# Patient Record
Sex: FEMALE | Race: WHITE | NOT HISPANIC OR LATINO | Employment: OTHER | URBAN - METROPOLITAN AREA
[De-identification: names, ages, dates, MRNs, and addresses within clinical notes are randomized per-mention and may not be internally consistent; named-entity substitution may affect disease eponyms.]

---

## 2017-01-03 ENCOUNTER — TRANSFERRED RECORDS (OUTPATIENT)
Dept: HEALTH INFORMATION MANAGEMENT | Facility: CLINIC | Age: 67
End: 2017-01-03

## 2017-01-18 ENCOUNTER — OFFICE VISIT (OUTPATIENT)
Dept: FAMILY MEDICINE | Facility: CLINIC | Age: 67
End: 2017-01-18
Payer: COMMERCIAL

## 2017-01-18 ENCOUNTER — TELEPHONE (OUTPATIENT)
Dept: FAMILY MEDICINE | Facility: CLINIC | Age: 67
End: 2017-01-18

## 2017-01-18 VITALS
HEIGHT: 65 IN | BODY MASS INDEX: 22.82 KG/M2 | DIASTOLIC BLOOD PRESSURE: 75 MMHG | WEIGHT: 137 LBS | RESPIRATION RATE: 20 BRPM | HEART RATE: 59 BPM | TEMPERATURE: 98.2 F | SYSTOLIC BLOOD PRESSURE: 105 MMHG

## 2017-01-18 DIAGNOSIS — J01.00 ACUTE NON-RECURRENT MAXILLARY SINUSITIS: Primary | ICD-10-CM

## 2017-01-18 DIAGNOSIS — B96.89 BACTERIAL CONJUNCTIVITIS OF BOTH EYES: ICD-10-CM

## 2017-01-18 DIAGNOSIS — H10.9 BACTERIAL CONJUNCTIVITIS OF BOTH EYES: ICD-10-CM

## 2017-01-18 PROCEDURE — 99214 OFFICE O/P EST MOD 30 MIN: CPT | Performed by: INTERNAL MEDICINE

## 2017-01-18 RX ORDER — DOXYCYCLINE 100 MG/1
100 CAPSULE ORAL 2 TIMES DAILY
Qty: 20 CAPSULE | Refills: 0 | Status: SHIPPED | OUTPATIENT
Start: 2017-01-18 | End: 2017-05-01

## 2017-01-18 RX ORDER — POLYMYXIN B SULFATE AND TRIMETHOPRIM 1; 10000 MG/ML; [USP'U]/ML
1 SOLUTION OPHTHALMIC 4 TIMES DAILY
Qty: 2 ML | Refills: 0 | Status: SHIPPED | OUTPATIENT
Start: 2017-01-18 | End: 2017-01-25

## 2017-01-18 NOTE — NURSING NOTE
"Chief Complaint   Patient presents with     Ear Problem     plugged ears for 2 weeks about     Eye Problem     swollen for about 2 weeks and mattery     Oral Swelling     lip swelling also       Initial /75 mmHg  Pulse 59  Temp(Src) 98.2  F (36.8  C) (Tympanic)  Resp 20  Ht 5' 5.25\" (1.657 m)  Wt 137 lb (62.143 kg)  BMI 22.63 kg/m2 Estimated body mass index is 22.63 kg/(m^2) as calculated from the following:    Height as of this encounter: 5' 5.25\" (1.657 m).    Weight as of this encounter: 137 lb (62.143 kg).  BP completed using cuff size: large  "

## 2017-01-18 NOTE — PATIENT INSTRUCTIONS
Thank you for choosing [facility].  You may be receiving a survey in the mail from Bianca Kohli regarding your visit today.  Please take a few minutes to complete and return the survey to let us know how we are doing.      If you have questions or concerns, please contact us via DataPad or you can contact your care team at [phone].    Our Clinic hours are:  Monday 6:40 am  to 7:00 pm  Tuesday -Friday 6:40 am to 5:00 pm    The [place] outpatient lab hours are:  Monday - Friday 6:10 am to 4:45 pm  Saturdays 7:00 am to 11:00 am  Appointments are required, call [phone]    If you have clinical questions after hours or would like to schedule an appointment,  call the clinic at [phone].      [facility]      1. Take pill antibiotic twice daily until gone  2. Take drops 4 x day for 1 week  3. Come back if you are getting sicker    Conjunctivitis, Bacterial  You have an infection in the membranes covering the white part of the eye. This part of the eye is called the conjunctiva. The infection is called conjunctivitis. The most common symptoms of conjunctivitis include a thick, pus-like discharge from the eye, swollen eyelids, redness, eyelids sticking together upon awakening, and a gritty or scratchy feeling in the eye. Your infection was caused by bacteria. It may be treated with medicine. With treatment, the infection takes about 7 to 10 days to resolve.    Home care    Use prescribed antibiotic eye drops or ointment as directed to treat the infection.    Apply a warm compress (towel soaked in warm water) to the affected eye 3 to 4 times a day. Do this just before applying medicine to the eye.    Use a warm, wet cloth to wipe away crusting of the eyelids in the morning. This is caused by mucus drainage during the night. You may also use saline irrigating solution or artificial tears to rinse away mucus in the eye. Do not put a patch over the eye.    Wash your hands before and after touching  the infected eye. This is to prevent spreading the infection to the other eye, and to other people. Do not share your towels or washcloths with others.    You may use acetaminophen or ibuprofen to control pain, unless another medicine was prescribed. (Note: If you have chronic liver or kidney disease or have ever had a stomach ulcer or gastrointestinal bleeding, talk with your doctor before using these medicines.)    Do not wear contact lenses until your eyes have healed and all symptoms are gone.  Follow-up care  Follow up with your healthcare provider, or as advised.  When to seek medical advice  Call your healthcare provider right away if any of these occur:    Worsening vision    Increasing pain in the eye    Increasing swelling or redness of the eyelid    Redness spreading around the eye    3474-6042 [employer]. [address] [place] [address], [place] [address]. All rights reserved. This information is not intended as a substitute for professional medical care. Always follow your healthcare professional's instructions.        Sinusitis (Antibiotic Treatment)    The sinuses are air-filled spaces within the bones of the face. They connect to the inside of the nose. Sinusitis is an inflammation of the tissue lining the sinus cavity. Sinus inflammation can occur during a cold. It can also be due to allergies to pollens and other particles in the air. Sinusitis can cause symptoms of sinus congestion and fullness. A sinus infection causes fever, headache and facial pain. There is often green or yellow drainage from the nose or into the back of the throat (post-nasal drip). You have been given antibiotics to treat this condition.  Home care:    Take the full course of antibiotics as instructed. Do not stop taking them, even if you feel better.    Drink plenty of water, hot tea, and other liquids. This may help thin mucus. It also may promote sinus drainage.    Heat may help soothe painful areas of the face. Use  a towel soaked in hot water. Or,  the shower and direct the hot spray onto your face. Using a vaporizer along with a menthol rub at night may also help.     An expectorant containing guaifenesin may help thin the mucus and promote drainage from the sinuses.    Over-the-counter decongestants may be used unless a similar medicine was prescribed. Nasal sprays work the fastest. Use one that contains phenylephrine or oxymetazoline. First blow the nose gently. Then use the spray. Do not use these medicines more often than directed on the label or symptoms may get worse. You may also use tablets containing pseudoephedrine. Avoid products that combine ingredients, because side effects may be increased. Read labels. You can also ask the pharmacist for help. (NOTE: Persons with high blood pressure should not use decongestants. They can raise blood pressure.)    Over-the-counter antihistamines may help if allergies contributed to your sinusitis.      Do not use nasal rinses or irrigation during an acute sinus infection, unless told to by your health care provider. Rinsing may spread the infection to other sinuses.    Use acetaminophen or ibuprofen to control pain, unless another pain medicine was prescribed. (If you have chronic liver or kidney disease or ever had a stomach ulcer, talk with your doctor before using these medicines. Aspirin should never be used in anyone under 18 years of age who is ill with a fever. It may cause severe liver damage.)    Don't smoke. This can worsen symptoms.  Follow-up care  Follow up with your healthcare provider or our staff if you are not improving within the next week.  When to seek medical advice  Call your healthcare provider if any of these occur:    Facial pain or headache becoming more severe    Stiff neck    Unusual drowsiness or confusion    Swelling of the forehead or eyelids    Vision problems, including blurred or double vision    Fever of 100.4 F (38 C) or higher, or as  directed by your healthcare provider    Seizure    Breathing problems    Symptoms not resolving within 10 days    9850-3910 [employer]. [address] [place] [address], [place] [address]. All rights reserved. This information is not intended as a substitute for professional medical care. Always follow your healthcare professional's instructions.

## 2017-01-18 NOTE — MR AVS SNAPSHOT
After Visit Summary   1/18/2017    Nora Alarcon    MRN: [id number]           Patient Information     Date Of Birth          1950        Visit Information        Provider Department      1/18/2017 3:40 PM Brandie Hill, DO [facility]        Today's Diagnoses     Acute non-recurrent maxillary sinusitis    -  1     Bacterial conjunctivitis of both eyes           Care Instructions          Thank you for choosing [facility].  You may be receiving a survey in the mail from [facility] regarding your visit today.  Please take a few minutes to complete and return the survey to let us know how we are doing.      If you have questions or concerns, please contact us via Foodlve or you can contact your care team at [phone].    Our Clinic hours are:  Monday 6:40 am  to 7:00 pm  Tuesday -Friday 6:40 am to 5:00 pm    The [place] outpatient lab hours are:  Monday - Friday 6:10 am to 4:45 pm  Saturdays 7:00 am to 11:00 am  Appointments are required, call [phone]    If you have clinical questions after hours or would like to schedule an appointment,  call the clinic at [phone].      [facility]      1. Take pill antibiotic twice daily until gone  2. Take drops 4 x day for 1 week  3. Come back if you are getting sicker    Conjunctivitis, Bacterial  You have an infection in the membranes covering the white part of the eye. This part of the eye is called the conjunctiva. The infection is called conjunctivitis. The most common symptoms of conjunctivitis include a thick, pus-like discharge from the eye, swollen eyelids, redness, eyelids sticking together upon awakening, and a gritty or scratchy feeling in the eye. Your infection was caused by bacteria. It may be treated with medicine. With treatment, the infection takes about 7 to 10 days to resolve.    Home care    Use prescribed antibiotic eye drops or ointment as directed to treat the infection.    Apply a warm  compress (towel soaked in warm water) to the affected eye 3 to 4 times a day. Do this just before applying medicine to the eye.    Use a warm, wet cloth to wipe away crusting of the eyelids in the morning. This is caused by mucus drainage during the night. You may also use saline irrigating solution or artificial tears to rinse away mucus in the eye. Do not put a patch over the eye.    Wash your hands before and after touching the infected eye. This is to prevent spreading the infection to the other eye, and to other people. Do not share your towels or washcloths with others.    You may use acetaminophen or ibuprofen to control pain, unless another medicine was prescribed. (Note: If you have chronic liver or kidney disease or have ever had a stomach ulcer or gastrointestinal bleeding, talk with your doctor before using these medicines.)    Do not wear contact lenses until your eyes have healed and all symptoms are gone.  Follow-up care  Follow up with your healthcare provider, or as advised.  When to seek medical advice  Call your healthcare provider right away if any of these occur:    Worsening vision    Increasing pain in the eye    Increasing swelling or redness of the eyelid    Redness spreading around the eye    8249-6436 [employer]. [address] [place] [address]. All rights reserved. This information is not intended as a substitute for professional medical care. Always follow your healthcare professional's instructions.        Sinusitis (Antibiotic Treatment)    The sinuses are air-filled spaces within the bones of the face. They connect to the inside of the nose. Sinusitis is an inflammation of the tissue lining the sinus cavity. Sinus inflammation can occur during a cold. It can also be due to allergies to pollens and other particles in the air. Sinusitis can cause symptoms of sinus congestion and fullness. A sinus infection causes fever, headache and facial pain. There is often  green or yellow drainage from the nose or into the back of the throat (post-nasal drip). You have been given antibiotics to treat this condition.  Home care:    Take the full course of antibiotics as instructed. Do not stop taking them, even if you feel better.    Drink plenty of water, hot tea, and other liquids. This may help thin mucus. It also may promote sinus drainage.    Heat may help soothe painful areas of the face. Use a towel soaked in hot water. Or,  the shower and direct the hot spray onto your face. Using a vaporizer along with a menthol rub at night may also help.     An expectorant containing guaifenesin may help thin the mucus and promote drainage from the sinuses.    Over-the-counter decongestants may be used unless a similar medicine was prescribed. Nasal sprays work the fastest. Use one that contains phenylephrine or oxymetazoline. First blow the nose gently. Then use the spray. Do not use these medicines more often than directed on the label or symptoms may get worse. You may also use tablets containing pseudoephedrine. Avoid products that combine ingredients, because side effects may be increased. Read labels. You can also ask the pharmacist for help. (NOTE: Persons with high blood pressure should not use decongestants. They can raise blood pressure.)    Over-the-counter antihistamines may help if allergies contributed to your sinusitis.      Do not use nasal rinses or irrigation during an acute sinus infection, unless told to by your health care provider. Rinsing may spread the infection to other sinuses.    Use acetaminophen or ibuprofen to control pain, unless another pain medicine was prescribed. (If you have chronic liver or kidney disease or ever had a stomach ulcer, talk with your doctor before using these medicines. Aspirin should never be used in anyone under 18 years of age who is ill with a fever. It may cause severe liver damage.)    Don't smoke. This can worsen  symptoms.  Follow-up care  Follow up with your healthcare provider or our staff if you are not improving within the next week.  When to seek medical advice  Call your healthcare provider if any of these occur:    Facial pain or headache becoming more severe    Stiff neck    Unusual drowsiness or confusion    Swelling of the forehead or eyelids    Vision problems, including blurred or double vision    Fever of 100.4 F (38 C) or higher, or as directed by your healthcare provider    Seizure    Breathing problems    Symptoms not resolving within 10 days    [phone] [employer]. [address] [place] [address]. All rights reserved. This information is not intended as a substitute for professional medical care. Always follow your healthcare professional's instructions.              Follow-ups after your visit        Your next 10 appointments already scheduled     Apr 24, 2017 10:30 AM   LAB with LAB FIRST FLOOR [facility] ([facility])    [address] [place] [id number] [phone]           Patient must bring picture ID.  Patient should be prepared to give a urine specimen  Please do not eat 10-12 hours before your appointment if you are coming in fasting for labs on lipids, cholesterol, or glucose (sugar).  Pregnant women should follow their Care Team instructions. Water with medications is okay. Do not drink coffee or other fluids.   If you have concerns about taking  your medications, please ask at office or if scheduling via EverPowert, send a message by clicking on Secure Messaging, Message Your Care Team.            Apr 24, 2017 11:00 AM   Return Visit with Misha Molina MD   [facility] ([facility]    [address] [place] [id number] [phone]              Who to contact     If you have questions or need follow up information about today's clinic visit or your schedule  "please contact [facility] directly at [phone].  Normal or non-critical lab and imaging results will be communicated to you by MyChart, letter or phone within 4 business days after the clinic has received the results. If you do not hear from us within 7 days, please contact the clinic through MyChart or phone. If you have a critical or abnormal lab result, we will notify you by phone as soon as possible.  Submit refill requests through Bitspark or call your pharmacy and they will forward the refill request to us. Please allow 3 business days for your refill to be completed.          Additional Information About Your Visit        [facility] Information     Bitspark lets you send messages to your doctor, view your test results, renew your prescriptions, schedule appointments and more. To sign up, go to [email or web address] . Click on \"Log in\" on the left side of the screen, which will take you to the Welcome page. Then click on \"Sign up Now\" on the right side of the page.     You will be asked to enter the access code listed below, as well as some personal information. Please follow the directions to create your username and password.     Your access code is: [email or web address]  Expires: 2017 10:32 AM     Your access code will  in 90 days. If you need help or a new code, please call your [place] clinic or [phone].        Care EveryWhere ID     This is your Care EveryWhere ID. This could be used by other organizations to access your [place] medical records  [id number]        Your Vitals Were     Pulse Temperature Respirations Height BMI (Body Mass Index)       59 98.2  F (36.8  C) (Tympanic) 20 5' 5.25\" (1.657 m) 22.63 kg/m2        Blood Pressure from Last 3 Encounters:   17 105/75   10/25/16 131/57   16 95/50    Weight from Last 3 Encounters:   17 137 lb (62.143 kg)   10/25/16 130 lb (58.968 kg)   16 122 lb 11.2 oz (55.656 kg)              Today, you had the " following     No orders found for display         Today's Medication Changes          These changes are accurate as of: 1/18/17  4:23 PM.  If you have any questions, ask your nurse or doctor.               Start taking these medicines.        Dose/Directions    doxycycline 100 MG capsule   Commonly known as:  VIBRAMYCIN   Used for:  Acute non-recurrent maxillary sinusitis   Started by:  Brandie Hill DO        Dose:  100 mg   Take 1 capsule (100 mg) by mouth 2 times daily   Quantity:  20 capsule   Refills:  0       trimethoprim-polymyxin b ophthalmic solution   Commonly known as:  POLYTRIM   Used for:  Bacterial conjunctivitis of both eyes   Started by:  Brandie Hill DO        Dose:  1 drop   Place 1 drop into both eyes 4 times daily for 7 days   Quantity:  2 mL   Refills:  0            Where to get your medicines      These medications were sent to [facility] [address] [place] [address]  [address] [place] [id number]     Phone:  [phone]    - doxycycline 100 MG capsule  - trimethoprim-polymyxin b ophthalmic solution             Primary Care Provider Office Phone # Fax #    Brandie Hill [phone]       [facility] [address] [place] [address]        Thank you!     Thank you for choosing [facility]  for your care. Our goal is always to provide you with excellent care. Hearing back from our patients is one way we can continue to improve our services. Please take a few minutes to complete the written survey that you may receive in the mail after your visit with us. Thank you!             Your Updated Medication List - Protect others around you: Learn how to safely use, store and throw away your medicines at www.disposemymeds.org.          This list is accurate as of: 1/18/17  4:23 PM.  Always use your most recent med list.                   Brand Name Dispense Instructions for use    albuterol (2.5 MG/3ML)  0.083% neb solution      Take 1 vial by nebulization 4 times daily       ASPIRIN PO      325 mg by Per G Tube route daily       chlorhexidine 0.12 % solution    PERIDEX     SWISH AND SPIT TWICE DAILY AS DIRECTED       DEPAKOTE PO      Take 500 mg by mouth 2 times daily       doxycycline 100 MG capsule    VIBRAMYCIN    20 capsule    Take 1 capsule (100 mg) by mouth 2 times daily       Metoprolol Tartrate 37.5 MG Tabs     180 tablet    Take 37.5 mg by mouth 2 times daily       omeprazole 20 MG tablet     30 tablet    Take 1 tablet (20 mg) by mouth daily as needed Take 30-60 minutes before a meal.       * order for DME     1 Package    Equipment being ordered: compression stockings.       * order for DME     2 each    Equipment being ordered: bilateral below the knee LILIBETH stockings       REMERON PO      37.5 mg by Gastric Tube route At Bedtime       SEROQUEL PO      Take 12.5 mg by mouth 3 times daily Taking twice a day       simvastatin 10 MG tablet    ZOCOR    90 tablet    Take 1 tablet (10 mg) by mouth At Bedtime       trimethoprim-polymyxin b ophthalmic solution    POLYTRIM    2 mL    Place 1 drop into both eyes 4 times daily for 7 days       TYLENOL PO      650 mg by Per G Tube route every 4 hours as needed for mild pain or fever       * Notice:  This list has 2 medication(s) that are the same as other medications prescribed for you. Read the directions carefully, and ask your doctor or other care provider to review them with you.

## 2017-01-18 NOTE — TELEPHONE ENCOUNTER
Reason for call:  Patient reporting a symptom    Symptom or request: plugged ears and swollen eyes    Duration (how long have symptoms been present): unknown    Have you been treated for this before? No    Additional comments: call from afr stating pt is calling with plugged ears and swollen eyes. She scheduled her with an appt but was wondering if it was ok for a simple spot or if she should be triaged first.    Phone Number patient can be reached at:  Home number on file [phone] (home)    Best Time:  any    Can we leave a detailed message on this number:  YES    Call taken on 1/18/2017 at 10:13 AM by Susi Mota

## 2017-01-18 NOTE — TELEPHONE ENCOUNTER
Called patient to triage for appointment today.  She has had nasal congestion for a week and now having plugged ears and itchy, watery eyes.  Patient denies cough and sinus pain/pressure.  Patient plans to come in for appointment as already scheduled.    Paz Polanco RN
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
## 2017-01-30 ENCOUNTER — OFFICE VISIT (OUTPATIENT)
Dept: FAMILY MEDICINE | Facility: CLINIC | Age: 67
End: 2017-01-30
Payer: COMMERCIAL

## 2017-01-30 VITALS
OXYGEN SATURATION: 97 % | HEART RATE: 54 BPM | SYSTOLIC BLOOD PRESSURE: 110 MMHG | HEIGHT: 65 IN | TEMPERATURE: 97.7 F | WEIGHT: 128.2 LBS | DIASTOLIC BLOOD PRESSURE: 62 MMHG | BODY MASS INDEX: 21.36 KG/M2

## 2017-01-30 DIAGNOSIS — K59.09 OTHER CONSTIPATION: ICD-10-CM

## 2017-01-30 DIAGNOSIS — R30.0 DYSURIA: Primary | ICD-10-CM

## 2017-01-30 LAB
ALBUMIN UR-MCNC: NEGATIVE MG/DL
APPEARANCE UR: ABNORMAL
BACTERIA #/AREA URNS HPF: ABNORMAL /HPF
BILIRUB UR QL STRIP: NEGATIVE
COLOR UR AUTO: YELLOW
GLUCOSE UR STRIP-MCNC: NEGATIVE MG/DL
HGB UR QL STRIP: ABNORMAL
KETONES UR STRIP-MCNC: NEGATIVE MG/DL
LEUKOCYTE ESTERASE UR QL STRIP: NEGATIVE
NITRATE UR QL: NEGATIVE
NON-SQ EPI CELLS #/AREA URNS LPF: ABNORMAL /LPF
PH UR STRIP: 5.5 PH (ref 5–7)
RBC #/AREA URNS AUTO: ABNORMAL /HPF (ref 0–2)
SP GR UR STRIP: >1.03 (ref 1–1.03)
URATE CRY #/AREA URNS HPF: ABNORMAL /HPF
URN SPEC COLLECT METH UR: ABNORMAL
UROBILINOGEN UR STRIP-ACNC: 0.2 EU/DL (ref 0.2–1)
WBC #/AREA URNS AUTO: ABNORMAL /HPF (ref 0–2)

## 2017-01-30 PROCEDURE — 99213 OFFICE O/P EST LOW 20 MIN: CPT | Performed by: NURSE PRACTITIONER

## 2017-01-30 PROCEDURE — 81001 URINALYSIS AUTO W/SCOPE: CPT | Performed by: NURSE PRACTITIONER

## 2017-01-30 NOTE — NURSING NOTE
"Chief Complaint   Patient presents with     Dysuria       Initial There were no vitals taken for this visit. Estimated body mass index is 22.63 kg/(m^2) as calculated from the following:    Height as of 1/18/17: 5' 5.25\" (1.657 m).    Weight as of 1/18/17: 137 lb (62.143 kg).  BP completed using cuff size: regular  "

## 2017-01-30 NOTE — PROGRESS NOTES
"  SUBJECTIVE:                                                    Nora Alarcon is a 66 year old female who presents to clinic today for the following health issues:      URINARY TRACT SYMPTOMS     Onset: Saturday-2 - 3 days ago    Description:   Painful urination (Dysuria): YES  Blood in urine (Hematuria): YES  Delay in urine (Hesitency): no     Intensity: mild    Progression of Symptoms:  same    Accompanying Signs & Symptoms:  Fever/chills: no   Flank pain no   Nausea and vomiting: no   Any vaginal symptoms: none   Abdominal/Pelvic Pain: no    History:   History of frequent UTI's: NO  History of kidney stones: no   Sexually Active: no   Possibility of pregnancy: No    Precipitating factors:   none       Therapies Tried and outcome: none    Pt also states that she is constipated- was able to have a normal BM before her appointment today.       -------------------------------------    Problem list and histories reviewed & adjusted, as indicated.  Additional history: as documented    Problem list, Medication list, Allergies, and Medical/Social/Surgical histories reviewed in [facility] and updated as appropriate.    ROS:  Constitutional, HEENT, cardiovascular, pulmonary, GI, , musculoskeletal, neuro, skin, endocrine and psych systems are negative, except as otherwise noted.    OBJECTIVE:                                                    /62 mmHg  Pulse 54  Temp(Src) 97.7  F (36.5  C) (Tympanic)  Ht 5' 5.25\" (1.657 m)  Wt 128 lb 3.2 oz (58.151 kg)  BMI 21.18 kg/m2  SpO2 97%  Body mass index is 21.18 kg/(m^2).  GENERAL: alert and no distress- thin appearing   RESP: lungs clear to auscultation - no rales, rhonchi or wheezes  CV: regular rate and rhythm, normal S1 S2, no S3 or S4, no murmur, click or rub,   ABDOMEN: soft, nontender, no hepatosplenomegaly, no masses and bowel sounds normal  MS: no gross musculoskeletal defects noted, no edema    Diagnostic Test Results:  none      ASSESSMENT/PLAN:                "                                         1. Dysuria  UA did not show any indication of infection- patient left before results were back as she did not feel like waiting.  Recommend to increase fluid intake - try OTC cranberry tablets- if symptoms persist would repeat UA  - *UA reflex to Microscopic and Culture ([facility] and [facility] (except [place] and [place])  - Urine Microscopic    2. Other constipation  Recommend to patient drinking 60 oz water day  Increase fruit and fiber in diet  Ok to take Senakot prn  Take metamucil daily if           Brandie JESUS Escobar CNP  [facility]

## 2017-04-14 DIAGNOSIS — N18.30 CKD (CHRONIC KIDNEY DISEASE) STAGE 3, GFR 30-59 ML/MIN (H): Primary | ICD-10-CM

## 2017-05-01 ENCOUNTER — OFFICE VISIT (OUTPATIENT)
Dept: NEPHROLOGY | Facility: CLINIC | Age: 67
End: 2017-05-01
Payer: COMMERCIAL

## 2017-05-01 VITALS
DIASTOLIC BLOOD PRESSURE: 59 MMHG | SYSTOLIC BLOOD PRESSURE: 122 MMHG | HEART RATE: 60 BPM | WEIGHT: 133 LBS | BODY MASS INDEX: 21.96 KG/M2

## 2017-05-01 DIAGNOSIS — N18.30 CKD (CHRONIC KIDNEY DISEASE) STAGE 3, GFR 30-59 ML/MIN (H): ICD-10-CM

## 2017-05-01 DIAGNOSIS — I10 ESSENTIAL HYPERTENSION WITH GOAL BLOOD PRESSURE LESS THAN 140/90: ICD-10-CM

## 2017-05-01 DIAGNOSIS — R79.89 ELEVATED SERUM CREATININE: Primary | ICD-10-CM

## 2017-05-01 LAB
ALBUMIN SERPL-MCNC: 3.3 G/DL (ref 3.4–5)
ALBUMIN UR-MCNC: NEGATIVE MG/DL
ANION GAP SERPL CALCULATED.3IONS-SCNC: 6 MMOL/L (ref 3–14)
APPEARANCE UR: CLEAR
BACTERIA #/AREA URNS HPF: ABNORMAL /HPF
BILIRUB UR QL STRIP: NEGATIVE
BUN SERPL-MCNC: 21 MG/DL (ref 7–30)
CALCIUM SERPL-MCNC: 9.2 MG/DL (ref 8.5–10.1)
CHLORIDE SERPL-SCNC: 105 MMOL/L (ref 94–109)
CO2 SERPL-SCNC: 30 MMOL/L (ref 20–32)
COLOR UR AUTO: YELLOW
CREAT SERPL-MCNC: 0.84 MG/DL (ref 0.52–1.04)
CREAT UR-MCNC: 136 MG/DL
CREAT UR-MCNC: 136 MG/DL
GFR SERPL CREATININE-BSD FRML MDRD: 67 ML/MIN/1.7M2
GLUCOSE SERPL-MCNC: 78 MG/DL (ref 70–99)
GLUCOSE UR STRIP-MCNC: NEGATIVE MG/DL
GRAN CASTS #/AREA URNS LPF: ABNORMAL /LPF
HGB BLD-MCNC: 14.2 G/DL (ref 11.7–15.7)
HGB UR QL STRIP: ABNORMAL
HYALINE CASTS #/AREA URNS LPF: ABNORMAL /LPF (ref 0–2)
KETONES UR STRIP-MCNC: NEGATIVE MG/DL
LEUKOCYTE ESTERASE UR QL STRIP: NEGATIVE
MICROALBUMIN UR-MCNC: 17 MG/L
MICROALBUMIN/CREAT UR: 12.21 MG/G CR (ref 0–25)
NITRATE UR QL: NEGATIVE
NON-SQ EPI CELLS #/AREA URNS LPF: ABNORMAL /LPF
PH UR STRIP: 6 PH (ref 5–7)
PHOSPHATE SERPL-MCNC: 4.2 MG/DL (ref 2.5–4.5)
POTASSIUM SERPL-SCNC: 4.6 MMOL/L (ref 3.4–5.3)
PROT UR-MCNC: 0.11 G/L
PROT/CREAT 24H UR: 0.08 G/G CR (ref 0–0.2)
PTH-INTACT SERPL-MCNC: 39 PG/ML (ref 12–72)
RBC #/AREA URNS AUTO: ABNORMAL /HPF (ref 0–2)
SODIUM SERPL-SCNC: 141 MMOL/L (ref 133–144)
SP GR UR STRIP: 1.02 (ref 1–1.03)
URN SPEC COLLECT METH UR: ABNORMAL
UROBILINOGEN UR STRIP-MCNC: NORMAL MG/DL (ref 0–2)
WBC #/AREA URNS AUTO: ABNORMAL /HPF (ref 0–2)

## 2017-05-01 PROCEDURE — 83970 ASSAY OF PARATHORMONE: CPT | Performed by: INTERNAL MEDICINE

## 2017-05-01 PROCEDURE — 81001 URINALYSIS AUTO W/SCOPE: CPT | Performed by: INTERNAL MEDICINE

## 2017-05-01 PROCEDURE — 36415 COLL VENOUS BLD VENIPUNCTURE: CPT | Performed by: INTERNAL MEDICINE

## 2017-05-01 PROCEDURE — 85018 HEMOGLOBIN: CPT | Performed by: INTERNAL MEDICINE

## 2017-05-01 PROCEDURE — 84156 ASSAY OF PROTEIN URINE: CPT | Performed by: INTERNAL MEDICINE

## 2017-05-01 PROCEDURE — 99214 OFFICE O/P EST MOD 30 MIN: CPT | Performed by: INTERNAL MEDICINE

## 2017-05-01 PROCEDURE — 80069 RENAL FUNCTION PANEL: CPT | Performed by: INTERNAL MEDICINE

## 2017-05-01 PROCEDURE — 82043 UR ALBUMIN QUANTITATIVE: CPT | Performed by: INTERNAL MEDICINE

## 2017-05-01 NOTE — NURSING NOTE
"Nora Alarcon's goals for this visit include:   Chief Complaint   Patient presents with     RECHECK       She requests these members of her care team be copied on today's visit information: pcp    PCP: Brandie Hill    Referring Provider:  No referring provider defined for this encounter.    Chief Complaint   Patient presents with     RECHECK       Initial Wt 60.3 kg (133 lb)  BMI 21.96 kg/m2 Estimated body mass index is 21.96 kg/(m^2) as calculated from the following:    Height as of 1/30/17: 1.657 m (5' 5.25\").    Weight as of this encounter: 60.3 kg (133 lb).  Medication Reconciliation: complete    Do you need any medication refills at today's visit? No    Nadia Rayn CMA ([facility]AMA)      "

## 2017-05-01 NOTE — LETTER
May 2, 2017      Nora Alarcon  [address] [address] [address]              Dear Nora,      Your most recent lab results are attached.  There is no albumin in the urine which is reassuring.    Please call if you have any further questions.          Sincerely,      Misha Molina MD  KW/gw    Results for orders placed or performed in visit on 05/01/17   UA with Microscopic reflex to Culture   Result Value Ref Range    Color Urine Yellow     Appearance Urine Clear     Glucose Urine Negative NEG mg/dL    Bilirubin Urine Negative NEG    Ketones Urine Negative NEG mg/dL    Specific Gravity Urine 1.020 1.003 - 1.035    Blood Urine Trace (A) NEG    pH Urine 6.0 5.0 - 7.0 pH    Protein Albumin Urine Negative NEG mg/dL    Urobilinogen mg/dL Normal 0.0 - 2.0 mg/dL    Nitrite Urine Negative NEG    Leukocyte Esterase Urine Negative NEG    Source Midstream Urine     WBC Urine O - 2 0 - 2 /HPF    RBC Urine O - 2 0 - 2 /HPF    Bacteria Urine Few  Unconcentrated   (A) NEG /HPF    Squamous Epithelial /LPF Urine Few FEW /LPF    Hyaline Casts O - 2 0 - 2 /LPF    Granular Casts 0-2 (A) NEG /LPF   Albumin Random Urine Quantitative   Result Value Ref Range    Creatinine Urine 136 mg/dL    Albumin Urine mg/L 17 mg/L    Albumin Urine mg/g Cr 12.21 0 - 25 mg/g Cr

## 2017-05-01 NOTE — MR AVS SNAPSHOT
After Visit Summary   2017    Nora Alarcon    MRN: [id number]           Patient Information     Date Of Birth          1950        Visit Information        Provider Department      2017 11:00 AM Misha Molina MD [facility]        Today's Diagnoses     Elevated serum creatinine    -  1       Follow-ups after your visit        Who to contact     If you have questions or need follow up information about today's clinic visit or your schedule please contact [facility] directly at [phone].  Normal or non-critical lab and imaging results will be communicated to you by [employer]ure[employer], letter or phone within 4 business days after the clinic has received the results. If you do not hear from us within 7 days, please contact the clinic through [employer]ecure[employer] or phone. If you have a critical or abnormal lab result, we will notify you by phone as soon as possible.  Submit refill requests through [employer] or call your pharmacy and they will forward the refill request to us. Please allow 3 business days for your refill to be completed.          Additional Information About Your Visit        MyChart Information     [employer] is an electronic gateway that provides easy, online access to your medical records. With [employer], you can request a clinic appointment, read your test results, renew a prescription or communicate with your care team.     To sign up for [employer] visit the website at [email or web address]   You will be asked to enter the access code listed below, as well as some personal information. Please follow the directions to create your username and password.     Your access code is: [email or web address]  Expires: 2017 10:05 AM     Your access code will  in 90 days. If you need help or a new code, please contact your [facility] or call [phone] for assistance.        Care EveryWhere ID     This is your Care EveryWhere  ID. This could be used by other organizations to access your [facility] medical records  [id number]        Your Vitals Were     Pulse BMI (Body Mass Index)                60 21.96 kg/m2           Blood Pressure from Last 3 Encounters:   05/01/17 122/59   01/30/17 110/62   01/18/17 105/75    Weight from Last 3 Encounters:   05/01/17 60.3 kg (133 lb)   01/30/17 58.2 kg (128 lb 3.2 oz)   01/18/17 62.1 kg (137 lb)              We Performed the Following     Albumin Random Urine Quantitative     UA with Microscopic reflex to Culture          Today's Medication Changes          These changes are accurate as of: 5/1/17 11:30 AM.  If you have any questions, ask your nurse or doctor.               Stop taking these medicines if you haven't already. Please contact your care team if you have questions.     albuterol (2.5 MG/3ML) 0.083% neb solution   Stopped by:  Misha Molina MD           doxycycline 100 MG capsule   Commonly known as:  VIBRAMYCIN   Stopped by:  Misha Molina MD                    Primary Care Provider Office Phone # Fax #    Brandie Hill,  [phone]       [place] [id number]        Thank you!     Thank you for choosing [facility]  for your care. Our goal is always to provide you with excellent care. Hearing back from our patients is one way we can continue to improve our services. Please take a few minutes to complete the written survey that you may receive in the mail after your visit with us. Thank you!             Your Updated Medication List - Protect others around you: Learn how to safely use, store and throw away your medicines at www.disposemymeds.org.          This list is accurate as of: 5/1/17 11:30 AM.  Always use your most recent med list.                   Brand Name Dispense Instructions for use    ASPIRIN PO      325 mg by Per G Tube route daily       chlorhexidine 0.12 % solution    PERIDEX      SWISH AND SPIT TWICE DAILY AS DIRECTED       DEPAKOTE PO      Take 500 mg by mouth 2 times daily       Metoprolol Tartrate 37.5 MG Tabs     180 tablet    Take 37.5 mg by mouth 2 times daily       omeprazole 20 MG tablet     30 tablet    Take 1 tablet (20 mg) by mouth daily as needed Take 30-60 minutes before a meal.       * order for DME     1 Package    Equipment being ordered: compression stockings.       * order for DME     2 each    Equipment being ordered: bilateral below the knee LILIBETH stockings       REMERON PO      37.5 mg by Gastric Tube route At Bedtime       SEROQUEL PO      Take 12.5 mg by mouth 3 times daily Taking twice a day       simvastatin 10 MG tablet    ZOCOR    90 tablet    Take 1 tablet (10 mg) by mouth At Bedtime       TYLENOL PO      650 mg by Per G Tube route every 4 hours as needed for mild pain or fever       * Notice:  This list has 2 medication(s) that are the same as other medications prescribed for you. Read the directions carefully, and ask your doctor or other care provider to review them with you.

## 2017-05-31 DIAGNOSIS — E78.5 HYPERLIPIDEMIA LDL GOAL <130: ICD-10-CM

## 2017-05-31 NOTE — TELEPHONE ENCOUNTER
simvastatin (ZOCOR) 10 MG tablet     Last Written Prescription Date: 7/13/16  Last Fill Quantity: 90, # refills: 3  Last Office Visit with G, P or [facility] prescribing provider: 1/30/17       Lab Results   Component Value Date    CHOL 143 06/13/2015     Lab Results   Component Value/ Date    HDL 63 06/13/2015     Lab Results   Component Value Date    LDL 64 06/13/2015     Lab Results   Component Value Date    TRIG 78 06/13/2015     Lab Results   Component Value Date    CHOLHDLRATIO 2.3 06/13/2015
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
## 2017-06-02 ENCOUNTER — TELEPHONE (OUTPATIENT)
Dept: FAMILY MEDICINE | Facility: CLINIC | Age: 67
End: 2017-06-02

## 2017-06-02 DIAGNOSIS — H90.8 MIXED HEARING LOSS: Primary | ICD-10-CM

## 2017-06-02 NOTE — TELEPHONE ENCOUNTER
Patient having problems with hearing   Patient requesting referral to  Audiology  And ENT     Appointment  06/06/17  Please call and advise  Jeimy LOMBARDI

## 2017-06-02 NOTE — TELEPHONE ENCOUNTER
LOV 1/18/17  Patient reports:  Unable to hear the tv, sitting in living room cannot hear tea kettle or baking timer go off  Patient would like referral for Audiology, appt already scheduled 6/6/17 by patient  Please advise  Referral pended    Routing to provider.    Sofia BARKLEY Rn

## 2017-06-02 NOTE — TELEPHONE ENCOUNTER
Message left for the pt to return a call to the clinic. CSS can let pt know that referral has been placed.    Maribel Harper RN

## 2017-06-05 RX ORDER — SIMVASTATIN 10 MG
10 TABLET ORAL AT BEDTIME
Qty: 30 TABLET | Refills: 0 | Status: SHIPPED | OUTPATIENT
Start: 2017-06-05 | End: 2017-06-27

## 2017-06-06 ENCOUNTER — OFFICE VISIT (OUTPATIENT)
Dept: AUDIOLOGY | Facility: CLINIC | Age: 67
End: 2017-06-06
Payer: COMMERCIAL

## 2017-06-06 ENCOUNTER — HOSPITAL ENCOUNTER (OUTPATIENT)
Dept: MAMMOGRAPHY | Facility: CLINIC | Age: 67
Discharge: HOME | End: 2017-06-06
Attending: INTERNAL MEDICINE | Admitting: INTERNAL MEDICINE
Payer: MEDICARE

## 2017-06-06 DIAGNOSIS — Z12.31 ENCOUNTER FOR SCREENING MAMMOGRAM FOR BREAST CANCER: ICD-10-CM

## 2017-06-06 DIAGNOSIS — H90.3 SENSORINEURAL HEARING LOSS, BILATERAL: Primary | ICD-10-CM

## 2017-06-06 PROCEDURE — G0202 SCR MAMMO BI INCL CAD: HCPCS

## 2017-06-06 PROCEDURE — 92557 COMPREHENSIVE HEARING TEST: CPT | Performed by: AUDIOLOGIST

## 2017-06-06 PROCEDURE — 92567 TYMPANOMETRY: CPT | Performed by: AUDIOLOGIST

## 2017-06-06 NOTE — MR AVS SNAPSHOT
After Visit Summary   6/6/2017    Nora Alarcon    MRN: [id number]           Patient Information     Date Of Birth          1950        Visit Information        Provider Department      6/6/2017 10:30 AM Kahte Sneed AuD [facility]        Today's Diagnoses     Sensorineural hearing loss, bilateral    -  1       Follow-ups after your visit        Your next 10 appointments already scheduled     Jun 06, 2017 11:45 AM CDT   MA SCREENING DIGITAL BILATERAL with [id number]   [facility] ([facility])    [address] [address]   [phone]           Do not use any powder, lotion or deodorant under your arms or on your breast. If you do, we will ask you to remove it before your exam.  Wear comfortable, two-piece clothing.  If you have any allergies, tell your care team.  Bring any previous mammograms from other facilities or have them mailed to the breast center.              Future tests that were ordered for you today     Open Future Orders        Priority Expected Expires Ordered    MA Screen Bilateral w/Blaine Routine  9/20/2017 9/20/2016            Who to contact     If you have questions or need follow up information about today's clinic visit or your schedule please contact [facility] directly at [phone].  Normal or non-critical lab and imaging results will be communicated to you by MyChart, letter or phone within 4 business days after the clinic has received the results. If you do not hear from us within 7 days, please contact the clinic through Anne Fogartyhart or phone. If you have a critical or abnormal lab result, we will notify you by phone as soon as possible.  Submit refill requests through Looxii or call your pharmacy and they will forward the refill request to us. Please allow 3 business days for your refill to be completed.          Additional Information About Your Visit        Anne FogartyharBuzzMob Information     Looxii lets  "you send messages to your doctor, view your test results, renew your prescriptions, schedule appointments and more. To sign up, go to [email or web address] . Click on \"Log in\" on the left side of the screen, which will take you to the Welcome page. Then click on \"Sign up Now\" on the right side of the page.     You will be asked to enter the access code listed below, as well as some personal information. Please follow the directions to create your username and password.     Your access code is: [email or web address]  Expires: 2017 10:05 AM     Your access code will  in 90 days. If you need help or a new code, please call your [place] clinic or [phone].        Care EveryWhere ID     This is your Care EveryWhere ID. This could be used by other organizations to access your [facility] medical records  [id number]         Blood Pressure from Last 3 Encounters:   17 122/59   17 110/62   17 105/75    Weight from Last 3 Encounters:   17 133 lb (60.3 kg)   17 128 lb 3.2 oz (58.2 kg)   17 137 lb (62.1 kg)              We Performed the Following     AUDIOGRAM/TYMPANOGRAM - INTERFACE     COMPREHENSIVE HEARING TEST     TYMPANOMETRY        Primary Care Provider Office Phone # Fax #    Brandie Hill  [phone]       [facility] [address] [address]        Thank you!     Thank you for choosing [facility]  for your care. Our goal is always to provide you with excellent care. Hearing back from our patients is one way we can continue to improve our services. Please take a few minutes to complete the written survey that you may receive in the mail after your visit with us. Thank you!             Your Updated Medication List - Protect others around you: Learn how to safely use, store and throw away your medicines at www.disposemymeds.org.          This list is accurate as of: 17 11:15 AM.  Always use your most recent med " list.                   Brand Name Dispense Instructions for use    ASPIRIN PO      325 mg by Per G Tube route daily       chlorhexidine 0.12 % solution    PERIDEX     SWISH AND SPIT TWICE DAILY AS DIRECTED       DEPAKOTE PO      Take 500 mg by mouth 2 times daily       Metoprolol Tartrate 37.5 MG Tabs     180 tablet    Take 37.5 mg by mouth 2 times daily       omeprazole 20 MG tablet     30 tablet    Take 1 tablet (20 mg) by mouth daily as needed Take 30-60 minutes before a meal.       * order for DME     1 Package    Equipment being ordered: compression stockings.       * order for DME     2 each    Equipment being ordered: bilateral below the knee LILIBETH stockings       REMERON PO      37.5 mg by Gastric Tube route At Bedtime       SEROQUEL PO      Take 12.5 mg by mouth 3 times daily Taking twice a day       simvastatin 10 MG tablet    ZOCOR    30 tablet    Take 1 tablet (10 mg) by mouth At Bedtime (Needs fasting lab work)       TYLENOL PO      650 mg by Per G Tube route every 4 hours as needed for mild pain or fever       * Notice:  This list has 2 medication(s) that are the same as other medications prescribed for you. Read the directions carefully, and ask your doctor or other care provider to review them with you.
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
## 2017-06-27 ENCOUNTER — OFFICE VISIT (OUTPATIENT)
Dept: FAMILY MEDICINE | Facility: CLINIC | Age: 67
End: 2017-06-27
Payer: COMMERCIAL

## 2017-06-27 VITALS
DIASTOLIC BLOOD PRESSURE: 70 MMHG | OXYGEN SATURATION: 99 % | SYSTOLIC BLOOD PRESSURE: 121 MMHG | HEIGHT: 65 IN | HEART RATE: 59 BPM | TEMPERATURE: 98.3 F | BODY MASS INDEX: 22.99 KG/M2 | WEIGHT: 138 LBS

## 2017-06-27 DIAGNOSIS — E78.5 HYPERLIPIDEMIA LDL GOAL <130: Primary | ICD-10-CM

## 2017-06-27 DIAGNOSIS — F31.0 BIPOLAR AFFECTIVE DISORDER, CURRENT EPISODE HYPOMANIC (H): ICD-10-CM

## 2017-06-27 DIAGNOSIS — I10 ESSENTIAL HYPERTENSION WITH GOAL BLOOD PRESSURE LESS THAN 140/90: ICD-10-CM

## 2017-06-27 DIAGNOSIS — N95.2 POSTMENOPAUSAL ATROPHIC VAGINITIS: ICD-10-CM

## 2017-06-27 PROCEDURE — 99214 OFFICE O/P EST MOD 30 MIN: CPT | Performed by: INTERNAL MEDICINE

## 2017-06-27 RX ORDER — ESTRADIOL 0.1 MG/G
1 CREAM VAGINAL
Qty: 42.5 G | Refills: 11 | Status: SHIPPED | OUTPATIENT
Start: 2017-06-29 | End: 2017-08-25

## 2017-06-27 RX ORDER — SIMVASTATIN 10 MG
10 TABLET ORAL AT BEDTIME
Qty: 90 TABLET | Refills: 3 | Status: SHIPPED | OUTPATIENT
Start: 2017-06-27 | End: 2018-06-25

## 2017-06-27 NOTE — NURSING NOTE
"Initial /70  Pulse 59  Temp 98.3  F (36.8  C) (Tympanic)  Ht 5' 5.25\" (1.657 m)  Wt 138 lb (62.6 kg)  SpO2 99%  Breastfeeding? No  BMI 22.79 kg/m2 Estimated body mass index is 22.79 kg/(m^2) as calculated from the following:    Height as of this encounter: 5' 5.25\" (1.657 m).    Weight as of this encounter: 138 lb (62.6 kg). .    Esperanza Richardson CMA ([facility])  "

## 2017-06-27 NOTE — PROGRESS NOTES
"  SUBJECTIVE:                                                    Nora Alarcon is a 66 year old female who presents to clinic today for the following health issues:    Chief Complaint   Patient presents with     Recheck Medication     her nephrologist told her to go off omeprazole, she wants to discuss this with you today.      Medication Problem     she does not even know why she is taking mirtazapine, she would like to know what this medication is for.      Mouth Problem     she states her mouth is dry all the time and she is using biotin spray but not sure that is working.      She saw Nephrology 5/17 for CKD.  In the note, there were no recommendations to stop PPI.  She reports she doesn't need to follow with Nephrology any longer.  She denies problems with GERD.  Father has history of stomach cancer.  She has not taken the PPI in a months, although it is on her med list.    Hypertension:  She reports she is on losartan, but I don't see this on her med list.  We had stopped this previously, but she reports pharmacy keeps trying to fill, we discussed staying off this med.  Is checking blood pressure at home, all < 120.  Still taking metoprolol.    Psych:  She takes seroquel under direction of Dr. Tipton. Feels mood is stable. She is also on mirtazapine, which may have been started for appetite.  She is currently taking \"3 pills at bedtime' but doesn't know the dose.    Painful intercourse:  She reports she was on a medication in the past (cream) that helped.  It was expensive in the past.      Current Outpatient Prescriptions   Medication Sig Dispense Refill     simvastatin (ZOCOR) 10 MG tablet Take 1 tablet (10 mg) by mouth At Bedtime (Needs fasting lab work) 30 tablet 0     chlorhexidine (PERIDEX) 0.12 % solution SWISH AND SPIT TWICE DAILY AS DIRECTED  99     Divalproex Sodium (DEPAKOTE PO) Take 500 mg by mouth 2 times daily       Metoprolol Tartrate 37.5 MG TABS Take 37.5 mg by mouth 2 times daily 180 " "tablet 3     QUEtiapine Fumarate (SEROQUEL PO) Take 12.5 mg by mouth 3 times daily Taking twice a day       Acetaminophen (TYLENOL PO) 650 mg by Per G Tube route every 4 hours as needed for mild pain or fever       ASPIRIN  mg by Per G Tube route daily        Mirtazapine (REMERON PO) 37.5 mg by Gastric Tube route At Bedtime       omeprazole 20 MG tablet Take 1 tablet (20 mg) by mouth daily as needed Take 30-60 minutes before a meal. 30 tablet 10     order for DME Equipment being ordered: bilateral below the knee LILIBETH stockings 2 each 2     order for DME Equipment being ordered: compression stockings. 1 Package 1       Reviewed and updated as needed this visit by clinical staff  Tobacco  Allergies  Meds  Problems  Med Hx  Surg Hx  Fam Hx  Soc Hx        Reviewed and updated as needed this visit by Provider  Allergies  Meds  Problems         ROS:  Constitutional, HEENT, cardiovascular, pulmonary, gi and gu systems are negative, except as otherwise noted.    OBJECTIVE:     /70  Pulse 59  Temp 98.3  F (36.8  C) (Tympanic)  Ht 5' 5.25\" (1.657 m)  Wt 138 lb (62.6 kg)  SpO2 99%  Breastfeeding? No  BMI 22.79 kg/m2  Body mass index is 22.79 kg/(m^2).  GENERAL APPEARANCE: healthy, alert and no distress  PSYCH: mentation appears normal, anxious and worried      ASSESSMENT/PLAN:         ICD-10-CM    1. Hyperlipidemia LDL goal <130 E78.5 **Lipid panel reflex to direct LDL FUTURE anytime     simvastatin (ZOCOR) 10 MG tablet   2. Essential hypertension with goal blood pressure less than 140/90 I10    3. Postmenopausal atrophic vaginitis N95.2 estradiol (ESTRACE VAGINAL) 0.1 MG/GM cream   4. Bipolar affective disorder, current episode hypomanic (H) F31.0        1. Ok to slowly stop the mirtazapine.  Take 2 pills at bedtime for 4 days, then 1 pill at bedtime for 4 days, then stop.  This can help appetite but also mood.  If you find your mood worsening, let Dr. Tipton know.  2. Ok to stop and stay off " the omeprazole.  3. Ok to stop the Metoprolol.  Monitor the blood pressure.  If you note the blood pressure greater than 140, let Dr. Hill know.  4. Come back for fasting blood work.  5. Ok to restart Estradiol cream for vaginal symptoms       Brandie Hill, DO  [facility]

## 2017-06-27 NOTE — PATIENT INSTRUCTIONS
1. Ok to slowly stop the mirtazapine.  Take 2 pills at bedtime for 4 days, then 1 pill at bedtime for 4 days, then stop.  This can help appetite but also mood.  If you find your mood worsening, let Dr. Tipton know.  2. Ok to stop and stay off the omeprazole.  3. Ok to stop the Metoprolol.  Monitor the blood pressure.  If you note the blood pressure greater than 140, let Dr. Hill know.  4. Come back for fasting blood work.  5. Ok to restart Estradiol cream for vaginal symptoms

## 2017-06-27 NOTE — MR AVS SNAPSHOT
After Visit Summary   6/27/2017    Nora Alarcon    MRN: [id number]           Patient Information     Date Of Birth          1950        Visit Information        Provider Department      6/27/2017 6:40 AM Brandie Hill, DO [facility]        Today's Diagnoses     Hyperlipidemia LDL goal <130    -  1    Essential hypertension with goal blood pressure less than 140/90        Postmenopausal atrophic vaginitis          Care Instructions    1. Ok to slowly stop the mirtazapine.  Take 2 pills at bedtime for 4 days, then 1 pill at bedtime for 4 days, then stop.  This can help appetite but also mood.  If you find your mood worsening, let Dr. Tipton know.  2. Ok to stop and stay off the omeprazole.  3. Ok to stop the Metoprolol.  Monitor the blood pressure.  If you note the blood pressure greater than 140, let Dr. Hill know.  4. Come back for fasting blood work.  5. Ok to restart Estradiol cream for vaginal symptoms           Follow-ups after your visit        Future tests that were ordered for you today     Open Future Orders        Priority Expected Expires Ordered    **Lipid panel reflex to direct LDL FUTURE anytime Routine 6/27/2017 6/27/2018 6/27/2017            Who to contact     If you have questions or need follow up information about today's clinic visit or your schedule please contact [facility] directly at [phone].  Normal or non-critical lab and imaging results will be communicated to you by MyChart, letter or phone within 4 business days after the clinic has received the results. If you do not hear from us within 7 days, please contact the clinic through MyChart or phone. If you have a critical or abnormal lab result, we will notify you by phone as soon as possible.  Submit refill requests through Zulu or call your pharmacy and they will forward the refill request to us. Please allow 3 business days for your refill to be completed.           "Additional Information About Your Visit        MyChart Information     Herren[employer]e lets you send messages to your doctor, view your test results, renew your prescriptions, schedule appointments and more. To sign up, go to [email or web address] . Click on \"Log in\" on the left side of the screen, which will take you to the Welcome page. Then click on \"Sign up Now\" on the right side of the page.     You will be asked to enter the access code listed below, as well as some personal information. Please follow the directions to create your username and password.     Your access code is: [email or web address]  Expires: 2017 10:05 AM     Your access code will  in 90 days. If you need help or a new code, please call your [place] clinic or [phone].        Care EveryWhere ID     This is your Care EveryWhere ID. This could be used by other organizations to access your [place] medical records  [id number]        Your Vitals Were     Pulse Temperature Height Pulse Oximetry Breastfeeding? BMI (Body Mass Index)    59 98.3  F (36.8  C) (Tympanic) 5' 5.25\" (1.657 m) 99% No 22.79 kg/m2       Blood Pressure from Last 3 Encounters:   17 121/70   17 122/59   17 110/62    Weight from Last 3 Encounters:   17 138 lb (62.6 kg)   17 133 lb (60.3 kg)   17 128 lb 3.2 oz (58.2 kg)                 Today's Medication Changes          These changes are accurate as of: 17  7:13 AM.  If you have any questions, ask your nurse or doctor.               Start taking these medicines.        Dose/Directions    estradiol 0.1 MG/GM cream   Commonly known as:  ESTRACE VAGINAL   Used for:  Postmenopausal atrophic vaginitis   Started by:  Brandie Hill DO        Dose:  1 g   Start taking on:  2017   Place 1 g vaginally twice a week   Quantity:  42.5 g   Refills:  11         These medicines have changed or have updated prescriptions.        Dose/Directions    simvastatin 10 MG tablet   Commonly known " as:  ZOCOR   This may have changed:  additional instructions   Used for:  Hyperlipidemia LDL goal <130   Changed by:  Brandie Hill DO        Dose:  10 mg   Take 1 tablet (10 mg) by mouth At Bedtime   Quantity:  90 tablet   Refills:  3            Where to get your medicines      These medications were sent to [id number] - [place], [place] - [address]  [address], [facility] [address]     Phone:  [phone]     estradiol 0.1 MG/GM cream    simvastatin 10 MG tablet                Primary Care Provider Office Phone # Fax #    Brandie Leonela Hill [phone]       [facility] [address] [address]        Equal Access to Services     BONNIE BUTLER : Hadii paxton myrick hadasho Soomaali, waaxda luqadaha, qaybta kaalmada adeegyada, waxay jerryin jose chaudhary. So [facility] [phone].    ATENCIÓN: Si habla español, tiene a galaviz disposición servicios gratuitos de asistencia lingüística. [facility] [phone].    We comply with applicable federal civil rights laws and [place] laws. We do not discriminate on the basis of race, color, national origin, age, disability sex, sexual orientation or gender identity.            Thank you!     Thank you for choosing [facility]  for your care. Our goal is always to provide you with excellent care. Hearing back from our patients is one way we can continue to improve our services. Please take a few minutes to complete the written survey that you may receive in the mail after your visit with us. Thank you!             Your Updated Medication List - Protect others around you: Learn how to safely use, store and throw away your medicines at www.disposemymeds.org.          This list is accurate as of: 6/27/17  7:13 AM.  Always use your most recent med list.                   Brand Name Dispense Instructions for use Diagnosis    ASPIRIN PO      Take 325 mg by mouth daily         chlorhexidine 0.12 % solution    PERIDEX     SWISH AND SPIT TWICE DAILY AS DIRECTED        DEPAKOTE PO      Take 500 mg by mouth 2 times daily        estradiol 0.1 MG/GM cream   Start taking on:  6/29/2017    ESTRACE VAGINAL    42.5 g    Place 1 g vaginally twice a week    Postmenopausal atrophic vaginitis       * order for DME     1 Package    Equipment being ordered: compression stockings.    Edema       * order for DME     2 each    Equipment being ordered: bilateral below the knee LILIBETH stockings    Bilateral leg edema       SEROQUEL PO      Take 12.5 mg by mouth 3 times daily Taking twice a day        simvastatin 10 MG tablet    ZOCOR    90 tablet    Take 1 tablet (10 mg) by mouth At Bedtime    Hyperlipidemia LDL goal <130       TYLENOL PO      Take 650 mg by mouth every 4 hours as needed for mild pain or fever        * Notice:  This list has 2 medication(s) that are the same as other medications prescribed for you. Read the directions carefully, and ask your doctor or other care provider to review them with you.

## 2017-06-30 NOTE — TELEPHONE ENCOUNTER
Metoprolol discontinued 06/27/17    Losartan      Last Written Prescription Date: 07/22/16   Ended:09/23/16  Last Fill Quantity: 90, # refills: 3  Last Office Visit with G, [facility] or [facility] prescribing provider: 06/27/17       Potassium   Date Value Ref Range Status   05/01/2017 4.6 3.4 - 5.3 mmol/L Final     Creatinine   Date Value Ref Range Status   05/01/2017 0.84 0.52 - 1.04 mg/dL Final     BP Readings from Last 3 Encounters:   06/27/17 121/70   05/01/17 122/59   01/30/17 110/62

## 2017-07-10 RX ORDER — METOPROLOL TARTRATE 25 MG/1
TABLET, FILM COATED ORAL
Qty: 270 TABLET | OUTPATIENT
Start: 2017-07-10

## 2017-07-10 RX ORDER — LOSARTAN POTASSIUM 50 MG/1
TABLET ORAL
Qty: 90 TABLET | OUTPATIENT
Start: 2017-07-10

## 2017-08-25 ENCOUNTER — TELEPHONE (OUTPATIENT)
Dept: FAMILY MEDICINE | Facility: CLINIC | Age: 67
End: 2017-08-25

## 2017-08-25 ENCOUNTER — HOSPITAL ENCOUNTER (EMERGENCY)
Facility: CLINIC | Age: 67
Discharge: NURSING, REHAB OR LONG-TERM CARE | End: 2017-08-25
Attending: EMERGENCY MEDICINE | Admitting: EMERGENCY MEDICINE
Payer: MEDICARE

## 2017-08-25 VITALS
TEMPERATURE: 98.6 F | BODY MASS INDEX: 22.5 KG/M2 | WEIGHT: 140 LBS | OXYGEN SATURATION: 100 % | DIASTOLIC BLOOD PRESSURE: 58 MMHG | RESPIRATION RATE: 14 BRPM | HEIGHT: 66 IN | SYSTOLIC BLOOD PRESSURE: 138 MMHG | HEART RATE: 68 BPM

## 2017-08-25 DIAGNOSIS — R42 LIGHTHEADED: ICD-10-CM

## 2017-08-25 LAB
ALBUMIN UR-MCNC: NEGATIVE MG/DL
ANION GAP SERPL CALCULATED.3IONS-SCNC: 6 MMOL/L (ref 3–14)
APPEARANCE UR: CLEAR
BASOPHILS # BLD AUTO: 0 10E9/L (ref 0–0.2)
BASOPHILS NFR BLD AUTO: 0.5 %
BILIRUB UR QL STRIP: NEGATIVE
BUN SERPL-MCNC: 18 MG/DL (ref 7–30)
CALCIUM SERPL-MCNC: 8.3 MG/DL (ref 8.5–10.1)
CHLORIDE SERPL-SCNC: 107 MMOL/L (ref 94–109)
CO2 SERPL-SCNC: 27 MMOL/L (ref 20–32)
COLOR UR AUTO: YELLOW
CREAT SERPL-MCNC: 0.76 MG/DL (ref 0.52–1.04)
DIFFERENTIAL METHOD BLD: ABNORMAL
EOSINOPHIL # BLD AUTO: 0.1 10E9/L (ref 0–0.7)
EOSINOPHIL NFR BLD AUTO: 2.7 %
ERYTHROCYTE [DISTWIDTH] IN BLOOD BY AUTOMATED COUNT: 13.2 % (ref 10–15)
GFR SERPL CREATININE-BSD FRML MDRD: 76 ML/MIN/1.7M2
GLUCOSE SERPL-MCNC: 76 MG/DL (ref 70–99)
GLUCOSE UR STRIP-MCNC: NEGATIVE MG/DL
HCT VFR BLD AUTO: 37.8 % (ref 35–47)
HGB BLD-MCNC: 12.9 G/DL (ref 11.7–15.7)
HGB UR QL STRIP: NEGATIVE
IMM GRANULOCYTES # BLD: 0 10E9/L (ref 0–0.4)
IMM GRANULOCYTES NFR BLD: 0.3 %
KETONES UR STRIP-MCNC: NEGATIVE MG/DL
LEUKOCYTE ESTERASE UR QL STRIP: NEGATIVE
LYMPHOCYTES # BLD AUTO: 0.7 10E9/L (ref 0.8–5.3)
LYMPHOCYTES NFR BLD AUTO: 19.4 %
MCH RBC QN AUTO: 33.9 PG (ref 26.5–33)
MCHC RBC AUTO-ENTMCNC: 34.1 G/DL (ref 31.5–36.5)
MCV RBC AUTO: 99 FL (ref 78–100)
MONOCYTES # BLD AUTO: 0.7 10E9/L (ref 0–1.3)
MONOCYTES NFR BLD AUTO: 18 %
NEUTROPHILS # BLD AUTO: 2.2 10E9/L (ref 1.6–8.3)
NEUTROPHILS NFR BLD AUTO: 59.1 %
NITRATE UR QL: NEGATIVE
PH UR STRIP: 8 PH (ref 5–7)
PLATELET # BLD AUTO: 157 10E9/L (ref 150–450)
POTASSIUM SERPL-SCNC: 4.1 MMOL/L (ref 3.4–5.3)
RBC # BLD AUTO: 3.81 10E12/L (ref 3.8–5.2)
SODIUM SERPL-SCNC: 140 MMOL/L (ref 133–144)
SOURCE: ABNORMAL
SP GR UR STRIP: 1.01 (ref 1–1.03)
TROPONIN I SERPL-MCNC: 0.03 UG/L (ref 0–0.04)
UROBILINOGEN UR STRIP-MCNC: 2 MG/DL (ref 0–2)
WBC # BLD AUTO: 3.7 10E9/L (ref 4–11)

## 2017-08-25 PROCEDURE — 84484 ASSAY OF TROPONIN QUANT: CPT | Performed by: EMERGENCY MEDICINE

## 2017-08-25 PROCEDURE — 80048 BASIC METABOLIC PNL TOTAL CA: CPT | Performed by: EMERGENCY MEDICINE

## 2017-08-25 PROCEDURE — 81003 URINALYSIS AUTO W/O SCOPE: CPT | Performed by: EMERGENCY MEDICINE

## 2017-08-25 PROCEDURE — 99284 EMERGENCY DEPT VISIT MOD MDM: CPT | Mod: 25 | Performed by: EMERGENCY MEDICINE

## 2017-08-25 PROCEDURE — 99284 EMERGENCY DEPT VISIT MOD MDM: CPT | Performed by: EMERGENCY MEDICINE

## 2017-08-25 PROCEDURE — 93010 ELECTROCARDIOGRAM REPORT: CPT | Mod: Z6 | Performed by: EMERGENCY MEDICINE

## 2017-08-25 PROCEDURE — 85025 COMPLETE CBC W/AUTO DIFF WBC: CPT | Performed by: EMERGENCY MEDICINE

## 2017-08-25 PROCEDURE — 93005 ELECTROCARDIOGRAM TRACING: CPT | Performed by: EMERGENCY MEDICINE

## 2017-08-25 NOTE — ED AVS SNAPSHOT
[facility] Emergency Department    [address] [address]    Phone:  [phone]    Fax:  [phone]                                       Nora Alarcon   MRN: [id number]    Department:  [facility] Emergency Department   Date of Visit:  8/25/2017           Patient Information     Date Of Birth          1950        Your diagnoses for this visit were:     Lightheaded        You were seen by Danny Alarcon MD.      Follow-up Information     Follow up with Brandie Hill DO.    Specialty:  Internal Medicine    Contact information:    [address] [address]  [phone]          Discharge Instructions       Continue current medications, return if her headache, focal neurologic change, shortness of air, palpitations, chest pain, passing out or any other concern.    24 Hour Appointment Hotline       To make an appointment at any [place] clinic, call [phone] ([phone]). If you don't have a family doctor or clinic, we will help you find one. [facility] [place] clinics are conveniently located to serve the needs of you and your family.             Review of your medicines      Our records show that you are taking the medicines listed below. If these are incorrect, please call your family doctor or clinic.        Dose / Directions Last dose taken    ASPIRIN PO   Dose:  325 mg        Take 325 mg by mouth daily   Refills:  0        chlorhexidine 0.12 % solution   Commonly known as:  PERIDEX        SWISH AND SPIT TWICE DAILY AS DIRECTED   Refills:  99        DEPAKOTE PO   Dose:  500 mg        Take 500 mg by mouth 2 times daily   Refills:  0        MIRTAZAPINE PO   Dose:  45 mg        Take 45 mg by mouth At Bedtime   Refills:  0        order for DME   Quantity:  2 each        Equipment being ordered: bilateral below the knee LILIBETH stockings   Refills:  2        simvastatin 10 MG tablet   Commonly known as:  ZOCOR   Dose:  10 mg   Quantity:  90 tablet        Take 1  tablet (10 mg) by mouth At Bedtime   Refills:  3        TYLENOL PO   Dose:  1000 mg        Take 1,000 mg by mouth 2 times daily as needed for mild pain or fever   Refills:  0                Procedures and tests performed during your visit     Basic metabolic panel    CBC with platelets, differential    EKG 12 lead    Troponin I    UA reflex to Microscopic      Orders Needing Specimen Collection     None      Pending Results     No orders found from 8/23/2017 to 8/26/2017.            Pending Culture Results     No orders found from 8/23/2017 to 8/26/2017.            Pending Results Instructions     If you had any lab results that were not finalized at the time of your Discharge, you can call the ED Lab Result RN at [phone]. You will be contacted by this team for any positive Lab results or changes in treatment. The nurses are available 7 days a week from 10A to 6:30P.  You can leave a message 24 hours per day and they will return your call.        Test Results From Your Hospital Stay        8/25/2017  3:39 PM      Component Results     Component Value Ref Range & Units Status    Color Urine Yellow  Final    Appearance Urine Clear  Final    Glucose Urine Negative NEG^Negative mg/dL Final    Bilirubin Urine Negative NEG^Negative Final    Ketones Urine Negative NEG^Negative mg/dL Final    Specific Gravity Urine 1.012 1.003 - 1.035 Final    Blood Urine Negative NEG^Negative Final    pH Urine 8.0 (H) 5.0 - 7.0 pH Final    Protein Albumin Urine Negative NEG^Negative mg/dL Final    Urobilinogen mg/dL 2.0 0.0 - 2.0 mg/dL Final    Nitrite Urine Negative NEG^Negative Final    Leukocyte Esterase Urine Negative NEG^Negative Final    Source Midstream Urine  Final         8/25/2017  3:51 PM      Component Results     Component Value Ref Range & Units Status    WBC 3.7 (L) 4.0 - 11.0 10e9/L Final    RBC Count 3.81 3.8 - 5.2 10e12/L Final    Hemoglobin 12.9 11.7 - 15.7 g/dL Final    Hematocrit 37.8 35.0 - 47.0 % Final    MCV 99  78 - 100 fl Final    MCH 33.9 (H) 26.5 - 33.0 pg Final    MCHC 34.1 31.5 - 36.5 g/dL Final    RDW 13.2 10.0 - 15.0 % Final    Platelet Count 157 150 - 450 10e9/L Final    Diff Method Automated Method  Final    % Neutrophils 59.1 % Final    % Lymphocytes 19.4 % Final    % Monocytes 18.0 % Final    % Eosinophils 2.7 % Final    % Basophils 0.5 % Final    % Immature Granulocytes 0.3 % Final    Absolute Neutrophil 2.2 1.6 - 8.3 10e9/L Final    Absolute Lymphocytes 0.7 (L) 0.8 - 5.3 10e9/L Final    Absolute Monocytes 0.7 0.0 - 1.3 10e9/L Final    Absolute Eosinophils 0.1 0.0 - 0.7 10e9/L Final    Absolute Basophils 0.0 0.0 - 0.2 10e9/L Final    Abs Immature Granulocytes 0.0 0 - 0.4 10e9/L Final         8/25/2017  4:09 PM      Component Results     Component Value Ref Range & Units Status    Sodium 140 133 - 144 mmol/L Final    Potassium 4.1 3.4 - 5.3 mmol/L Final    Chloride 107 94 - 109 mmol/L Final    Carbon Dioxide 27 20 - 32 mmol/L Final    Anion Gap 6 3 - 14 mmol/L Final    Glucose 76 70 - 99 mg/dL Final    Urea Nitrogen 18 7 - 30 mg/dL Final    Creatinine 0.76 0.52 - 1.04 mg/dL Final    GFR Estimate 76 >60 mL/min/1.7m2 Final    Non  GFR Calc    GFR Estimate If Black >90 >60 mL/min/1.7m2 Final    African American GFR Calc    Calcium 8.3 (L) 8.5 - 10.1 mg/dL Final         8/25/2017  4:09 PM      Component Results     Component Value Ref Range & Units Status    Troponin I ES 0.030 0.000 - 0.045 ug/L Final    The 99th percentile for upper reference range is 0.045 ug/L.  Troponin values   in the range of 0.045 - 0.120 ug/L may be associated with risks of adverse   clinical events.                  Thank you for choosing [facility]       Thank you for choosing [facility] for your care. Our goal is always to provide you with excellent care. Hearing back from our patients is one way we can continue to improve our services. Please take a few minutes to complete the written survey that you may receive in the mail  "after you visit with us. Thank you!        [employer] Information     [employer] lets you send messages to your doctor, view your test results, renew your prescriptions, schedule appointments and more. To sign up, go to [email or web address] . Click on \"Log in\" on the left side of the screen, which will take you to the Welcome page. Then click on \"Sign up Now\" on the right side of the page.     You will be asked to enter the access code listed below, as well as some personal information. Please follow the directions to create your username and password.     Your access code is: [email or web address]  Expires: 2017  4:16 PM     Your access code will  in 90 days. If you need help or a new code, please call your [facility] clinic or [phone].        Care EveryWhere ID     This is your Care EveryWhere ID. This could be used by other organizations to access your [facility] medical records  [id number]        Equal Access to Services     [facility] : Hadii paxton myrick hadasho Somayuriali, waaxda luqadaha, qaybta kaalmada adeyolandayavirgie, hang [place] . So [facility] [phone].    ATENCIÓN: Si habla español, tiene a galaviz disposición servicios gratuitos de asistencia lingüística. Llame al [phone].    We comply with applicable federal civil rights laws and [place] laws. We do not discriminate on the basis of race, color, national origin, age, disability sex, sexual orientation or gender identity.            After Visit Summary       This is your record. Keep this with you and show to your community pharmacist(s) and doctor(s) at your next visit.                  "

## 2017-08-25 NOTE — DISCHARGE INSTRUCTIONS
Continue current medications, return if her headache, focal neurologic change, shortness of air, palpitations, chest pain, passing out or any other concern.

## 2017-08-25 NOTE — TELEPHONE ENCOUNTER
Dr Hill took Nora off her bp meds and is high she feels dizzy, upset and crying.. bp 151/83     Pavithra Boyce CSS

## 2017-08-25 NOTE — ED AVS SNAPSHOT
[facility] Emergency Department    [address] [address]    Phone:  [phone]    Fax:  [phone]                                       Nora Alarcon   MRN: [id number]    Department:  [facility] Emergency Department   Date of Visit:  8/25/2017           After Visit Summary Signature Page     I have received my discharge instructions, and my questions have been answered. I have discussed any challenges I see with this plan with the nurse or doctor.    ..........................................................................................................................................  Patient/Patient Representative Signature      ..........................................................................................................................................  Patient Representative Print Name and Relationship to Patient    ..................................................               ................................................  Date                                            Time    ..........................................................................................................................................  Reviewed by Signature/Title    ...................................................              ..............................................  Date                                                            Time

## 2017-08-25 NOTE — TELEPHONE ENCOUNTER
Patient calling regarding her bp of 153/81, HR = 70 she took a few minutes before her call to clinic.  She states Dr. Hill wanted to know if her bp was over 150/90.  Patient also states she feels lightheaded and dizzy as well as having numbness/tingling in bilateral hands/fingers.  Patient is upset and crying on the phone.  She admits to a history of anxiety.  Denies chest pain, SOB, vision changes, HA, fever or any other symptoms at this time.  RN advised based on protocol - further assessment in ED.  Patient states she will go to ED.  Helen BARKLEY RN
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
## 2017-11-03 ENCOUNTER — TELEPHONE (OUTPATIENT)
Dept: AUDIOLOGY | Facility: CLINIC | Age: 67
End: 2017-11-03

## 2017-11-03 NOTE — TELEPHONE ENCOUNTER
Reason for Call:  Other call back    Detailed comments: pt calling stating she would like to talk about the different hearing aid options. Her ins will cover the basic model     Phone Number Patient can be reached at: Home number on file [phone] (home)    Best Time: any     Can we leave a detailed message on this number? YES    Call taken on 11/3/2017 at 3:28 PM by Sushma Purcell

## 2017-11-06 NOTE — TELEPHONE ENCOUNTER
Answered questions on the hearing aid consultation process. Patient will schedule as desires.    Kathe Sneed M.A. F-AAA, #[id number]

## 2017-11-07 ENCOUNTER — OFFICE VISIT (OUTPATIENT)
Dept: AUDIOLOGY | Facility: CLINIC | Age: 67
End: 2017-11-07
Payer: COMMERCIAL

## 2017-11-07 DIAGNOSIS — H90.3 SENSORINEURAL HEARING LOSS, BILATERAL: Primary | ICD-10-CM

## 2017-11-07 DIAGNOSIS — E78.5 HYPERLIPIDEMIA LDL GOAL <130: ICD-10-CM

## 2017-11-07 LAB
CHOLEST SERPL-MCNC: 191 MG/DL
HDLC SERPL-MCNC: 92 MG/DL
LDLC SERPL CALC-MCNC: 78 MG/DL
NONHDLC SERPL-MCNC: 99 MG/DL
TRIGL SERPL-MCNC: 106 MG/DL

## 2017-11-07 PROCEDURE — 80061 LIPID PANEL: CPT | Performed by: INTERNAL MEDICINE

## 2017-11-07 PROCEDURE — 36415 COLL VENOUS BLD VENIPUNCTURE: CPT | Performed by: INTERNAL MEDICINE

## 2017-11-07 PROCEDURE — 99207 ZZC NO CHARGE LOS: CPT | Performed by: AUDIOLOGIST

## 2017-11-07 PROCEDURE — 92591 HC HEARING AID EXAM BINAURAL: CPT | Performed by: AUDIOLOGIST

## 2017-11-07 NOTE — PROGRESS NOTES
AUDIOLOGY REPORT    SUBJECTIVE: Nora Alarcon is a 66 year old female was seen in the Audiology Clinic at  [facility] on 11/07/17 to discuss concerns with hearing and functional communication difficulties. The patient was accompanied by their self. Nora has been seen previously on 6/6/2017, and results revealed a mild to moderate sensorineural hearing loss bilaterally. Nora notes difficulty with communication in a variety of listening situations.    OBJECTIVE:  Patient is a hearing aid candidate. Patient would like to move forward with a hearing aid evaluation today. Therefore, the patient was presented with different options for amplification to help aid in communication. Discussed styles, levels of technology and monaural vs. binaural fitting.     The hearing aid(s) mutually chosen were:  Binaural: Phonak Audeo B50-R  COLOR: P4  Fady  BATTERY SIZE: Rechargable  CANAL/ LENGTH: 1    Otoscopy revealed ears are clear of cerumen bilaterally.    ASSESSMENT:   No diagnosis found.    Reviewed purchase information and warranty information with patient. The 45 day trial period was explained to patient. The patient was given a copy of the [place] Department of Health consumer brochure on purchasing hearing instruments. Patient risk factors have been provided to the patient in writing prior to the sale of the hearing aid per FDA regulation. The risk factors are also available in the User Instructional Booklet to be presented on the day of the hearing aid fitting. Hearing aid(s) ordered. Hearing aid evaluation completed.    PLAN: Nora is scheduled to return in 2-3 weeks for a hearing aid fitting and programming. Purchase agreement will be completed on that date. Please contact this clinic with any questions or concerns.      Kathe WRIGHT-BEAU, #[id number]

## 2017-11-07 NOTE — MR AVS SNAPSHOT
"              After Visit Summary   2017    Nora Alarcon    MRN: [id number]           Patient Information     Date Of Birth          1950        Visit Information        Provider Department      2017 11:00 AM Kathe Sneed [facility]        Today's Diagnoses     Sensorineural hearing loss, bilateral    -  1       Follow-ups after your visit        Who to contact     If you have questions or need follow up information about today's clinic visit or your schedule please contact [facility] directly at [phone].  Normal or non-critical lab and imaging results will be communicated to you by Nuevolutionhart, letter or phone within 4 business days after the clinic has received the results. If you do not hear from us within 7 days, please contact the clinic through Nuevolutionhart or phone. If you have a critical or abnormal lab result, we will notify you by phone as soon as possible.  Submit refill requests through Clerk or call your pharmacy and they will forward the refill request to us. Please allow 3 business days for your refill to be completed.          Additional Information About Your Visit        MyChart Information     Clerk lets you send messages to your doctor, view your test results, renew your prescriptions, schedule appointments and more. To sign up, go to [email or web address] . Click on \"Log in\" on the left side of the screen, which will take you to the Welcome page. Then click on \"Sign up Now\" on the right side of the page.     You will be asked to enter the access code listed below, as well as some personal information. Please follow the directions to create your username and password.     Your access code is: [email or web address]  Expires: 2017  3:16 PM     Your access code will  in 90 days. If you need help or a new code, please call your [facility] or [phone].        Care EveryWhere ID     This is your Care EveryWhere ID. This could be used " by other organizations to access your [place] medical records  [id number]         Blood Pressure from Last 3 Encounters:   08/25/17 138/58   06/27/17 121/70   05/01/17 122/59    Weight from Last 3 Encounters:   08/25/17 140 lb (63.5 kg)   06/27/17 138 lb (62.6 kg)   05/01/17 133 lb (60.3 kg)              We Performed the Following     HEARING AID EXAM BINAURAL        Primary Care Provider Office Phone # Fax #    Brandie Hill  [phone] [address] [address]        Equal Access to Services     BONNIE BUTLER : Hadii aad ku hadasho Somayuriali, waaxda luqadaha, qaybta kaalmada adeegyada, hang chaudhary. So [facility] [phone].    ATENCIÓN: Si habla español, tiene a galaviz disposición servicios gratuitos de asistencia lingüística. Llame al [phone].    We comply with applicable federal civil rights laws and [place] laws. We do not discriminate on the basis of race, color, national origin, age, disability, sex, sexual orientation, or gender identity.            Thank you!     Thank you for choosing [facility]  for your care. Our goal is always to provide you with excellent care. Hearing back from our patients is one way we can continue to improve our services. Please take a few minutes to complete the written survey that you may receive in the mail after your visit with us. Thank you!             Your Updated Medication List - Protect others around you: Learn how to safely use, store and throw away your medicines at www.disposemymeds.org.          This list is accurate as of: 11/7/17  2:06 PM.  Always use your most recent med list.                   Brand Name Dispense Instructions for use Diagnosis    ASPIRIN PO      Take 325 mg by mouth daily        chlorhexidine 0.12 % solution    PERIDEX     SWISH AND SPIT TWICE DAILY AS DIRECTED        DEPAKOTE PO      Take 500 mg by mouth 2 times daily        MIRTAZAPINE PO      Take 45 mg by  mouth At Bedtime        order for DME     2 each    Equipment being ordered: bilateral below the knee LILIBETH stockings    Bilateral leg edema       simvastatin 10 MG tablet    ZOCOR    90 tablet    Take 1 tablet (10 mg) by mouth At Bedtime    Hyperlipidemia LDL goal <130       TYLENOL PO      Take 1,000 mg by mouth 2 times daily as needed for mild pain or fever

## 2017-11-10 ENCOUNTER — TELEPHONE (OUTPATIENT)
Dept: AUDIOLOGY | Facility: CLINIC | Age: 67
End: 2017-11-10

## 2017-11-10 NOTE — TELEPHONE ENCOUNTER
Pt called and has some more questions regarding hearing aids. Please advise.    Georgina Rosas  [facility]

## 2017-11-16 ENCOUNTER — TELEPHONE (OUTPATIENT)
Dept: AUDIOLOGY | Facility: CLINIC | Age: 67
End: 2017-11-16

## 2017-11-16 NOTE — TELEPHONE ENCOUNTER
Reason for Call:  Other call back    Detailed comments: pt calling stating she has some question before coming in before getting fitted for hearing aids.     Phone Number Patient can be reached at: Home number on file [phone] (home)    Best Time: any     Can we leave a detailed message on this number? YES    Call taken on 11/16/2017 at 1:42 PM by Sushma Purcell

## 2017-11-17 NOTE — TELEPHONE ENCOUNTER
Again questions on her insurance and the hearing aids ordered. Reassured patient and reviewed all that was decided.    Kathe Sneed M.A. -[facility], #[id number]

## 2017-12-04 ENCOUNTER — TELEPHONE (OUTPATIENT)
Dept: AUDIOLOGY | Facility: CLINIC | Age: 67
End: 2017-12-04

## 2017-12-04 NOTE — TELEPHONE ENCOUNTER
Reason for Call:  Other     Detailed comments: Before picking up hearing aids, needs to know exact cost. Said she can bring insurance card today if needed. Has some questions regarding the hearing aids.  Appt to  hearing aids is scheduled for 12/11. - Please advise    Phone Number Patient can be reached at: Home number on file [phone] (home) or Cell #: [phone]    Best Time: Any    Can we leave a detailed message on this number? YES (on cell phone)    Call taken on 12/4/2017 at 9:33 AM by Denise Behrendt

## 2017-12-05 NOTE — TELEPHONE ENCOUNTER
Again discussed hearing aid insurance coverage. Recommend patient contact her insurance and inquire about coverage for the hearing aid recommended. Patient states she will do this.    Kathe MURRAY, #[id number]

## 2017-12-06 ENCOUNTER — NURSE TRIAGE (OUTPATIENT)
Dept: NURSING | Facility: CLINIC | Age: 67
End: 2017-12-06

## 2017-12-07 NOTE — TELEPHONE ENCOUNTER
William calling from  stating pt called them to get info on cost of hearing aids.   Please call pt back to let her know if this will be billed out as standard or upgrade.  Explain what the difference is between the standard and upgrade.   This will determine if the pt has to pay for the hearing aid or not.   standard is covered  Upgrade pt would have to pay    Please call pt     Sushma Purcell  [facility]

## 2017-12-11 ENCOUNTER — OFFICE VISIT (OUTPATIENT)
Dept: AUDIOLOGY | Facility: CLINIC | Age: 67
End: 2017-12-11
Payer: COMMERCIAL

## 2017-12-11 DIAGNOSIS — H90.3 SENSORINEURAL HEARING LOSS, BILATERAL: Primary | ICD-10-CM

## 2017-12-11 PROCEDURE — 99207 ZZC NO CHARGE LOS: CPT | Performed by: AUDIOLOGIST

## 2017-12-11 PROCEDURE — 92593 HC HEARING AID CHECK, BINAURAL: CPT | Performed by: AUDIOLOGIST

## 2017-12-11 PROCEDURE — V5020 CONFORMITY EVALUATION: HCPCS | Mod: RT | Performed by: AUDIOLOGIST

## 2017-12-11 PROCEDURE — V5011 HEARING AID FITTING/CHECKING: HCPCS | Mod: RT | Performed by: AUDIOLOGIST

## 2017-12-11 PROCEDURE — V5020 CONFORMITY EVALUATION: HCPCS | Mod: LT | Performed by: AUDIOLOGIST

## 2017-12-11 PROCEDURE — V5261 HEARING AID, DIGIT, BIN, BTE: HCPCS | Performed by: AUDIOLOGIST

## 2017-12-11 PROCEDURE — V5267 HEARING AID SUP/ACCESS/DEV: HCPCS | Performed by: AUDIOLOGIST

## 2017-12-11 PROCEDURE — V5160 DISPENSING FEE BINAURAL: HCPCS | Performed by: AUDIOLOGIST

## 2017-12-11 PROCEDURE — V5011 HEARING AID FITTING/CHECKING: HCPCS | Mod: LT | Performed by: AUDIOLOGIST

## 2017-12-11 NOTE — MR AVS SNAPSHOT
"              After Visit Summary   12/11/2017    Nora Alarcon    MRN: [id number]           Patient Information     Date Of Birth          1950        Visit Information        Provider Department      12/11/2017 10:30 AM Kathe Sneed, [facility]        Today's Diagnoses     Sensorineural hearing loss, bilateral    -  1       Follow-ups after your visit        Your next 10 appointments already scheduled     Jan 08, 2018 11:30 AM CST   [facility]    [address] [facility] [address] [phone]            Abraham 15, 2018  1:15 PM CST   [facility]    [address] [address] [phone]              Who to contact     If you have questions or need follow up information about today's clinic visit or your schedule please contact [facility] directly at [phone].  Normal or non-critical lab and imaging results will be communicated to you by OR Productivityhart, letter or phone within 4 business days after the clinic has received the results. If you do not hear from us within 7 days, please contact the clinic through OR Productivityhart or phone. If you have a critical or abnormal lab result, we will notify you by phone as soon as possible.  Submit refill requests through [employer] or call your pharmacy and they will forward the refill request to us. Please allow 3 business days for your refill to be completed.          Additional Information About Your Visit        OR Productivityhart Information     End[employer] The[employer] lets you send messages to your doctor, view your test results, renew your prescriptions, schedule appointments and more. To sign up, go to [email or web address] . Click on \"Log in\" on the left side of the screen, which will take you to the Welcome page. Then click on \"Sign up Now\" on the right side of the " page.     You will be asked to enter the access code listed below, as well as some personal information. Please follow the directions to create your username and password.     Your access code is: [email or web address]  Expires: 3/11/2018 12:41 PM     Your access code will  in 90 days. If you need help or a new code, please call your [place] clinic or [phone].        Care EveryWhere ID     This is your Care EveryWhere ID. This could be used by other organizations to access your [place] medical records  [id number]         Blood Pressure from Last 3 Encounters:   17 138/58   17 121/70   17 122/59    Weight from Last 3 Encounters:   17 140 lb (63.5 kg)   17 138 lb (62.6 kg)   17 133 lb (60.3 kg)              We Performed the Following     DISPENSING FEE, BINAURAL HEARING AID     HEARING AID BTE DIGITAL, BINAURAL     HEARING AID CHECK, BINAURAL     HEARING AID CONFORMITY EVALUATION     HEARING AID FIT/ORIENTATION/CHECK     HEARING AID SUPPLY        Primary Care Provider Office Phone # Fax #    Brandie Hill, [phone] [address] [address]        Equal Access to Services     BONNIE BUTLER AH: Hadii aad ku hadasho Soomaali, waaxda luqadaha, qaybta kaalmada adeegyada, waxay idiin hayalinan nicole chaudhary. So [facility] [phone].    ATENCIÓN: Si habla español, tiene a galaviz disposición servicios gratuitos de asistencia lingüística. Llame al [phone].    We comply with applicable federal civil rights laws and [place] laws. We do not discriminate on the basis of race, color, national origin, age, disability, sex, sexual orientation, or gender identity.            Thank you!     Thank you for choosing [facility]  for your care. Our goal is always to provide you with excellent care. Hearing back from our patients is one way we can continue to improve our services. Please take a few minutes to complete the written survey that  you may receive in the mail after your visit with us. Thank you!             Your Updated Medication List - Protect others around you: Learn how to safely use, store and throw away your medicines at www.disposemymeds.org.          This list is accurate as of: 12/11/17 12:41 PM.  Always use your most recent med list.                   Brand Name Dispense Instructions for use Diagnosis    ASPIRIN PO      Take 325 mg by mouth daily        chlorhexidine 0.12 % solution    PERIDEX     SWISH AND SPIT TWICE DAILY AS DIRECTED        DEPAKOTE PO      Take 500 mg by mouth 2 times daily        MIRTAZAPINE PO      Take 45 mg by mouth At Bedtime        order for DME     2 each    Equipment being ordered: bilateral below the knee LILIBETH stockings    Bilateral leg edema       simvastatin 10 MG tablet    ZOCOR    90 tablet    Take 1 tablet (10 mg) by mouth At Bedtime    Hyperlipidemia LDL goal <130       TYLENOL PO      Take 1,000 mg by mouth 2 times daily as needed for mild pain or fever

## 2017-12-11 NOTE — PROGRESS NOTES
AUDIOLOGY REPORT    SUBJECTIVE: Nora Alarcon, a 66 year old female, was seen in the Audiology Clinic at [facility] today for a Binaural hearing aid fitting. Previous results have revealed a bilateral mild to moderate sensorineural hearing loss. The patient is a first time hearing aid user.    OBJECTIVE:  Prior to fitting, a hearing aid check was performed to ensure device functionality. The hearing aid conformity evaluation was completed.The hearing aids were placed and they provided a good fit. Real-ear-probe-microphone measurements were completed on the [place] system and were a good match to NAL-NL2 target with soft sounds audible, moderate sounds comfortable, and loud sounds below discomfort. UCLs are verified through maximum power output measures and demonstrate appropriate limiting of loud inputs. Ms. Alarcon was oriented to proper hearing aid use, care, cleaning (no water, dry brush), batteries (rechargable, low-battery signal), aid insertion/removal, user booklet, warranty information, storage cases, and other hearing aid details. The patient confirmed understanding of hearing aid use and care, and showed proper insertion of hearing aid and batteries while in the office today. Ms. Alarcon reported good volume and sound quality today.    EAR(S) FIT: Binaural  MA HEARING AID MODEL NAME:  [employer] B50-R  HEARING AID STYLE: NICK  EARMOLDS/TIP/ LINK: #1 xS  SERIAL NUMBERS: Right: [id number]; Left: [id number]  WARRANTY END DATE: 2/5/2020    CHARGES:   Hearing Aid Check: Binaural, 53492, $81.00  Dispensing Fee: Binaural, , $500.00  Fit/Orientation: Binaural, , $322.00  Hearing Aid Conformity Evaluation: , Qty:2  Hearing Aid Digital: Binaural, BTE,     ASSESSMENT: Binaural hearing aid fitting completed today. Verification measures were performed. The 45 day trial period was explained to patient, and they expressed understanding. Ms. Alarcon signed the  Hearing Aid Purchase Agreement and was given a copy, as well as details on her hearing aids.    PLAN: Ms. Alarcon will return for follow-up in 2-3 weeks for a hearing aid review appointment. Please call this clinic with questions regarding today s appointment.    Kathe Sneed M.A. -[facility], #[id number]

## 2018-01-08 ENCOUNTER — OFFICE VISIT (OUTPATIENT)
Dept: AUDIOLOGY | Facility: CLINIC | Age: 68
End: 2018-01-08
Payer: COMMERCIAL

## 2018-01-08 DIAGNOSIS — H90.3 SENSORINEURAL HEARING LOSS, BILATERAL: Primary | ICD-10-CM

## 2018-01-08 PROCEDURE — V5299 HEARING SERVICE: HCPCS | Performed by: AUDIOLOGIST

## 2018-01-08 PROCEDURE — 99207 ZZC NO CHARGE LOS: CPT | Performed by: AUDIOLOGIST

## 2018-01-08 NOTE — MR AVS SNAPSHOT
"              After Visit Summary   1/8/2018    Nora Alarcon    MRN: [id number]           Patient Information     Date Of Birth          1950        Visit Information        Provider Department      1/8/2018 11:30 AM Kathe Sneed, Jum[facility]        Today's Diagnoses     Sensorineural hearing loss, bilateral    -  1       Follow-ups after your visit        Your next 10 appointments already scheduled     Abraham 15, 2018  1:15 PM CST   [facility] Visit with Armando [facility]ivas MD   [facility]    [address] [address]   [phone]            Jan 23, 2018 11:30 AM CST   Return Visit with Jumana Story   [facility]    [address] [address]   [phone]              Who to contact     If you have questions or need follow up information about today's clinic visit or your schedule please contact [facility] directly at [phone].  Normal or non-critical lab and imaging results will be communicated to you by MyChart, letter or phone within 4 business days after the clinic has received the results. If you do not hear from us within 7 days, please contact the clinic through Sleep.FMhart or phone. If you have a critical or abnormal lab result, we will notify you by phone as soon as possible.  Submit refill requests through SWEEPiO or call your pharmacy and they will forward the refill request to us. Please allow 3 business days for your refill to be completed.          Additional Information About Your Visit        Sleep.FMhart Information     SWEEPiO lets you send messages to your doctor, view your test results, renew your prescriptions, schedule appointments and more. To sign up, go to [email or web address] . Click on \"Log in\" on the left side of the screen, which will take you to the Welcome page. Then click on \"Sign up Now\" on the right side of the " page.     You will be asked to enter the access code listed below, as well as some personal information. Please follow the directions to create your username and password.     Your access code is: [email or web address]  Expires: 3/11/2018 12:41 PM     Your access code will  in 90 days. If you need help or a new code, please call your [place] clinic or [phone].        Care EveryWhere ID     This is your Care EveryWhere ID. This could be used by other organizations to access your [place] medical records  [id number]         Blood Pressure from Last 3 Encounters:   17 138/58   17 121/70   17 122/59    Weight from Last 3 Encounters:   17 140 lb (63.5 kg)   17 138 lb (62.6 kg)   17 133 lb (60.3 kg)              We Performed the Following     HEARING AID CHECK/NO CHARGE        Primary Care Provider Office Phone # Fax #    Brandie Leonela Hill,  [phone] [address] [facility] [address]        Equal Access to Services     BONNIE BUTLER : Hadii aad ku hadasho Soomaali, waaxda luqadaha, qaybta kaalmada adeegyada, hang [place] . So [facility] [phone].    ATENCIÓN: Si habla español, tiene a galaviz disposición servicios gratuitos de asistencia lingüística. Llame al [phone].    We comply with applicable federal civil rights laws and [place] laws. We do not discriminate on the basis of race, color, national origin, age, disability, sex, sexual orientation, or gender identity.            Thank you!     Thank you for choosing [facility]  for your care. Our goal is always to provide you with excellent care. Hearing back from our patients is one way we can continue to improve our services. Please take a few minutes to complete the written survey that you may receive in the mail after your visit with us. Thank you!             Your Updated Medication List - Protect others around you: Learn how to safely use, store and throw  away your medicines at www.disposemymeds.org.          This list is accurate as of: 1/8/18 12:06 PM.  Always use your most recent med list.                   Brand Name Dispense Instructions for use Diagnosis    ASPIRIN PO      Take 325 mg by mouth daily        chlorhexidine 0.12 % solution    PERIDEX     SWISH AND SPIT TWICE DAILY AS DIRECTED        DEPAKOTE PO      Take 500 mg by mouth 2 times daily        MIRTAZAPINE PO      Take 45 mg by mouth At Bedtime        order for DME     2 each    Equipment being ordered: bilateral below the knee LILIBETH stockings    Bilateral leg edema       simvastatin 10 MG tablet    ZOCOR    90 tablet    Take 1 tablet (10 mg) by mouth At Bedtime    Hyperlipidemia LDL goal <130       TYLENOL PO      Take 1,000 mg by mouth 2 times daily as needed for mild pain or fever

## 2018-01-08 NOTE — PROGRESS NOTES
67 year old female comes in for a 3 week hearing aid progress check. Patient is using [employer]ak E[employer]o B50-R hearing aids bilaterally that were fit on 12/11/2017. Patient is a first time hearing aid user.    Reviewed hearing aid care and use. Patient reports she only wore the hearing aids part time. Discussed need for full time hearing aid usage and the need to practise hearing aid insertion and removal daily. Patient states she is hearing much better with the hearing aids on and likes them when she is wearing them. Patient reports with her mental status it is difficult to put them in on some days.     See chart in the hearing aid room.     Return to clinic for 2 week follow up.    Kathe Sneed M.A. -[facility], #[id number]

## 2018-01-13 ENCOUNTER — APPOINTMENT (OUTPATIENT)
Dept: GENERAL RADIOLOGY | Facility: CLINIC | Age: 68
End: 2018-01-13
Attending: NURSE PRACTITIONER
Payer: MEDICARE

## 2018-01-13 ENCOUNTER — HOSPITAL ENCOUNTER (EMERGENCY)
Facility: CLINIC | Age: 68
Discharge: HOME | End: 2018-01-13
Attending: NURSE PRACTITIONER | Admitting: NURSE PRACTITIONER
Payer: MEDICARE

## 2018-01-13 VITALS
RESPIRATION RATE: 18 BRPM | DIASTOLIC BLOOD PRESSURE: 78 MMHG | HEART RATE: 95 BPM | SYSTOLIC BLOOD PRESSURE: 151 MMHG | OXYGEN SATURATION: 97 %

## 2018-01-13 DIAGNOSIS — S63.501A WRIST SPRAIN, RIGHT, INITIAL ENCOUNTER: ICD-10-CM

## 2018-01-13 PROCEDURE — G0463 HOSPITAL OUTPT CLINIC VISIT: HCPCS

## 2018-01-13 PROCEDURE — 99213 OFFICE O/P EST LOW 20 MIN: CPT | Performed by: NURSE PRACTITIONER

## 2018-01-13 PROCEDURE — 73110 X-RAY EXAM OF WRIST: CPT | Mod: RT

## 2018-01-13 NOTE — ED AVS SNAPSHOT
[facility]    [address] [address]    Phone:  [phone]    Fax:  [phone]                                       Nora Alarcon   MRN: [id number]    Department:  [facility]   Date of Visit:  1/13/2018           After Visit Summary Signature Page     I have received my discharge instructions, and my questions have been answered. I have discussed any challenges I see with this plan with the nurse or doctor.    ..........................................................................................................................................  Patient/Patient Representative Signature      ..........................................................................................................................................  Patient Representative Print Name and Relationship to Patient    ..................................................               ................................................  Date                                            Time    ..........................................................................................................................................  Reviewed by Signature/Title    ...................................................              ..............................................  Date                                                            Time
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
## 2018-01-13 NOTE — ED NOTES
Patient here for bilateral wrist pain - pain started after catching herself after getting her cat.  Patient presents ambulatory to the urgent care.

## 2018-01-13 NOTE — ED AVS SNAPSHOT
[facility]    [address] [address]    Phone:  [phone]    Fax:  [phone]                                       Nora Alarcon   MRN: [id number]    Department:  [facility]   Date of Visit:  1/13/2018           Patient Information     Date Of Birth          1950        Your diagnoses for this visit were:     Wrist sprain, right, initial encounter        You were seen by Marni Clements APRN CNP.      Follow-up Information     Follow up with Brandie Hill DO.    Specialty:  Internal Medicine    Why:  As needed    Contact information:    [address] [address]  [phone]        Discharge References/Attachments     WRIST SPRAIN (ENGLISH)      Future Appointments        Provider Department Dept Phone Center    1/15/2018 1:15 PM Armando Rivas MD [facility] [phone] [facility]    1/23/2018 11:30 AM Jumana Olson [facility] [phone] [facility]      24 Hour Appointment Hotline       To make an appointment at any [facility], call [phone] ([phone]). If you don't have a family doctor or clinic, we will help you find one. [facility] are conveniently located to serve the needs of you and your family.          ED Discharge Orders     [employer]                    Review of your medicines      Our records show that you are taking the medicines listed below. If these are incorrect, please call your family doctor or clinic.        Dose / Directions Last dose taken    ASPIRIN PO   Dose:  325 mg        Take 325 mg by mouth daily   Refills:  0        chlorhexidine 0.12 % solution   Commonly known as:  PERIDEX        SWISH AND SPIT TWICE DAILY AS DIRECTED   Refills:  99        DEPAKOTE PO   Dose:  500 mg        Take 500 mg by mouth 2 times daily   Refills:  0        MIRTAZAPINE PO   Dose:  45 mg        Take 45 mg by mouth At Bedtime   Refills:   0        order for DME   Quantity:  2 each        Equipment being ordered: bilateral below the knee LILIBETH stockings   Refills:  2        simvastatin 10 MG tablet   Commonly known as:  ZOCOR   Dose:  10 mg   Quantity:  90 tablet        Take 1 tablet (10 mg) by mouth At Bedtime   Refills:  3        TYLENOL PO   Dose:  1000 mg        Take 1,000 mg by mouth 2 times daily as needed for mild pain or fever   Refills:  0                Procedures and tests performed during your visit     Wrist XR, G/E 3 views, right      Orders Needing Specimen Collection     None      Pending Results     No orders found from 1/11/2018 to 1/14/2018.            Pending Culture Results     No orders found from 1/11/2018 to 1/14/2018.            Pending Results Instructions     If you had any lab results that were not finalized at the time of your Discharge, you can call the ED Lab Result RN at [phone]. You will be contacted by this team for any positive Lab results or changes in treatment. The nurses are available 7 days a week from 10A to 6:30P.  You can leave a message 24 hours per day and they will return your call.        Test Results From Your Hospital Stay        1/13/2018  1:03 PM      Narrative     WRIST RIGHT THREE OR MORE VIEWS January 13, 2018 12:54 PM     HISTORY: Fall, wrist pain.    COMPARISON: None.        Impression     IMPRESSION: Bones appear osteopenic. No fractures are identified. Bony  alignment is within normal limits. No significant degenerative changes  in the carpals. There is chondrocalcinosis of the triangular  fibrocartilage.    EMERSON LYNN MD                Thank you for choosing [facility]       Thank you for choosing [facility] for your care. Our goal is always to provide you with excellent care. Hearing back from our patients is one way we can continue to improve our services. Please take a few minutes to complete the written survey that you may receive in the mail after you visit with us. Thank you!       "  MyChart Information     [employer] lets you send messages to your doctor, view your test results, renew your prescriptions, schedule appointments and more. To sign up, go to [email or web address] . Click on \"Log in\" on the left side of the screen, which will take you to the Welcome page. Then click on \"Sign up Now\" on the right side of the page.     You will be asked to enter the access code listed below, as well as some personal information. Please follow the directions to create your username and password.     Your access code is: [email or web address]  Expires: 3/11/2018 12:41 PM     Your access code will  in 90 days. If you need help or a new code, please call your [place] clinic or [phone].        Care EveryWhere ID     This is your Care EveryWhere ID. This could be used by other organizations to access your [place] medical records  [id number]        Equal Access to Services     BONNIE BUTLER AH: Hadjolie limao [place], waaxda luqadaha, qaybta kaalmada adeelie, hang chaudhary. So [facility] [phone].    ATENCIÓN: Si habla español, tiene a galaviz disposición servicios gratuitos de asistencia lingüística. Llame al [phone].    We comply with applicable federal civil rights laws and [place] laws. We do not discriminate on the basis of race, color, national origin, age, disability, sex, sexual orientation, or gender identity.            After Visit Summary       This is your record. Keep this with you and show to your community pharmacist(s) and doctor(s) at your next visit.                  "

## 2018-01-15 ENCOUNTER — OFFICE VISIT (OUTPATIENT)
Dept: DERMATOLOGY | Facility: CLINIC | Age: 68
End: 2018-01-15
Payer: COMMERCIAL

## 2018-01-15 ENCOUNTER — TELEPHONE (OUTPATIENT)
Dept: DERMATOLOGY | Facility: CLINIC | Age: 68
End: 2018-01-15

## 2018-01-15 VITALS — DIASTOLIC BLOOD PRESSURE: 96 MMHG | SYSTOLIC BLOOD PRESSURE: 156 MMHG | HEART RATE: 93 BPM | HEIGHT: 67 IN

## 2018-01-15 DIAGNOSIS — F31.74 MANIC DISORDER, RECURRENT EPISODE, IN FULL REMISSION (H): Primary | ICD-10-CM

## 2018-01-15 DIAGNOSIS — L82.1 SK (SEBORRHEIC KERATOSIS): ICD-10-CM

## 2018-01-15 DIAGNOSIS — L82.0 INFLAMED SEBORRHEIC KERATOSIS: ICD-10-CM

## 2018-01-15 DIAGNOSIS — L81.4 LENTIGO: ICD-10-CM

## 2018-01-15 DIAGNOSIS — L57.0 AK (ACTINIC KERATOSIS): Primary | ICD-10-CM

## 2018-01-15 DIAGNOSIS — E78.5 HYPERLIPIDEMIA LDL GOAL <130: ICD-10-CM

## 2018-01-15 LAB
ALBUMIN SERPL-MCNC: 3.9 G/DL (ref 3.4–5)
ALP SERPL-CCNC: 68 U/L (ref 40–150)
ALT SERPL W P-5'-P-CCNC: 22 U/L (ref 0–50)
ANION GAP SERPL CALCULATED.3IONS-SCNC: 7 MMOL/L (ref 3–14)
AST SERPL W P-5'-P-CCNC: 25 U/L (ref 0–45)
BILIRUB SERPL-MCNC: 0.6 MG/DL (ref 0.2–1.3)
BUN SERPL-MCNC: 27 MG/DL (ref 7–30)
CALCIUM SERPL-MCNC: 9.2 MG/DL (ref 8.5–10.1)
CHLORIDE SERPL-SCNC: 102 MMOL/L (ref 94–109)
CO2 SERPL-SCNC: 27 MMOL/L (ref 20–32)
CREAT SERPL-MCNC: 0.79 MG/DL (ref 0.52–1.04)
GFR SERPL CREATININE-BSD FRML MDRD: 73 ML/MIN/1.7M2
GLUCOSE SERPL-MCNC: 83 MG/DL (ref 70–99)
POTASSIUM SERPL-SCNC: 3.6 MMOL/L (ref 3.4–5.3)
PROT SERPL-MCNC: 9 G/DL (ref 6.8–8.8)
SODIUM SERPL-SCNC: 136 MMOL/L (ref 133–144)
VALPROATE SERPL-MCNC: 78 MG/L (ref 50–100)

## 2018-01-15 PROCEDURE — 88331 PATH CONSLTJ SURG 1 BLK 1SPC: CPT | Performed by: DERMATOLOGY

## 2018-01-15 PROCEDURE — 11101 HC BIOPSY SKIN/SUBQ/MUC MEM, EACH ADDTL LESION: CPT | Performed by: DERMATOLOGY

## 2018-01-15 PROCEDURE — 80164 ASSAY DIPROPYLACETIC ACD TOT: CPT | Performed by: PSYCHIATRY & NEUROLOGY

## 2018-01-15 PROCEDURE — 99214 OFFICE O/P EST MOD 30 MIN: CPT | Mod: 25 | Performed by: DERMATOLOGY

## 2018-01-15 PROCEDURE — 11100 HC BIOPSY SKIN/SUBQ/MUC MEM, SINGLE LESION: CPT | Performed by: DERMATOLOGY

## 2018-01-15 PROCEDURE — 36415 COLL VENOUS BLD VENIPUNCTURE: CPT | Performed by: PSYCHIATRY & NEUROLOGY

## 2018-01-15 PROCEDURE — 80053 COMPREHEN METABOLIC PANEL: CPT | Performed by: PSYCHIATRY & NEUROLOGY

## 2018-01-15 NOTE — NURSING NOTE
"Initial BP (!) 156/96  Pulse 93  Ht 1.702 m (5' 7\") Estimated body mass index is 22.6 kg/(m^2) as calculated from the following:    Height as of 8/25/17: 1.676 m (5' 6\").    Weight as of 8/25/17: 63.5 kg (140 lb). .      "
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
## 2018-01-15 NOTE — TELEPHONE ENCOUNTER
----- Message from Armando Rivas MD sent at 1/15/2018  2:11 PM CST -----  L nasal sidewall inflamed seborrheic keratosis no treatment  L forehead superior actinic keratosis cryo  L forehead inferior actinic keratosis cryo
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
## 2018-01-15 NOTE — TELEPHONE ENCOUNTER
Called pt and no answer. Pt will need one clinic visit for cryo therapy.  Abimbola RICCI RN BSN PHN  Specialty Clinics

## 2018-01-15 NOTE — MR AVS SNAPSHOT
After Visit Summary   1/15/2018    Noar Alarcon    MRN: [id number]           Patient Information     Date Of Birth          1950        Visit Information        Provider Department      1/15/2018 1:15 PM Armando Rivas MD [facility]        Care Instructions          Wound Care Instructions     FOR SUPERFICIAL WOUNDS     [facility] [phone]    [facility] [phone]                       AFTER 24 HOURS YOU SHOULD REMOVE THE BANDAGE AND BEGIN DAILY DRESSING CHANGES AS FOLLOWS:     1) Remove Dressing.     2) Clean and dry the area with tap water using a Q-tip or sterile gauze pad.     3) Apply Vaseline, Aquaphor, Polysporin ointment or Bacitracin ointment over entire wound.  Do NOT use Neosporin ointment.     4) Cover the wound with a band-aid, or a sterile non-stick gauze pad and micropore paper tape      REPEAT THESE INSTRUCTIONS AT LEAST ONCE A DAY UNTIL THE WOUND HAS COMPLETELY HEALED.    It is an old wives tale that a wound heals better when it is exposed to air and allowed to dry out. The wound will heal faster with a better cosmetic result if it is kept moist with ointment and covered with a bandage.    **Do not let the wound dry out.**      Supplies Needed:      *Cotton tipped applicators (Q-tips)    *Polysporin Ointment or Bacitracin Ointment (NOT NEOSPORIN)    *Band-aids or non-stick gauze pads and micropore paper tape.      PATIENT INFORMATION:    During the healing process you will notice a number of changes. All wounds develop a small halo of redness surrounding the wound.  This means healing is occurring. Severe itching with extensive redness usually indicates sensitivity to the ointment or bandage tape used to dress the wound.  You should call our office if this develops.      Swelling  and/or discoloration around your surgical site is common, particularly when performed around the eye.    All wounds normally drain.  The  larger the wound the more drainage there will be.  After 7-10 days, you will notice the wound beginning to shrink and new skin will begin to grow.  The wound is healed when you can see skin has formed over the entire area.  A healed wound has a healthy, shiny look to the surface and is red to dark pink in color to normalize.  Wounds may take approximately 4-6 weeks to heal.  Larger wounds may take 6-8 weeks.  After the wound is healed you may discontinue dressing changes.    You may experience a sensation of tightness as your wound heals. This is normal and will gradually subside.    Your healed wound may be sensitive to temperature changes. This sensitivity improves with time, but if you re having a lot of discomfort, try to avoid temperature extremes.    Patients frequently experience itching after their wound appears to have healed because of the continue healing under the skin.  Plain Vaseline will help relieve the itching.        POSSIBLE COMPLICATIONS    BLEEDIN. Leave the bandage in place.  2. Use tightly rolled up gauze or a cloth to apply direct pressure over the bandage for 30  minutes.  3. Reapply pressure for an additional 30 minutes if necessary  4. Use additional gauze and tape to maintain pressure once the bleeding has stopped.            Follow-ups after your visit        Your next 10 appointments already scheduled     Abraham 15, 2018  1:35 PM CST   [facility] [place] [facility]   [facility]    [address] [address]   [phone]           Please do not eat 10-12 hours before your appointment if you are coming in fasting for labs on lipids, cholesterol, or glucose (sugar). This does not apply to pregnant women. Water, hot tea and black coffee (with nothing added) are okay. Do not drink other fluids, diet soda or chew gum.            2018 11:30 AM CST   Return Visit with Jumana Story   [facility] ([facility]  "[place])    [address]  [facility] [address]   [phone]              Who to contact     If you have questions or need follow up information about today's clinic visit or your schedule please contact [facility] directly at [phone].  Normal or non-critical lab and imaging results will be communicated to you by MyChart, letter or phone within 4 business days after the clinic has received the results. If you do not hear from us within 7 days, please contact the clinic through MyChart or phone. If you have a critical or abnormal lab result, we will notify you by phone as soon as possible.  Submit refill requests through [employer] or call your pharmacy and they will forward the refill request to us. Please allow 3 business days for your refill to be completed.          Additional Information About Your Visit        MyChart Information     Patient[employer]eeper lets you send messages to your doctor, view your test results, renew your prescriptions, schedule appointments and more. To sign up, go to [email or web address] . Click on \"Log in\" on the left side of the screen, which will take you to the Welcome page. Then click on \"Sign up Now\" on the right side of the page.     You will be asked to enter the access code listed below, as well as some personal information. Please follow the directions to create your username and password.     Your access code is: [email or web address]  Expires: 3/11/2018 12:41 PM     Your access code will  in 90 days. If you need help or a new code, please call your [facility] or [phone].        Care EveryWhere ID     This is your Care EveryWhere ID. This could be used by other organizations to access your [facility] medical records  [id number]        Your Vitals Were     Pulse Height                93 1.702 m (5' 7\")           Blood Pressure from Last 3 Encounters:   01/15/18 (!) 156/96   18 151/78   17 138/58    Weight from Last 3 Encounters:   17 " 63.5 kg (140 lb)   06/27/17 62.6 kg (138 lb)   05/01/17 60.3 kg (133 lb)              Today, you had the following     No orders found for display       Primary Care Provider Office Phone # Fax #    Brandie Hill [phone] [address] [facility] [address]        Equal Access to Services     BONNIE BUTLER : Hadii aad ku hadasho Soomaali, waaxda luqadaha, qaybta kaalmada adeegyada, waxay idiin hayaan adeeg kharash la'aan ah. So [facility] [phone].    ATENCIÓN: Si habla espgaviota, tiene a galaviz disposición servicios gratuitos de asistencia lingüística. Llame al [phone].    We comply with applicable federal civil rights laws and [place] laws. We do not discriminate on the basis of race, color, national origin, age, disability, sex, sexual orientation, or gender identity.            Thank you!     Thank you for choosing [facility]  for your care. Our goal is always to provide you with excellent care. Hearing back from our patients is one way we can continue to improve our services. Please take a few minutes to complete the written survey that you may receive in the mail after your visit with us. Thank you!             Your Updated Medication List - Protect others around you: Learn how to safely use, store and throw away your medicines at www.disposemymeds.org.          This list is accurate as of: 1/15/18  1:26 PM.  Always use your most recent med list.                   Brand Name Dispense Instructions for use Diagnosis    ASPIRIN PO      Take 325 mg by mouth daily        chlorhexidine 0.12 % solution    PERIDEX     SWISH AND SPIT TWICE DAILY AS DIRECTED        DEPAKOTE PO      Take 500 mg by mouth 2 times daily        MIRTAZAPINE PO      Take 45 mg by mouth At Bedtime        order for DME     2 each    Equipment being ordered: bilateral below the knee LILIBETH stockings    Bilateral leg edema       simvastatin 10 MG tablet    ZOCOR    90 tablet    Take 1 tablet (10 mg) by  mouth At Bedtime    Hyperlipidemia LDL goal <130       TYLENOL PO      Take 1,000 mg by mouth 2 times daily as needed for mild pain or fever

## 2018-01-15 NOTE — PATIENT INSTRUCTIONS
Wound Care Instructions     FOR SUPERFICIAL WOUNDS     [facility] [phone]    [facility] [phone]                       AFTER 24 HOURS YOU SHOULD REMOVE THE BANDAGE AND BEGIN DAILY DRESSING CHANGES AS FOLLOWS:     1) Remove Dressing.     2) Clean and dry the area with tap water using a Q-tip or sterile gauze pad.     3) Apply Vaseline, Aquaphor, Polysporin ointment or Bacitracin ointment over entire wound.  Do NOT use Neosporin ointment.     4) Cover the wound with a band-aid, or a sterile non-stick gauze pad and micropore paper tape      REPEAT THESE INSTRUCTIONS AT LEAST ONCE A DAY UNTIL THE WOUND HAS COMPLETELY HEALED.    It is an old wives tale that a wound heals better when it is exposed to air and allowed to dry out. The wound will heal faster with a better cosmetic result if it is kept moist with ointment and covered with a bandage.    **Do not let the wound dry out.**      Supplies Needed:      *Cotton tipped applicators (Q-tips)    *Polysporin Ointment or Bacitracin Ointment (NOT NEOSPORIN)    *Band-aids or non-stick gauze pads and micropore paper tape.      PATIENT INFORMATION:    During the healing process you will notice a number of changes. All wounds develop a small halo of redness surrounding the wound.  This means healing is occurring. Severe itching with extensive redness usually indicates sensitivity to the ointment or bandage tape used to dress the wound.  You should call our office if this develops.      Swelling  and/or discoloration around your surgical site is common, particularly when performed around the eye.    All wounds normally drain.  The larger the wound the more drainage there will be.  After 7-10 days, you will notice the wound beginning to shrink and new skin will begin to grow.  The wound is healed when you can see skin has formed over the entire area.  A healed wound has a healthy, shiny look to the surface and is red to dark pink in color  to normalize.  Wounds may take approximately 4-6 weeks to heal.  Larger wounds may take 6-8 weeks.  After the wound is healed you may discontinue dressing changes.    You may experience a sensation of tightness as your wound heals. This is normal and will gradually subside.    Your healed wound may be sensitive to temperature changes. This sensitivity improves with time, but if you re having a lot of discomfort, try to avoid temperature extremes.    Patients frequently experience itching after their wound appears to have healed because of the continue healing under the skin.  Plain Vaseline will help relieve the itching.        POSSIBLE COMPLICATIONS    BLEEDIN. Leave the bandage in place.  2. Use tightly rolled up gauze or a cloth to apply direct pressure over the bandage for 30  minutes.  3. Reapply pressure for an additional 30 minutes if necessary  4. Use additional gauze and tape to maintain pressure once the bleeding has stopped.

## 2018-01-18 NOTE — TELEPHONE ENCOUNTER
Spoke with pt and reviewed results. Pt verbalized understanding and appt was made for cryo.  Abimbola RICCI RN BSN PHN  Specialty Clinics

## 2018-01-21 ENCOUNTER — APPOINTMENT (OUTPATIENT)
Dept: GENERAL RADIOLOGY | Facility: CLINIC | Age: 68
End: 2018-01-21
Attending: FAMILY MEDICINE
Payer: MEDICARE

## 2018-01-21 ENCOUNTER — HOSPITAL ENCOUNTER (EMERGENCY)
Facility: CLINIC | Age: 68
Discharge: HOME | End: 2018-01-21
Attending: FAMILY MEDICINE | Admitting: FAMILY MEDICINE
Payer: MEDICARE

## 2018-01-21 VITALS
DIASTOLIC BLOOD PRESSURE: 76 MMHG | TEMPERATURE: 97.9 F | SYSTOLIC BLOOD PRESSURE: 144 MMHG | OXYGEN SATURATION: 99 % | RESPIRATION RATE: 16 BRPM

## 2018-01-21 DIAGNOSIS — J20.9 ACUTE BRONCHITIS, UNSPECIFIED ORGANISM: ICD-10-CM

## 2018-01-21 DIAGNOSIS — J90 PLEURAL EFFUSION: ICD-10-CM

## 2018-01-21 LAB
FLUAV+FLUBV AG SPEC QL: NEGATIVE
FLUAV+FLUBV AG SPEC QL: NEGATIVE
SPECIMEN SOURCE: NORMAL

## 2018-01-21 PROCEDURE — 99284 EMERGENCY DEPT VISIT MOD MDM: CPT | Mod: 25 | Performed by: FAMILY MEDICINE

## 2018-01-21 PROCEDURE — 87804 INFLUENZA ASSAY W/OPTIC: CPT | Performed by: FAMILY MEDICINE

## 2018-01-21 PROCEDURE — 71046 X-RAY EXAM CHEST 2 VIEWS: CPT

## 2018-01-21 PROCEDURE — 99284 EMERGENCY DEPT VISIT MOD MDM: CPT | Mod: Z6 | Performed by: FAMILY MEDICINE

## 2018-01-21 RX ORDER — CYCLOBENZAPRINE HCL 10 MG
10 TABLET ORAL 3 TIMES DAILY PRN
Qty: 10 TABLET | Refills: 0 | Status: SHIPPED | OUTPATIENT
Start: 2018-01-21 | End: 2018-01-25
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
## 2018-01-21 NOTE — DISCHARGE INSTRUCTIONS
Return to the Emergency Room if the following occurs:     Worsened pain, worsened breathing, dehydration, or for any concern at anytime.    Or, follow-up with the following provider as we discussed:     Return to your primary doctor tomorrow, as scheduled.    Medications discussed:    Flexeril for the muscular pain in your back, as needed.    If you received pain-relieving or sedating medication during your time in the ER, avoid alcohol, driving automobiles, or working with machinery.  Also, a responsible adult must stay with you.        Call the Nurse Advice Line at [phone] or [phone] for any concern at anytime.

## 2018-01-21 NOTE — ED AVS SNAPSHOT
[facility] Emergency Department    [address] [address]    Phone:  [phone]    Fax:  [phone]                                       Nora Alarcon   MRN: [id number]    Department:  [facility] Emergency Department   Date of Visit:  1/21/2018           Patient Information     Date Of Birth          1950        Your diagnoses for this visit were:     Acute bronchitis, unspecified organism     Pleural effusion chronic       You were seen by Mariusz Bennett MD.        Discharge Instructions       Return to the Emergency Room if the following occurs:     Worsened pain, worsened breathing, dehydration, or for any concern at anytime.    Or, follow-up with the following provider as we discussed:     Return to your primary doctor tomorrow, as scheduled.    Medications discussed:    Flexeril for the muscular pain in your back, as needed.    If you received pain-relieving or sedating medication during your time in the ER, avoid alcohol, driving automobiles, or working with machinery.  Also, a responsible adult must stay with you.        Call the Nurse Advice Line at [phone] or [phone] for any concern at anytime.      Future Appointments        Provider Department Dept Phone Center    1/22/2018 1:40 PM JESUS Welsh CNP [facility] [phone] [facility]    1/23/2018 11:30 AM Jumana Olson [facility] [phone] [facility]    1/29/2018 1:45 PM Trinh Anaya PA-C [facility] [phone] [facility]      24 Hour Appointment Hotline       To make an appointment at any [facility], call [phone] ([phone]). If you don't have a family doctor or clinic, we will help you find one. [facility] are conveniently located to serve the needs of you and your family.             Review of your medicines      START taking        Dose / Directions Last dose taken    cyclobenzaprine 10 MG tablet   Commonly known as:   FLEXERIL   Dose:  10 mg   Quantity:  10 tablet        Take 1 tablet (10 mg) by mouth 3 times daily as needed for muscle spasms   Refills:  0          Our records show that you are taking the medicines listed below. If these are incorrect, please call your family doctor or clinic.        Dose / Directions Last dose taken    ASPIRIN PO   Dose:  325 mg        Take 325 mg by mouth daily   Refills:  0        chlorhexidine 0.12 % solution   Commonly known as:  PERIDEX        SWISH AND SPIT TWICE DAILY AS DIRECTED   Refills:  99        DEPAKOTE PO   Dose:  500 mg        Take 500 mg by mouth 2 times daily   Refills:  0        MIRTAZAPINE PO   Dose:  45 mg        Take 45 mg by mouth At Bedtime   Refills:  0        order for DME   Quantity:  2 each        Equipment being ordered: bilateral below the knee LILIBETH stockings   Refills:  2        simvastatin 10 MG tablet   Commonly known as:  ZOCOR   Dose:  10 mg   Quantity:  90 tablet        Take 1 tablet (10 mg) by mouth At Bedtime   Refills:  3        TYLENOL PO   Dose:  1000 mg        Take 1,000 mg by mouth 2 times daily as needed for mild pain or fever   Refills:  0                Prescriptions were sent or printed at these locations (1 Prescription)                   [facility] [place], [place] - [address]   [address] [address]    Telephone:  [phone]   Fax:  [phone]   Hours:                  E-Prescribed (1 of 1)         cyclobenzaprine (FLEXERIL) 10 MG tablet                Procedures and tests performed during your visit     Influenza A/B antigen    XR Chest 2 Views      Orders Needing Specimen Collection     None      Pending Results     Date and Time Order Name Status Description    1/21/2018 1251 XR Chest 2 Views Preliminary             Pending Culture Results     No orders found from 1/19/2018 to 1/22/2018.            Pending Results Instructions     If you had any lab results that were not finalized at the time of your  "Discharge, you can call the ED Lab Result RN at [phone]. You will be contacted by this team for any positive Lab results or changes in treatment. The nurses are available 7 days a week from 10A to 6:30P.  You can leave a message 24 hours per day and they will return your call.        Test Results From Your Hospital Stay        1/21/2018  1:47 PM      Narrative     CHEST TWO VIEWS  1/21/2018 1:38 PM    HISTORY:  Cough.     COMPARISON:  9/9/2015        Impression     IMPRESSION:  Hyperinflation. Chronic right pleural effusion with  associated atelectasis or infiltrate, less prominent than on the prior  exam. Upper lobe opacities seen on the lateral view, unchanged from  the prior lateral chest radiograph of 9/8/2015 and likely chronic  infiltrate or scarring in likely right upper lobe. No new dense areas  of consolidation to suggest acute pneumonia.          1/21/2018  1:46 PM      Component Results     Component Value Ref Range & Units Status    Influenza A/B Agn Specimen Nasal  Final    Influenza A Negative NEG^Negative Final    Influenza B Negative NEG^Negative Final    Test results must be correlated with clinical data. If necessary, results   should be confirmed by a molecular assay or viral culture.                  Thank you for choosing [facility]       Thank you for choosing [facility] for your care. Our goal is always to provide you with excellent care. Hearing back from our patients is one way we can continue to improve our services. Please take a few minutes to complete the written survey that you may receive in the mail after you visit with us. Thank you!        [employer] Information     [employer] lets you send messages to your doctor, view your test results, renew your prescriptions, schedule appointments and more. To sign up, go to [email or web address] . Click on \"Log in\" on the left side of the screen, which will take you to the Welcome page. Then click on \"Sign up Now\" on the right side of the page. "     You will be asked to enter the access code listed below, as well as some personal information. Please follow the directions to create your username and password.     Your access code is: [email or web address]  Expires: 3/11/2018 12:41 PM     Your access code will  in 90 days. If you need help or a new code, please call your [place] clinic or [phone].        Care EveryWhere ID     This is your Care EveryWhere ID. This could be used by other organizations to access your [place] medical records  [id number]        Equal Access to Services     [facility]: Ant Boucher, zenaida muir, shaye black, hang [place] . So [facility] [phone].    ATENCIÓN: Si habla español, tiene a galaviz disposición servicios gratuitos de asistencia lingüística. Herberthame al [phone].    We comply with applicable federal civil rights laws and [place] laws. We do not discriminate on the basis of race, color, national origin, age, disability, sex, sexual orientation, or gender identity.            After Visit Summary       This is your record. Keep this with you and show to your community pharmacist(s) and doctor(s) at your next visit.

## 2018-01-21 NOTE — ED AVS SNAPSHOT
[facility] Emergency Department    [address] [address]    Phone:  [phone]    Fax:  [phone]                                       Nora Alarcon   MRN: [id number]    Department:  [facility] Emergency Department   Date of Visit:  1/21/2018           After Visit Summary Signature Page     I have received my discharge instructions, and my questions have been answered. I have discussed any challenges I see with this plan with the nurse or doctor.    ..........................................................................................................................................  Patient/Patient Representative Signature      ..........................................................................................................................................  Patient Representative Print Name and Relationship to Patient    ..................................................               ................................................  Date                                            Time    ..........................................................................................................................................  Reviewed by Signature/Title    ...................................................              ..............................................  Date                                                            Time

## 2018-01-22 ENCOUNTER — OFFICE VISIT (OUTPATIENT)
Dept: FAMILY MEDICINE | Facility: CLINIC | Age: 68
End: 2018-01-22
Payer: COMMERCIAL

## 2018-01-22 VITALS
OXYGEN SATURATION: 96 % | BODY MASS INDEX: 21.93 KG/M2 | DIASTOLIC BLOOD PRESSURE: 78 MMHG | HEART RATE: 100 BPM | TEMPERATURE: 97.7 F | SYSTOLIC BLOOD PRESSURE: 142 MMHG | WEIGHT: 140 LBS

## 2018-01-22 DIAGNOSIS — J40 BRONCHITIS: Primary | ICD-10-CM

## 2018-01-22 PROCEDURE — 99213 OFFICE O/P EST LOW 20 MIN: CPT | Performed by: NURSE PRACTITIONER

## 2018-01-22 RX ORDER — AZITHROMYCIN 250 MG/1
TABLET, FILM COATED ORAL
Qty: 6 TABLET | Refills: 0 | Status: SHIPPED | OUTPATIENT
Start: 2018-01-22 | End: 2018-01-29

## 2018-01-22 RX ORDER — BENZONATATE 200 MG/1
200 CAPSULE ORAL 3 TIMES DAILY PRN
Qty: 21 CAPSULE | Refills: 0 | Status: SHIPPED | OUTPATIENT
Start: 2018-01-22 | End: 2018-01-29
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
## 2018-01-22 NOTE — PATIENT INSTRUCTIONS
1. Take antibiotics for 5 days  2. Use cough tablets three times a day as needed          Thank you for choosing [facility].  You may be receiving a survey in the mail from Bianca Kohli regarding your visit today.  Please take a few minutes to complete and return the survey to let us know how we are doing.      If you have questions or concerns, please contact us via [employer] or you can contact your care team at [phone].    Our Clinic hours are:  Monday 6:40 am  to 7:00 pm  Tuesday -Friday 6:40 am to 5:00 pm    The [place] outpatient lab hours are:  Monday - Friday 6:10 am to 4:45 pm  Saturdays 7:00 am to 11:00 am  Appointments are required, call [phone]    If you have clinical questions after hours or would like to schedule an appointment,  call the clinic at [phone].

## 2018-01-22 NOTE — NURSING NOTE
"Initial BP (!) 158/104 (BP Location: Left arm, Patient Position: Chair, Cuff Size: Adult Regular)  Pulse 100  Temp 97.7  F (36.5  C) (Tympanic)  Wt 140 lb (63.5 kg)  SpO2 96%  BMI 21.93 kg/m2 Estimated body mass index is 21.93 kg/(m^2) as calculated from the following:    Height as of 1/15/18: 5' 7\" (1.702 m).    Weight as of this encounter: 140 lb (63.5 kg). .    Cande Pepe    "

## 2018-01-22 NOTE — MR AVS SNAPSHOT
After Visit Summary   1/22/2018    Nora Alarcon    MRN: [id number]           Patient Information     Date Of Birth          1950        Visit Information        Provider Department      1/22/2018 1:40 PM Brandie Chris APRN CNP [facility]        Today's Diagnoses     Bronchitis    -  1      Care Instructions    1. Take antibiotics for 5 days  2. Use cough tablets three times a day as needed          Thank you for choosing [facility].  You may be receiving a survey in the mail from [facility] regarding your visit today.  Please take a few minutes to complete and return the survey to let us know how we are doing.      If you have questions or concerns, please contact us via [employer] or you can contact your care team at [phone].    Our Clinic hours are:  Monday 6:40 am  to 7:00 pm  Tuesday -Friday 6:40 am to 5:00 pm    The [place] outpatient lab hours are:  Monday - Friday 6:10 am to 4:45 pm  Saturdays 7:00 am to 11:00 am  Appointments are required, call [phone]    If you have clinical questions after hours or would like to schedule an appointment,  call the clinic at [phone].          Follow-ups after your visit        Your next 10 appointments already scheduled     Jan 23, 2018 11:30 AM CST   [facility] Visit with Jumana Story   [facility]    [address] [id number] [phone]            Jan 29, 2018  1:45 PM CST   Return Visit with Trinh Hi PA-C   [facility]    [address] [address] [phone]              Who to contact     If you have questions or need follow up information about today's clinic visit or your schedule please contact [facility] directly at [phone].  Normal or non-critical lab and imaging results will be communicated to you by MyChart, letter or phone within 4 business days  "after the clinic has received the results. If you do not hear from us within 7 days, please contact the clinic through [employer] or phone. If you have a critical or abnormal lab result, we will notify you by phone as soon as possible.  Submit refill requests through [employer] or call your pharmacy and they will forward the refill request to us. Please allow 3 business days for your refill to be completed.          Additional Information About Your Visit        [employer] Information     [employer] lets you send messages to your doctor, view your test results, renew your prescriptions, schedule appointments and more. To sign up, go to [email or web address] . Click on \"Log in\" on the left side of the screen, which will take you to the Welcome page. Then click on \"Sign up Now\" on the right side of the page.     You will be asked to enter the access code listed below, as well as some personal information. Please follow the directions to create your username and password.     Your access code is: [email or web address]  Expires: 3/11/2018 12:41 PM     Your access code will  in 90 days. If you need help or a new code, please call your [place] clinic or [phone].        Care EveryWhere ID     This is your Care EveryWhere ID. This could be used by other organizations to access your [place] medical records  [id number]        Your Vitals Were     Pulse Temperature Pulse Oximetry BMI (Body Mass Index)          100 97.7  F (36.5  C) (Tympanic) 96% 21.93 kg/m2         Blood Pressure from Last 3 Encounters:   18 (!) 158/104   18 144/76   01/15/18 (!) 156/96    Weight from Last 3 Encounters:   18 140 lb (63.5 kg)   17 140 lb (63.5 kg)   17 138 lb (62.6 kg)              Today, you had the following     No orders found for display         Today's Medication Changes          These changes are accurate as of: 18  1:56 PM.  If you have any questions, ask your nurse or doctor.               Start taking " these medicines.        Dose/Directions    azithromycin 250 MG tablet   Commonly known as:  ZITHROMAX   Used for:  Bronchitis        Two tablets first day, then one tablet daily for four days.   Quantity:  6 tablet   Refills:  0       benzonatate 200 MG capsule   Commonly known as:  TESSALON   Used for:  Bronchitis        Dose:  200 mg   Take 1 capsule (200 mg) by mouth 3 times daily as needed for cough   Quantity:  21 capsule   Refills:  0            Where to get your medicines      These medications were sent to [facility] [address]  [address] [address]     Phone:  [phone]     azithromycin 250 MG tablet    benzonatate 200 MG capsule                Primary Care Provider Office Phone # Fax #    Brandie Hill,  [phone] [address] [address]        Equal Access to Services     BONNIE BUTLER : Ant limao [place], waaxda luqadaha, qaybta kaalmada adeelie, hang [place] . So [facility] [phone].    ATENCIÓN: Si habla español, tiene a galaviz disposición servicios gratuitos de asistencia lingüística. Vamsi al [phone].    We comply with applicable federal civil rights laws and [place] laws. We do not discriminate on the basis of race, color, national origin, age, disability, sex, sexual orientation, or gender identity.            Thank you!     Thank you for choosing [facility]  for your care. Our goal is always to provide you with excellent care. Hearing back from our patients is one way we can continue to improve our services. Please take a few minutes to complete the written survey that you may receive in the mail after your visit with us. Thank you!             Your Updated Medication List - Protect others around you: Learn how to safely use, store and throw away your medicines at www.disposemymeds.org.          This list is accurate as of: 1/22/18  1:56 PM.   Always use your most recent med list.                   Brand Name Dispense Instructions for use Diagnosis    ASPIRIN PO      Take 325 mg by mouth daily        azithromycin 250 MG tablet    ZITHROMAX    6 tablet    Two tablets first day, then one tablet daily for four days.    Bronchitis       benzonatate 200 MG capsule    TESSALON    21 capsule    Take 1 capsule (200 mg) by mouth 3 times daily as needed for cough    Bronchitis       chlorhexidine 0.12 % solution    PERIDEX     SWISH AND SPIT TWICE DAILY AS DIRECTED        cyclobenzaprine 10 MG tablet    FLEXERIL    10 tablet    Take 1 tablet (10 mg) by mouth 3 times daily as needed for muscle spasms        DEPAKOTE PO      Take 500 mg by mouth 2 times daily        MIRTAZAPINE PO      Take 45 mg by mouth At Bedtime        order for DME     2 each    Equipment being ordered: bilateral below the knee LILIBETH stockings    Bilateral leg edema       simvastatin 10 MG tablet    ZOCOR    90 tablet    Take 1 tablet (10 mg) by mouth At Bedtime    Hyperlipidemia LDL goal <130       TYLENOL PO      Take 1,000 mg by mouth 2 times daily as needed for mild pain or fever

## 2018-01-25 ENCOUNTER — TELEPHONE (OUTPATIENT)
Dept: FAMILY MEDICINE | Facility: CLINIC | Age: 68
End: 2018-01-25

## 2018-01-25 DIAGNOSIS — M54.50 ACUTE BILATERAL LOW BACK PAIN WITHOUT SCIATICA: Primary | ICD-10-CM

## 2018-01-25 RX ORDER — CYCLOBENZAPRINE HCL 10 MG
10 TABLET ORAL 3 TIMES DAILY PRN
Qty: 10 TABLET | Refills: 0 | Status: SHIPPED | OUTPATIENT
Start: 2018-01-25 | End: 2018-04-24

## 2018-01-25 NOTE — TELEPHONE ENCOUNTER
Reason for Call:  Other prescription    Detailed comments: pt calling stating she is still having pain with coughing. She is wondering if she can get a prescription of Flexeril. She stated that really helped.    Phone Number Patient can be reached at: Home number on file [phone] (home)    Best Time: any    Can we leave a detailed message on this number? YES    Call taken on 1/25/2018 at 9:33 AM by Susi Mota

## 2018-01-26 ENCOUNTER — NURSE TRIAGE (OUTPATIENT)
Dept: NURSING | Facility: CLINIC | Age: 68
End: 2018-01-26

## 2018-01-27 ENCOUNTER — APPOINTMENT (OUTPATIENT)
Dept: CT IMAGING | Facility: CLINIC | Age: 68
End: 2018-01-27
Attending: EMERGENCY MEDICINE
Payer: MEDICARE

## 2018-01-27 ENCOUNTER — HOSPITAL ENCOUNTER (EMERGENCY)
Facility: CLINIC | Age: 68
Discharge: HOME | End: 2018-01-27
Attending: EMERGENCY MEDICINE | Admitting: EMERGENCY MEDICINE
Payer: MEDICARE

## 2018-01-27 VITALS
BODY MASS INDEX: 21.93 KG/M2 | WEIGHT: 140 LBS | OXYGEN SATURATION: 99 % | HEART RATE: 83 BPM | SYSTOLIC BLOOD PRESSURE: 149 MMHG | RESPIRATION RATE: 14 BRPM | TEMPERATURE: 98.1 F | DIASTOLIC BLOOD PRESSURE: 77 MMHG

## 2018-01-27 DIAGNOSIS — S32.050A CLOSED COMPRESSION FRACTURE OF FIFTH LUMBAR VERTEBRA, INITIAL ENCOUNTER: ICD-10-CM

## 2018-01-27 DIAGNOSIS — M81.0 AGE-RELATED OSTEOPOROSIS WITHOUT CURRENT PATHOLOGICAL FRACTURE: ICD-10-CM
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
## 2018-01-27 DIAGNOSIS — M54.42 ACUTE LEFT-SIDED LOW BACK PAIN WITH LEFT-SIDED SCIATICA: ICD-10-CM

## 2018-01-27 PROCEDURE — 99284 EMERGENCY DEPT VISIT MOD MDM: CPT | Mod: Z6 | Performed by: EMERGENCY MEDICINE

## 2018-01-27 PROCEDURE — 72132 CT LUMBAR SPINE W/DYE: CPT

## 2018-01-27 PROCEDURE — 72192 CT PELVIS W/O DYE: CPT

## 2018-01-27 PROCEDURE — 99285 EMERGENCY DEPT VISIT HI MDM: CPT | Mod: 25 | Performed by: EMERGENCY MEDICINE

## 2018-01-27 PROCEDURE — 72131 CT LUMBAR SPINE W/O DYE: CPT

## 2018-01-27 RX ORDER — HYDROCODONE BITARTRATE AND ACETAMINOPHEN 5; 325 MG/1; MG/1
1 TABLET ORAL EVERY 4 HOURS PRN
Qty: 20 TABLET | Refills: 0 | Status: SHIPPED | OUTPATIENT
Start: 2018-01-27 | End: 2018-02-05

## 2018-01-27 ASSESSMENT — ENCOUNTER SYMPTOMS
BACK PAIN: 1
WEAKNESS: 1

## 2018-01-27 NOTE — DISCHARGE INSTRUCTIONS
CT scan demonstrated acute compression fracture to the fifth lumbar vertebrae.  This is secondary to your fall.  You also have underlying osteoporosis which further weakens the bone.  Fortunately there is no injury to the spinal cord.  This is a stable fracture that typically does not require surgery.  I have referred you to the orthopedic spine specialty clinic at [facility].  They need to monitor your pain.  There is a potential opportunity where they would consider doing a spinal glue injection that can help promote vertebral body fracture healing and reduce pain.   Hydrocodone as directed for severe pain  Must use your walker to reduce risk for falls.    Results for orders placed or performed during the hospital encounter of 01/27/18 (from the past 24 hour(s))   CT Pelvis w/o Contrast    Narrative    CT PELVIS W/O CONTRAST 1/27/2018 10:48 AM    TECHNIQUE: Volumetric acquisition of CT images  through the pelvis.  Radiation dose for this scan was reduced using automated exposure  control, adjustment of the mA and/or KV according to patient size, or  iterative reconstruction technique.    COMPARISON: None.    HISTORY:  Back and pelvic pain     FINDINGS: Demineralized bones. Prominent degenerative facet disease  lower lumbar spine. Exam otherwise unremarkable.      Impression    IMPRESSION: Demineralized bones. Prominent degenerative facet disease  lower lumbar spine. Exam otherwise unremarkable. If symptoms persist a  short term followup exam or additional imaging may be helpful if  clinically indicated.    LUCI SANTOS MD   CT Lumbar Spine w/o Contrast    Narrative    CT LUMBAR SPINE WITHOUT CONTRAST  1/27/2018 10:49 AM     HISTORY: Fall. Severe low back pain, osteoporosis, previous T12  compression fracture and disc space narrowing L5-S1.      TECHNIQUE: Axial images of the lumbar spine were obtained without  intravenous contrast. Multiplanar reformations were performed.   Radiation dose for this scan was  reduced using automated exposure  control, adjustment of the mA and/or kV according to patient size, or  iterative reconstruction technique.    COMPARISON: CT abdomen and pelvis 9/22/2016.    FINDINGS:  There are five lumbar-type vertebral bodies assumed for the  purposes of this dictation.     Bones appear osteopenic which makes evaluation for fractures  difficult. There is suggestion of a subtle oblique fracture through  the posterior superior aspect of the left L5 vertebral body (series 8  image 33). Mild adjacent sclerosis. The fracture does not appear to  involve the pedicles or lamina. There is mild loss of L5 vertebral  body height.    There are anterior wedge compression deformities of T12 and L1  vertebral bodies that appear unchanged since prior CT 9/22/2016. There  is persistent mild posterior displacement of the superior endplates of  T12 and L1 into the spinal canal resulting in mild spinal canal  narrowing at those levels.    Normal lumbar lordosis. Minimal grade 1 anterolisthesis of L4 on L5.  Moderate loss of intervertebral disc height throughout the lumbar  spine.    Level by level as follows:    T12-L1:  Posterior displacement of the superior endplate causes mild  spinal canal narrowing. Bilateral uncinate spurring and facet  hypertrophy result in moderate right and mild left neural foraminal  narrowing.     L1-L2:  Posterior disc bulge and endplate osteophytic spurring,  asymmetric on the right. No significant spinal canal narrowing.  Associated right greater than left facet hypertrophy results in  moderate right and mild left neural foraminal narrowing.    L2-L3:  No significant spinal canal narrowing. Marked bilateral facet  hypertrophy results in moderate right and mild left neural foraminal  narrowing.     L3-L4:  Mild posterior disc bulge and marked bilateral facet  hypertrophy and ligamentum flavum infolding. No significant spinal  canal narrowing. Mild right and moderate left neural  foraminal  narrowing.    L4-L5:  Grade 1 anterolisthesis along with posterior disc bulge and  ligamentum flavum infolding. No significant central spinal canal  narrowing. No neural foraminal narrowing.     L5-S1:  Posterior and left-sided disc bulge with endplate osteophytic  spurring. No significant spinal canal narrowing. Left-sided facet  hypertrophy. Findings result in moderate to severe left neural  foraminal narrowing. No significant right neural foraminal narrowing.       Visualized paraspinous tissues: Trace right pleural effusion with  dependent bibasilar atelectasis.      Impression    IMPRESSION:    1. Acute-appearing fracture through the posterior superior left aspect  of the L5 vertebral body with mild adjacent sclerosis. The fracture  does not appear to involve the pedicles or lamina. There is mild loss  of L5 vertebral body height.     2. Anterior wedge compression deformities of T12 and L1 that appear  similar to prior CT 9/22/2016.  3. Bones appear markedly osteopenic which limits evaluation for  fractures.  4. Multilevel degenerative changes throughout the lumbar spine as  described above.    EMERSON LYNN MD

## 2018-01-27 NOTE — ED AVS SNAPSHOT
[facility] Emergency Department    [address] [address]    Phone:  [phone]    Fax:  [phone]                                       Nora Alarcon   MRN: [id number]    Department:  [facility] Emergency Department   Date of Visit:  1/27/2018           Patient Information     Date Of Birth          1950        Your diagnoses for this visit were:     Acute left-sided low back pain with left-sided sciatica     Fall in home, initial encounter     Age-related osteoporosis without current pathological fracture     Closed compression fracture of fifth lumbar vertebra, initial encounter (H)        You were seen by Zackery Alarcon DO.        Discharge Instructions       CT scan demonstrated acute compression fracture to the fifth lumbar vertebrae.  This is secondary to your fall.  You also have underlying osteoporosis which further weakens the bone.  Fortunately there is no injury to the spinal cord.  This is a stable fracture that typically does not require surgery.  I have referred you to the orthopedic spine specialty clinic at [facility].  They need to monitor your pain.  There is a potential opportunity where they would consider doing a spinal glue injection that can help promote vertebral body fracture healing and reduce pain.   Hydrocodone as directed for severe pain  Must use your walker to reduce risk for falls.    Results for orders placed or performed during the hospital encounter of 01/27/18 (from the past 24 hour(s))   CT Pelvis w/o Contrast    Narrative    CT PELVIS W/O CONTRAST 1/27/2018 10:48 AM    TECHNIQUE: Volumetric acquisition of CT images  through the pelvis.  Radiation dose for this scan was reduced using automated exposure  control, adjustment of the mA and/or KV according to patient size, or  iterative reconstruction technique.    COMPARISON: None.    HISTORY:  Back and pelvic pain     FINDINGS: Demineralized bones. Prominent degenerative  facet disease  lower lumbar spine. Exam otherwise unremarkable.      Impression    IMPRESSION: Demineralized bones. Prominent degenerative facet disease  lower lumbar spine. Exam otherwise unremarkable. If symptoms persist a  short term followup exam or additional imaging may be helpful if  clinically indicated.    LUCI SANTOS MD   CT Lumbar Spine w/o Contrast    Narrative    CT LUMBAR SPINE WITHOUT CONTRAST  1/27/2018 10:49 AM     HISTORY: Fall. Severe low back pain, osteoporosis, previous T12  compression fracture and disc space narrowing L5-S1.      TECHNIQUE: Axial images of the lumbar spine were obtained without  intravenous contrast. Multiplanar reformations were performed.   Radiation dose for this scan was reduced using automated exposure  control, adjustment of the mA and/or kV according to patient size, or  iterative reconstruction technique.    COMPARISON: CT abdomen and pelvis 9/22/2016.    FINDINGS:  There are five lumbar-type vertebral bodies assumed for the  purposes of this dictation.     Bones appear osteopenic which makes evaluation for fractures  difficult. There is suggestion of a subtle oblique fracture through  the posterior superior aspect of the left L5 vertebral body (series 8  image 33). Mild adjacent sclerosis. The fracture does not appear to  involve the pedicles or lamina. There is mild loss of L5 vertebral  body height.    There are anterior wedge compression deformities of T12 and L1  vertebral bodies that appear unchanged since prior CT 9/22/2016. There  is persistent mild posterior displacement of the superior endplates of  T12 and L1 into the spinal canal resulting in mild spinal canal  narrowing at those levels.    Normal lumbar lordosis. Minimal grade 1 anterolisthesis of L4 on L5.  Moderate loss of intervertebral disc height throughout the lumbar  spine.    Level by level as follows:    T12-L1:  Posterior displacement of the superior endplate causes mild  spinal canal  narrowing. Bilateral uncinate spurring and facet  hypertrophy result in moderate right and mild left neural foraminal  narrowing.     L1-L2:  Posterior disc bulge and endplate osteophytic spurring,  asymmetric on the right. No significant spinal canal narrowing.  Associated right greater than left facet hypertrophy results in  moderate right and mild left neural foraminal narrowing.    L2-L3:  No significant spinal canal narrowing. Marked bilateral facet  hypertrophy results in moderate right and mild left neural foraminal  narrowing.     L3-L4:  Mild posterior disc bulge and marked bilateral facet  hypertrophy and ligamentum flavum infolding. No significant spinal  canal narrowing. Mild right and moderate left neural foraminal  narrowing.    L4-L5:  Grade 1 anterolisthesis along with posterior disc bulge and  ligamentum flavum infolding. No significant central spinal canal  narrowing. No neural foraminal narrowing.     L5-S1:  Posterior and left-sided disc bulge with endplate osteophytic  spurring. No significant spinal canal narrowing. Left-sided facet  hypertrophy. Findings result in moderate to severe left neural  foraminal narrowing. No significant right neural foraminal narrowing.       Visualized paraspinous tissues: Trace right pleural effusion with  dependent bibasilar atelectasis.      Impression    IMPRESSION:    1. Acute-appearing fracture through the posterior superior left aspect  of the L5 vertebral body with mild adjacent sclerosis. The fracture  does not appear to involve the pedicles or lamina. There is mild loss  of L5 vertebral body height.     2. Anterior wedge compression deformities of T12 and L1 that appear  similar to prior CT 9/22/2016.  3. Bones appear markedly osteopenic which limits evaluation for  fractures.  4. Multilevel degenerative changes throughout the lumbar spine as  described above.    EMERSON LYNN MD         Future Appointments        Provider Department Dept [facility]     1/29/2018 1:45 PM Trinh Anaya PA-C [facility] [phone] [facility]    1/29/2018 2:40 PM JESUS Welsh CNP [facility] [phone] FL[facility]    2/19/2018 3:00 PM Jumana Olson [facility] [phone] [facility]      24 Hour Appointment Hotline       To make an appointment at any [facility], call [phone] ([phone]). If you don't have a family doctor or clinic, we will help you find one. [place] clinics are conveniently located to serve the needs of you and your family.          ED Discharge Orders     ORTHO  REFERRAL       [facility] is referring you to the Orthopedic  Services at [facility].       The  Representative will assist you in the coordination of your Orthopedic and Musculoskeletal Care as prescribed by your physician.    The  Representative will call you within 1 business day to help schedule your appointment, or you may contact the  Representative at:    All areas ~ [phone]     Type of Referral : Spine: Cervical / Thoracic: Medical Spine Specialist        Timeframe requested: 3 - 5 days    Coverage of these services is subject to the terms and limitations of your health insurance plan.  Please call member services at your health plan with any benefit or coverage questions.      If X-rays, CT or MRI's have been performed, please contact the facility where they were done to arrange for , prior to your scheduled appointment.  Please bring this referral request to your appointment and present it to your specialist.                     Review of your medicines      START taking        Dose / Directions Last dose taken    HYDROcodone-acetaminophen 5-325 MG per tablet   Commonly known as:  NORCO   Dose:  1 tablet   Quantity:  20 tablet        Take 1 tablet by mouth every 4 hours as needed for moderate to severe pain   Refills:  0          Our  records show that you are taking the medicines listed below. If these are incorrect, please call your family doctor or clinic.        Dose / Directions Last dose taken    ASPIRIN PO   Dose:  325 mg        Take 325 mg by mouth daily   Refills:  0        azithromycin 250 MG tablet   Commonly known as:  ZITHROMAX   Quantity:  6 tablet        Two tablets first day, then one tablet daily for four days.   Refills:  0        benzonatate 200 MG capsule   Commonly known as:  TESSALON   Dose:  200 mg   Quantity:  21 capsule        Take 1 capsule (200 mg) by mouth 3 times daily as needed for cough   Refills:  0        chlorhexidine 0.12 % solution   Commonly known as:  PERIDEX        SWISH AND SPIT TWICE DAILY AS DIRECTED   Refills:  99        cyclobenzaprine 10 MG tablet   Commonly known as:  FLEXERIL   Dose:  10 mg   Quantity:  10 tablet        Take 1 tablet (10 mg) by mouth 3 times daily as needed for muscle spasms   Refills:  0        DEPAKOTE PO   Dose:  500 mg        Take 500 mg by mouth 2 times daily   Refills:  0        MIRTAZAPINE PO   Dose:  45 mg        Take 45 mg by mouth At Bedtime   Refills:  0        order for DME   Quantity:  2 each        Equipment being ordered: bilateral below the knee LILIBETH stockings   Refills:  2        simvastatin 10 MG tablet   Commonly known as:  ZOCOR   Dose:  10 mg   Quantity:  90 tablet        Take 1 tablet (10 mg) by mouth At Bedtime   Refills:  3        TYLENOL PO   Dose:  1000 mg        Take 1,000 mg by mouth 2 times daily as needed for mild pain or fever   Refills:  0                Prescriptions were sent or printed at these locations (1 Prescription)                   Other Prescriptions                Printed at Department/Unit printer (1 of 1)         HYDROcodone-acetaminophen (NORCO) 5-325 MG per tablet                Procedures and tests performed during your visit     CT Lumbar Spine w/o Contrast    CT Pelvis w/o Contrast      Orders Needing Specimen Collection     None       Pending Results     No orders found from 1/25/2018 to 1/28/2018.            Pending Culture Results     No orders found from 1/25/2018 to 1/28/2018.            Pending Results Instructions     If you had any lab results that were not finalized at the time of your Discharge, you can call the ED Lab Result RN at [phone]. You will be contacted by this team for any positive Lab results or changes in treatment. The nurses are available 7 days a week from 10A to 6:30P.  You can leave a message 24 hours per day and they will return your call.        Test Results From Your Hospital Stay              1/27/2018 10:55 AM      Narrative     CT PELVIS W/O CONTRAST 1/27/2018 10:48 AM    TECHNIQUE: Volumetric acquisition of CT images  through the pelvis.  Radiation dose for this scan was reduced using automated exposure  control, adjustment of the mA and/or KV according to patient size, or  iterative reconstruction technique.    COMPARISON: None.    HISTORY:  Back and pelvic pain     FINDINGS: Demineralized bones. Prominent degenerative facet disease  lower lumbar spine. Exam otherwise unremarkable.        Impression     IMPRESSION: Demineralized bones. Prominent degenerative facet disease  lower lumbar spine. Exam otherwise unremarkable. If symptoms persist a  short term followup exam or additional imaging may be helpful if  clinically indicated.    LUCI SANTOS MD         1/27/2018 12:26 PM      Narrative     CT LUMBAR SPINE WITHOUT CONTRAST  1/27/2018 10:49 AM     HISTORY: Fall. Severe low back pain, osteoporosis, previous T12  compression fracture and disc space narrowing L5-S1.      TECHNIQUE: Axial images of the lumbar spine were obtained without  intravenous contrast. Multiplanar reformations were performed.   Radiation dose for this scan was reduced using automated exposure  control, adjustment of the mA and/or kV according to patient size, or  iterative reconstruction technique.    COMPARISON: CT abdomen and  pelvis 9/22/2016.    FINDINGS:  There are five lumbar-type vertebral bodies assumed for the  purposes of this dictation.     Bones appear osteopenic which makes evaluation for fractures  difficult. There is suggestion of a subtle oblique fracture through  the posterior superior aspect of the left L5 vertebral body (series 8  image 33). Mild adjacent sclerosis. The fracture does not appear to  involve the pedicles or lamina. There is mild loss of L5 vertebral  body height.    There are anterior wedge compression deformities of T12 and L1  vertebral bodies that appear unchanged since prior CT 9/22/2016. There  is persistent mild posterior displacement of the superior endplates of  T12 and L1 into the spinal canal resulting in mild spinal canal  narrowing at those levels.    Normal lumbar lordosis. Minimal grade 1 anterolisthesis of L4 on L5.  Moderate loss of intervertebral disc height throughout the lumbar  spine.    Level by level as follows:    T12-L1:  Posterior displacement of the superior endplate causes mild  spinal canal narrowing. Bilateral uncinate spurring and facet  hypertrophy result in moderate right and mild left neural foraminal  narrowing.     L1-L2:  Posterior disc bulge and endplate osteophytic spurring,  asymmetric on the right. No significant spinal canal narrowing.  Associated right greater than left facet hypertrophy results in  moderate right and mild left neural foraminal narrowing.    L2-L3:  No significant spinal canal narrowing. Marked bilateral facet  hypertrophy results in moderate right and mild left neural foraminal  narrowing.     L3-L4:  Mild posterior disc bulge and marked bilateral facet  hypertrophy and ligamentum flavum infolding. No significant spinal  canal narrowing. Mild right and moderate left neural foraminal  narrowing.    L4-L5:  Grade 1 anterolisthesis along with posterior disc bulge and  ligamentum flavum infolding. No significant central spinal canal  narrowing. No  "neural foraminal narrowing.     L5-S1:  Posterior and left-sided disc bulge with endplate osteophytic  spurring. No significant spinal canal narrowing. Left-sided facet  hypertrophy. Findings result in moderate to severe left neural  foraminal narrowing. No significant right neural foraminal narrowing.       Visualized paraspinous tissues: Trace right pleural effusion with  dependent bibasilar atelectasis.        Impression     IMPRESSION:    1. Acute-appearing fracture through the posterior superior left aspect  of the L5 vertebral body with mild adjacent sclerosis. The fracture  does not appear to involve the pedicles or lamina. There is mild loss  of L5 vertebral body height.     2. Anterior wedge compression deformities of T12 and L1 that appear  similar to prior CT 9/22/2016.  3. Bones appear markedly osteopenic which limits evaluation for  fractures.  4. Multilevel degenerative changes throughout the lumbar spine as  described above.    EMERSON LYNN MD                Thank you for choosing [facility]       Thank you for choosing [facility] for your care. Our goal is always to provide you with excellent care. Hearing back from our patients is one way we can continue to improve our services. Please take a few minutes to complete the written survey that you may receive in the mail after you visit with us. Thank you!        [facility] Information     [facility] lets you send messages to your doctor, view your test results, renew your prescriptions, schedule appointments and more. To sign up, go to [email or web address] . Click on \"Log in\" on the left side of the screen, which will take you to the Welcome page. Then click on \"Sign up Now\" on the right side of the page.     You will be asked to enter the access code listed below, as well as some personal information. Please follow the directions to create your username and password.     Your access code is: [email or web address]  Expires: 3/11/2018 12:41 PM     Your access code " will  in 90 days. If you need help or a new code, please call your [place] clinic or [phone].        Care EveryWhere ID     This is your Care EveryWhere ID. This could be used by other organizations to access your [place] medical records  [id number]        Equal Access to Services     BONNIE BUTLER : Ant Boucher, waaxda luqadaha, qaybta kaalmada sofia, hang chaudhary. So [facility] [phone].    ATENCIÓN: Si habla español, tiene a galaviz disposición servicios gratuitos de asistencia lingüística. Llame al [phone].    We comply with applicable federal civil rights laws and [place] laws. We do not discriminate on the basis of race, color, national origin, age, disability, sex, sexual orientation, or gender identity.            After Visit Summary       This is your record. Keep this with you and show to your community pharmacist(s) and doctor(s) at your next visit.

## 2018-01-27 NOTE — ED AVS SNAPSHOT
[facility] [place] [facility]    [address] [address]    Phone:  [phone]    Fax:  [phone]                                       Nora Alarcon   MRN: [id number]    Department:  [facility]   Date of Visit:  1/27/2018           After Visit Summary Signature Page     I have received my discharge instructions, and my questions have been answered. I have discussed any challenges I see with this plan with the nurse or doctor.    ..........................................................................................................................................  Patient/Patient Representative Signature      ..........................................................................................................................................  Patient Representative Print Name and Relationship to Patient    ..................................................               ................................................  Date                                            Time    ..........................................................................................................................................  Reviewed by Signature/Title    ...................................................              ..............................................  Date                                                            Time

## 2018-01-27 NOTE — ED NOTES
Pt d/c instructions reviewed and received. There are no unanswered questions at the time of discharge. Pt escorted to retail pharmacy.

## 2018-01-27 NOTE — TELEPHONE ENCOUNTER
"Says she called clinic earlier. No note on this. C/o pain in lower back going down posterior of buttock, thigh and calf. Started 2 days ago. Says only injury is she fell 1 week ago. Denies numbness or weakness of leg. Able to walk but it hurts. No change in bladder/bowel control. Rates pain 8 out of 10. Took Flexaril and Tylenol 1 hour ago which helped \"a little\". States her doctor has told her do not take NSAIDs.Advised see provider within 4 hours. Pt voiced understanding. Pt said she cannot go anywhere tonight. Says she will go to  tomorrow. .  Saritha Hopper RN/FNA    Reason for Disposition    [1] SEVERE back pain (e.g., excruciating, unable to do any normal activities) AND [2] not improved 2 hours after pain medicine    Additional Information    Negative: Looks like a broken bone or dislocated joint (e.g., crooked or deformed)    Negative: Sounds like a life-threatening emergency to the triager    Negative: Followed a leg injury    Negative: Leg swelling is main symptom    Back pain radiating (shooting) into leg(s)    Negative: Passed out (i.e., lost consciousness, collapsed and was not responding)    Negative: Shock suspected (e.g., cold/pale/clammy skin, too weak to stand, low BP, rapid pulse)    Negative: Sounds like a life-threatening emergency to the triager    Negative: Major injury to the back (e.g., MVA, fall > 10 feet or 3 meters, penetrating injury, etc.)    Negative: Followed a tailbone injury    Negative: [1] Pain in the upper back over the ribs (rib cage) AND [2] radiates (travels, goes) into chest    Negative: [1] Pain in the upper back over the ribs (rib cage) AND [2] worsened by coughing (or clearly increases with breathing)    Negative: Back pain during pregnancy    Negative: Pain mainly in flank (i.e., in the side, over the lower ribs or just below the ribs)    Negative: [1] SEVERE back pain (e.g., excruciating) AND [2] sudden onset AND [3] age > 60    Negative: [1] Unable to urinate (or only " a few drops) > 4 hours AND     [2] bladder feels very full (e.g., palpable bladder or strong urge to urinate)    Negative: [1] Urinary or bowel incontinence (i.e., loss of bladder or bowel control) AND [2] new onset    Negative: Numbness in groin or rectal area (i.e., loss of sensation)    Negative: [1] SEVERE abdominal pain AND [2] present > 1 hour    Negative: [1] Abdominal pain AND [2] age > 60    Negative: Weakness of a leg or foot (e.g., unable to bear weight, dragging foot)    Negative: Unable to walk    Negative: Patient sounds very sick or weak to the triager    Protocols used: BACK PAIN-ADULT-AH, LEG PAIN-ADULT-AH
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
## 2018-01-29 ENCOUNTER — OFFICE VISIT (OUTPATIENT)
Dept: DERMATOLOGY | Facility: CLINIC | Age: 68
End: 2018-01-29
Payer: COMMERCIAL

## 2018-01-29 ENCOUNTER — OFFICE VISIT (OUTPATIENT)
Dept: FAMILY MEDICINE | Facility: CLINIC | Age: 68
End: 2018-01-29
Payer: COMMERCIAL

## 2018-01-29 VITALS
TEMPERATURE: 98.7 F | BODY MASS INDEX: 21.77 KG/M2 | HEART RATE: 90 BPM | DIASTOLIC BLOOD PRESSURE: 78 MMHG | WEIGHT: 139 LBS | SYSTOLIC BLOOD PRESSURE: 130 MMHG | OXYGEN SATURATION: 94 %

## 2018-01-29 DIAGNOSIS — L57.0 AK (ACTINIC KERATOSIS): Primary | ICD-10-CM

## 2018-01-29 DIAGNOSIS — M81.0 OSTEOPOROSIS, UNSPECIFIED OSTEOPOROSIS TYPE, UNSPECIFIED PATHOLOGICAL FRACTURE PRESENCE: ICD-10-CM

## 2018-01-29 DIAGNOSIS — S32.059A CLOSED FRACTURE OF FIFTH LUMBAR VERTEBRA, UNSPECIFIED FRACTURE MORPHOLOGY, INITIAL ENCOUNTER (H): Primary | ICD-10-CM

## 2018-01-29 PROCEDURE — 17003 DESTRUCT PREMALG LES 2-14: CPT | Performed by: PHYSICIAN ASSISTANT

## 2018-01-29 PROCEDURE — 17000 DESTRUCT PREMALG LESION: CPT | Performed by: PHYSICIAN ASSISTANT

## 2018-01-29 PROCEDURE — 99213 OFFICE O/P EST LOW 20 MIN: CPT | Performed by: NURSE PRACTITIONER
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
## 2018-01-29 NOTE — LETTER
1/29/2018         RE: Nora Alarcon  [id number] [address] [facility] [id number]        Dear Colleague,    Thank you for referring your patient, Nora Alarcon, to the [facility]. Please see a copy of my visit note below.    HPI:   Nora Alarcon is a 67 year old female who presents for treatment of biopsy proven AKs on the left forehead  chief complaint  Location: left forehead       Review Of Systems  Eyes: negative  Ears/Nose/Throat: negative  Respiratory: No shortness of breath, dyspnea on exertion, cough, or hemoptysis  Cardiovascular: negative  Gastrointestinal: negative  Genitourinary: negative  Musculoskeletal: negative  Neurologic: negative  Psychiatric: negative        PHYSICAL EXAM:      Skin exam performed as follows: Type 2 skin. Mood appropriate  Alert and Oriented X 3. Well developed, well nourished in no distress.  General appearance: Normal  Head including face: Normal  Eyes: conjunctiva and lids: Normal  Mouth: Lips, teeth, gums: Normal  Neck: Normal  Chest-breast/axillae: Normal  Back: Normal  Spleen and liver: Normal  Cardiovascular: Exam of peripheral vascular system by observation for swelling, varicosities, edema: Normal  Genitalia: groin, buttocks: Normal  Extremities: digits/nails (clubbing): Normal  Eccrine and Apocrine glands: Normal  Right upper extremity: Normal  Left upper extremity: Normal  Right lower extremity: Normal  Left lower extremity: Normal  Skin: Scalp and body hair: See below    1. Pink gritty papules on left forehead x 2    ASSESSMENT/PLAN:     1. Biopsy proven Actinic keratosis on the left forehead x 2. As precancerous, cryosurgery performed. Advised on blistering and post-op care. Advised if not resolved in 1-2 months to return for evaluation\        Follow-up: yearly FSE/PRN sooner  CC:   Scribed By: Trinh Hi, MS, PA-C      Again, thank you for allowing me to participate in the care of your patient.        Sincerely,        Trinh MALONE  KACEY Anaya

## 2018-01-29 NOTE — PROGRESS NOTES
HPI:   Nora Alarcon is a 67 year old female who presents for treatment of biopsy proven AKs on the left forehead  chief complaint  Location: left forehead       Review Of Systems  Eyes: negative  Ears/Nose/Throat: negative  Respiratory: No shortness of breath, dyspnea on exertion, cough, or hemoptysis  Cardiovascular: negative  Gastrointestinal: negative  Genitourinary: negative  Musculoskeletal: negative  Neurologic: negative  Psychiatric: negative        PHYSICAL EXAM:      Skin exam performed as follows: Type 2 skin. Mood appropriate  Alert and Oriented X 3. Well developed, well nourished in no distress.  General appearance: Normal  Head including face: Normal  Eyes: conjunctiva and lids: Normal  Mouth: Lips, teeth, gums: Normal  Neck: Normal  Chest-breast/axillae: Normal  Back: Normal  Spleen and liver: Normal  Cardiovascular: Exam of peripheral vascular system by observation for swelling, varicosities, edema: Normal  Genitalia: groin, buttocks: Normal  Extremities: digits/nails (clubbing): Normal  Eccrine and Apocrine glands: Normal  Right upper extremity: Normal  Left upper extremity: Normal  Right lower extremity: Normal  Left lower extremity: Normal  Skin: Scalp and body hair: See below    1. Pink gritty papules on left forehead x 2    ASSESSMENT/PLAN:     1. Biopsy proven Actinic keratosis on the left forehead x 2. As precancerous, cryosurgery performed. Advised on blistering and post-op care. Advised if not resolved in 1-2 months to return for evaluation\        Follow-up: yearly FSE/PRN sooner  CC:   Scribed By: Trinh Hi, MS, PA-C

## 2018-01-29 NOTE — MR AVS SNAPSHOT
After Visit Summary   1/29/2018    Nora Alarcon    MRN: [id number]           Patient Information     Date Of Birth          1950        Visit Information        Provider Department      1/29/2018 2:40 PM Brandie Chris APRN [facility]        Today's Diagnoses     Closed fracture of fifth lumbar vertebra, unspecified fracture morphology, initial encounter (H)    -  1      Care Instructions    1. Schedule an appointment with spine specialist           Thank you for choosing [facility].  You may be receiving a survey in the mail from Bianca Kohli regarding your visit today.  Please take a few minutes to complete and return the survey to let us know how we are doing.      If you have questions or concerns, please contact us via [employer] D[employer]le or you can contact your care team at [phone].    Our Clinic hours are:  Monday 6:40 am  to 7:00 pm  Tuesday -Friday 6:40 am to 5:00 pm    The [place] outpatient lab hours are:  Monday - Friday 6:10 am to 4:45 pm  Saturdays 7:00 am to 11:00 am  Appointments are required, call [phone]    If you have clinical questions after hours or would like to schedule an appointment,  call the clinic at [phone].          Follow-ups after your visit        Additional Services     ORTHO  REFERRAL       [facility] is referring you to the Orthopedic  Services at [facility].       The  Representative will assist you in the coordination of your Orthopedic and Musculoskeletal Care as prescribed by your physician.    The  Representative will call you within 1 business day to help schedule your appointment, or you may contact the  Representative at:    All areas ~ [phone]     Type of Referral : Spine: Lumbar  **Choose Medical Spine Specialist (unless patient was seen by a Medical Spine Specialist within the past 6 months).**  Surgical Evaluation is advised if  the patient presents with one or more of the following red flags: Evidence of Spinal Tumor, Infection or Fracture, Cauda Equina Syndrome, Sudden or Progressive Weakness, Loss of Bowel or Bladder Control, or any other documented emergent neurological condition resulting from a Lumbar Spinal Condition. Medical Spine Specialist        Timeframe requested: 1 - 2 days    Coverage of these services is subject to the terms and limitations of your health insurance plan.  Please call member services at your health plan with any benefit or coverage questions.      If X-rays, CT or MRI's have been performed, please contact the facility where they were done to arrange for , prior to your scheduled appointment.  Please bring this referral request to your appointment and present it to your specialist.                  Your next 10 appointments already scheduled     Feb 19, 2018  3:00 PM CST   [facility] with Jumana Story   [facility]    [address] [address] [phone]              Who to contact     If you have questions or need follow up information about today's clinic visit or your schedule please contact [facility] directly at [phone].  Normal or non-critical lab and imaging results will be communicated to you by MyChart, letter or phone within 4 business days after the clinic has received the results. If you do not hear from us within 7 days, please contact the clinic through [employer] or phone. If you have a critical or abnormal lab result, we will notify you by phone as soon as possible.  Submit refill requests through [facility] or call your pharmacy and they will forward the refill request to us. Please allow 3 business days for your refill to be completed.          Additional Information About Your Visit        Cross[employer]know Information     [facility] lets you send messages to your doctor, view your test results, renew your  "prescriptions, schedule appointments and more. To sign up, go to [email or web address] . Click on \"Log in\" on the left side of the screen, which will take you to the Welcome page. Then click on \"Sign up Now\" on the right side of the page.     You will be asked to enter the access code listed below, as well as some personal information. Please follow the directions to create your username and password.     Your access code is: [email or web address]  Expires: 3/11/2018 12:41 PM     Your access code will  in 90 days. If you need help or a new code, please call your [place] clinic or [phone].        Care EveryWhere ID     This is your Care EveryWhere ID. This could be used by other organizations to access your [place] medical records  [id number]        Your Vitals Were     Pulse Temperature Pulse Oximetry BMI (Body Mass Index)          90 98.7  F (37.1  C) (Tympanic) 94% 21.77 kg/m2         Blood Pressure from Last 3 Encounters:   18 (!) 134/94   18 149/77   18 142/78    Weight from Last 3 Encounters:   18 139 lb (63 kg)   18 140 lb (63.5 kg)   18 140 lb (63.5 kg)              We Performed the Following     ORTHO  REFERRAL        Primary Care Provider Office Phone # Fax #    Brandie Leonela Hill [phone] [address] [address]        Equal Access to Services     BONNIE BUTLER : Hadii aad ku hadasho Somayuriali, waaxda luqadaha, qaybta kaalmada adeelie, hang chaudhary. So [facility] [phone].    ATENCIÓN: Si habla español, tiene a galaviz disposición servicios gratuitos de asistencia lingüística. Llame al [phone].    We comply with applicable federal civil rights laws and [place] laws. We do not discriminate on the basis of race, color, national origin, age, disability, sex, sexual orientation, or gender identity.            Thank you!     Thank you for choosing [facility]  for your care. " Our goal is always to provide you with excellent care. Hearing back from our patients is one way we can continue to improve our services. Please take a few minutes to complete the written survey that you may receive in the mail after your visit with us. Thank you!             Your Updated Medication List - Protect others around you: Learn how to safely use, store and throw away your medicines at www.disposemymeds.org.          This list is accurate as of 1/29/18  2:46 PM.  Always use your most recent med list.                   Brand Name Dispense Instructions for use Diagnosis    ASPIRIN PO      Take 325 mg by mouth daily        chlorhexidine 0.12 % solution    PERIDEX     SWISH AND SPIT TWICE DAILY AS DIRECTED        cyclobenzaprine 10 MG tablet    FLEXERIL    10 tablet    Take 1 tablet (10 mg) by mouth 3 times daily as needed for muscle spasms    Acute bilateral low back pain without sciatica       DEPAKOTE PO      Take 500 mg by mouth 2 times daily        HYDROcodone-acetaminophen 5-325 MG per tablet    NORCO    20 tablet    Take 1 tablet by mouth every 4 hours as needed for moderate to severe pain        MIRTAZAPINE PO      Take 45 mg by mouth At Bedtime        order for DME     2 each    Equipment being ordered: bilateral below the knee LILIBETH stockings    Bilateral leg edema       simvastatin 10 MG tablet    ZOCOR    90 tablet    Take 1 tablet (10 mg) by mouth At Bedtime    Hyperlipidemia LDL goal <130       TYLENOL PO      Take 1,000 mg by mouth 2 times daily as needed for mild pain or fever

## 2018-01-29 NOTE — PATIENT INSTRUCTIONS
1. Schedule an appointment with spine specialist           Thank you for choosing [facility].  You may be receiving a survey in the mail from Bianca Kohli regarding your visit today.  Please take a few minutes to complete and return the survey to let us know how we are doing.      If you have questions or concerns, please contact us via 1DayLater or you can contact your care team at [phone].    Our Clinic hours are:  Monday 6:40 am  to 7:00 pm  Tuesday -Friday 6:40 am to 5:00 pm    The [place] outpatient lab hours are:  Monday - Friday 6:10 am to 4:45 pm  Saturdays 7:00 am to 11:00 am  Appointments are required, call [phone]    If you have clinical questions after hours or would like to schedule an appointment,  call the clinic at [phone].

## 2018-01-29 NOTE — MR AVS SNAPSHOT
After Visit Summary   1/29/2018    Nroa Alarcon    MRN: [id number]           Patient Information     Date Of Birth          1950        Visit Information        Provider Department      1/29/2018 1:45 PM Trinh Hi PA-C [facility]        Care Instructions    WOUND CARE INSTRUCTIONS   FOR CRYOSURGERY   This area treated with liquid nitrogen will form a blister. You do not need to bandage the area until after the blister forms and breaks (which may be a few days). When the blister breaks, begin daily dressing changes as follows:   1) Clean and dry the area with tap water using clean Q-tip or sterile gauze pad.   2) Apply Polysporin ointment or Bacitracin ointment over entire wound. Do NOT use Neosporin ointment.   3) Cover the wound with a band-aid or sterile non-stick gauze pad and micropore paper tape.   REPEAT THESE INSTRUCTIONS AT LEAST ONCE A DAY UNTIL THE WOUND HAS COMPLETELY HEALED.   It is an old wives tale that a wound heals better when it is exposed to air and allowed to dry out. The wound will heal faster with a better cosmetic result if it is kept moist with ointment and covered with a bandage.   Do not let the wound dry out.   IMPORTANT INFORMATION ON REVERSE SIDE   Supplies Needed:   *Cotton tipped applicators (Q-tips)   *Polysporin ointment or Bacitracin ointment (NOT NEOSPORIN)   *Band-aids, or non stick gauze pads and micropore paper tape   PATIENT INFORMATION   During the healing process you will notice a number of changes. All wounds develop a small halo of redness surrounding the wound. This means healing is occurring. Severe itching with extensive redness usually indicates sensitivity to the ointment or bandage tape used to dress the wound. You should call our office if this develops.   Swelling and/or discoloration around your surgical site is common, particularly when performed around the eye.   All wounds normally drain. The larger the wound the more  drainage there will be. After 7-10 days, you will notice the wound beginning to shrink and new skin will begin to grow. The wound is healed when you can see skin has formed over the entire area. A healed wound has a healthy, shiny look to the surface and is red to dark pink in color to normalize. Wounds may take approximately 4-6 weeks to heal. Larger wounds may take 6-8 weeks. After the wound is healed you may discontinue dressing changes.   You may experience a sensation of tightness as your wound heals. This is normal and will gradually subside.   Your healed wound may be sensitive to temperature changes. This sensitivity improves with time, but if you re having a lot of discomfort, try to avoid temperature extremes.   Patients frequently experience itching after their wound appears to have healed because of the continue healing under the skin. Plain Vaseline will help relieve the itching.                 Follow-ups after your visit        Your next 10 appointments already scheduled     Jan 29, 2018  1:45 PM CST   [facility] Trinh [facility] PA-C   [facility]    [address] [address]   [phone]            Jan 29, 2018  2:40 PM CST   Office Visit with JESUS Welsh CNP   [facility]    [address] [address]   [phone]           Bring a current list of meds and any records pertaining to this visit. For Physicals, please bring immunization records and any forms needing to be filled out. Please arrive 10 minutes early to complete paperwork.            Feb 19, 2018  3:00 PM CST   Return Visit with Jumana Story   [facility])    [address] [address]   [phone]              Who to contact     If you have questions or need follow up information about today's clinic visit or your schedule please  "contact [facility] directly at [phone].  Normal or non-critical lab and imaging results will be communicated to you by MyChart, letter or phone within 4 business days after the clinic has received the results. If you do not hear from us within 7 days, please contact the clinic through MyChart or phone. If you have a critical or abnormal lab result, we will notify you by phone as soon as possible.  Submit refill requests through bizHive or call your pharmacy and they will forward the refill request to us. Please allow 3 business days for your refill to be completed.          Additional Information About Your Visit        [facility] [employer] Information     bizHive lets you send messages to your doctor, view your test results, renew your prescriptions, schedule appointments and more. To sign up, go to [email or web address] . Click on \"Log in\" on the left side of the screen, which will take you to the Welcome page. Then click on \"Sign up Now\" on the right side of the page.     You will be asked to enter the access code listed below, as well as some personal information. Please follow the directions to create your username and password.     Your access code is: [email or web address]  Expires: 3/11/2018 12:41 PM     Your access code will  in 90 days. If you need help or a new code, please call your [place] clinic or [phone].        Care EveryWhere ID     This is your Care EveryWhere ID. This could be used by other organizations to access your [place] medical records  [id number]         Blood Pressure from Last 3 Encounters:   18 149/77   18 142/78   18 144/76    Weight from Last 3 Encounters:   18 63.5 kg (140 lb)   18 63.5 kg (140 lb)   17 63.5 kg (140 lb)              Today, you had the following     No orders found for display       Primary Care Provider Office Phone # Fax #    Brandie Leonela Hill [phone] [address] " [id number]        Equal Access to Services     [facility] : Hadii paxton myrick cristina Boucher, wagenesisda luqadaha, qaybta kakerida sofia, hang chaudhary. So [facility] [phone].    ATENCIÓN: Si habla español, tiene a galaviz disposición servicios gratuitos de asistencia lingüística. Herberthame al [phone].    We comply with applicable federal civil rights laws and [place] laws. We do not discriminate on the basis of race, color, national origin, age, disability, sex, sexual orientation, or gender identity.            Thank you!     Thank you for choosing [facility]  for your care. Our goal is always to provide you with excellent care. Hearing back from our patients is one way we can continue to improve our services. Please take a few minutes to complete the written survey that you may receive in the mail after your visit with us. Thank you!             Your Updated Medication List - Protect others around you: Learn how to safely use, store and throw away your medicines at www.disposemymeds.org.          This list is accurate as of 1/29/18  1:41 PM.  Always use your most recent med list.                   Brand Name Dispense Instructions for use Diagnosis    ASPIRIN PO      Take 325 mg by mouth daily        azithromycin 250 MG tablet    ZITHROMAX    6 tablet    Two tablets first day, then one tablet daily for four days.    Bronchitis       benzonatate 200 MG capsule    TESSALON    21 capsule    Take 1 capsule (200 mg) by mouth 3 times daily as needed for cough    Bronchitis       chlorhexidine 0.12 % solution    PERIDEX     SWISH AND SPIT TWICE DAILY AS DIRECTED        cyclobenzaprine 10 MG tablet    FLEXERIL    10 tablet    Take 1 tablet (10 mg) by mouth 3 times daily as needed for muscle spasms    Acute bilateral low back pain without sciatica       DEPAKOTE PO      Take 500 mg by mouth 2 times daily        HYDROcodone-acetaminophen 5-325 MG per tablet    NORCO    20 tablet    Take 1  tablet by mouth every 4 hours as needed for moderate to severe pain        MIRTAZAPINE PO      Take 45 mg by mouth At Bedtime        order for DME     2 each    Equipment being ordered: bilateral below the knee LILIBETH stockings    Bilateral leg edema       simvastatin 10 MG tablet    ZOCOR    90 tablet    Take 1 tablet (10 mg) by mouth At Bedtime    Hyperlipidemia LDL goal <130       TYLENOL PO      Take 1,000 mg by mouth 2 times daily as needed for mild pain or fever

## 2018-01-29 NOTE — NURSING NOTE
"Initial BP (!) 134/94 (BP Location: Left arm, Patient Position: Chair, Cuff Size: Adult Regular)  Pulse 90  Temp 98.7  F (37.1  C) (Tympanic)  Wt 139 lb (63 kg)  SpO2 94%  BMI 21.77 kg/m2 Estimated body mass index is 21.77 kg/(m^2) as calculated from the following:    Height as of 1/15/18: 5' 7\" (1.702 m).    Weight as of this encounter: 139 lb (63 kg). .    Cande Pepe    "

## 2018-01-29 NOTE — NURSING NOTE
"Initial /78  Pulse 90  Temp 98.7  F (37.1  C) (Tympanic)  Wt 139 lb (63 kg)  SpO2 94%  BMI 21.77 kg/m2 Estimated body mass index is 21.77 kg/(m^2) as calculated from the following:    Height as of 1/15/18: 5' 7\" (1.702 m).    Weight as of this encounter: 139 lb (63 kg). .    Cande Pepe    "

## 2018-01-31 ENCOUNTER — TRANSFERRED RECORDS (OUTPATIENT)
Dept: HEALTH INFORMATION MANAGEMENT | Facility: CLINIC | Age: 68
End: 2018-01-31

## 2018-02-05 ENCOUNTER — OFFICE VISIT (OUTPATIENT)
Dept: FAMILY MEDICINE | Facility: CLINIC | Age: 68
End: 2018-02-05
Payer: COMMERCIAL

## 2018-02-05 VITALS
HEART RATE: 96 BPM | TEMPERATURE: 97.2 F | HEIGHT: 67 IN | BODY MASS INDEX: 21.82 KG/M2 | SYSTOLIC BLOOD PRESSURE: 136 MMHG | DIASTOLIC BLOOD PRESSURE: 80 MMHG | WEIGHT: 139 LBS | OXYGEN SATURATION: 95 %

## 2018-02-05 DIAGNOSIS — E78.5 HYPERLIPIDEMIA LDL GOAL <130: ICD-10-CM

## 2018-02-05 DIAGNOSIS — N18.30 CKD (CHRONIC KIDNEY DISEASE) STAGE 3, GFR 30-59 ML/MIN (H): ICD-10-CM

## 2018-02-05 DIAGNOSIS — Z01.818 PREOP GENERAL PHYSICAL EXAM: Primary | ICD-10-CM

## 2018-02-05 DIAGNOSIS — F31.0 BIPOLAR AFFECTIVE DISORDER, CURRENT EPISODE HYPOMANIC (H): ICD-10-CM

## 2018-02-05 DIAGNOSIS — S32.059A CLOSED FRACTURE OF FIFTH LUMBAR VERTEBRA, UNSPECIFIED FRACTURE MORPHOLOGY, INITIAL ENCOUNTER (H): ICD-10-CM

## 2018-02-05 DIAGNOSIS — Z86.73 HISTORY OF CVA (CEREBROVASCULAR ACCIDENT): ICD-10-CM

## 2018-02-05 PROCEDURE — 93000 ELECTROCARDIOGRAM COMPLETE: CPT | Performed by: NURSE PRACTITIONER

## 2018-02-05 PROCEDURE — 99215 OFFICE O/P EST HI 40 MIN: CPT | Performed by: NURSE PRACTITIONER

## 2018-02-05 RX ORDER — HYDROCODONE BITARTRATE AND ACETAMINOPHEN 5; 325 MG/1; MG/1
1 TABLET ORAL EVERY 4 HOURS PRN
Qty: 20 TABLET | Refills: 0 | Status: SHIPPED | OUTPATIENT
Start: 2018-02-05 | End: 2018-04-24

## 2018-02-05 NOTE — NURSING NOTE
"Initial /80  Pulse 96  Temp 97.2  F (36.2  C) (Tympanic)  Ht 5' 7\" (1.702 m)  Wt 139 lb (63 kg)  SpO2 95%  BMI 21.77 kg/m2 Estimated body mass index is 21.77 kg/(m^2) as calculated from the following:    Height as of this encounter: 5' 7\" (1.702 m).    Weight as of this encounter: 139 lb (63 kg). .    Cande Pepe    "

## 2018-02-05 NOTE — PATIENT INSTRUCTIONS
1. Stop aspirin 5 days before surgery  2. Do not use any ibuprofen 2 days before surgery     Before Your Surgery      Call your surgeon if there is any change in your health. This includes signs of a cold or flu (such as a sore throat, runny nose, cough, rash or fever).    Do not smoke, drink alcohol or take over the counter medicine (unless your surgeon or primary care doctor tells you to) for the 24 hours before and after surgery.    If you take prescribed drugs: Follow your doctor s orders about which medicines to take and which to stop until after surgery.    Eating and drinking prior to surgery: follow the instructions from your surgeon    Take a shower or bath the night before surgery. Use the soap your surgeon gave you to gently clean your skin. If you do not have soap from your surgeon, use your regular soap. Do not shave or scrub the surgery site.  Wear clean pajamas and have clean sheets on your bed.

## 2018-02-05 NOTE — MR AVS SNAPSHOT
After Visit Summary   2/5/2018    Nora Alarcon    MRN: [id number]           Patient Information     Date Of Birth          1950        Visit Information        Provider Department      2/5/2018 11:20 AM Brandie Chris APRN CNP [facility]        Today's Diagnoses     Preop general physical exam    -  1    Closed fracture of fifth lumbar vertebra, unspecified fracture morphology, initial encounter (H)          Care Instructions      1. Stop aspirin 5 days before surgery  2. Do not use any ibuprofen 2 days before surgery     Before Your Surgery      Call your surgeon if there is any change in your health. This includes signs of a cold or flu (such as a sore throat, runny nose, cough, rash or fever).    Do not smoke, drink alcohol or take over the counter medicine (unless your surgeon or primary care doctor tells you to) for the 24 hours before and after surgery.    If you take prescribed drugs: Follow your doctor s orders about which medicines to take and which to stop until after surgery.    Eating and drinking prior to surgery: follow the instructions from your surgeon    Take a shower or bath the night before surgery. Use the soap your surgeon gave you to gently clean your skin. If you do not have soap from your surgeon, use your regular soap. Do not shave or scrub the surgery site.  Wear clean pajamas and have clean sheets on your bed.           Follow-ups after your visit        Your next 10 appointments already scheduled     Feb 19, 2018  3:00 PM CST   Return Visit with Jumana Story   [facility] ([facility]    [address] [address] [phone]              Who to contact     If you have questions or need follow up information about today's clinic visit or your schedule please contact [facility] directly at [phone].  Normal or non-critical lab and imaging results will be communicated to you by  "MyChart, letter or phone within 4 business days after the clinic has received the results. If you do not hear from us within 7 days, please contact the clinic through [employer] or phone. If you have a critical or abnormal lab result, we will notify you by phone as soon as possible.  Submit refill requests through [employer] or call your pharmacy and they will forward the refill request to us. Please allow 3 business days for your refill to be completed.          Additional Information About Your Visit        [employer] Information     [employer] lets you send messages to your doctor, view your test results, renew your prescriptions, schedule appointments and more. To sign up, go to [email or web address] . Click on \"Log in\" on the left side of the screen, which will take you to the Welcome page. Then click on \"Sign up Now\" on the right side of the page.     You will be asked to enter the access code listed below, as well as some personal information. Please follow the directions to create your username and password.     Your access code is: [email or web address]  Expires: 3/11/2018 12:41 PM     Your access code will  in 90 days. If you need help or a new code, please call your [place] clinic or [phone].        Care EveryWhere ID     This is your Care EveryWhere ID. This could be used by other organizations to access your [place] medical records  [id number]        Your Vitals Were     Pulse Temperature Height Pulse Oximetry BMI (Body Mass Index)       96 97.2  F (36.2  C) (Tympanic) 5' 7\" (1.702 m) 95% 21.77 kg/m2        Blood Pressure from Last 3 Encounters:   18 148/79   18 130/78   18 149/77    Weight from Last 3 Encounters:   18 139 lb (63 kg)   18 139 lb (63 kg)   18 140 lb (63.5 kg)              We Performed the Following     EKG 12-lead complete w/read - Clinics          Where to get your medicines      Some of these will need a paper prescription and others can be bought over " the counter.  Ask your nurse if you have questions.     Bring a paper prescription for each of these medications     HYDROcodone-acetaminophen 5-325 MG per tablet          Primary Care Provider Office Phone # Fax #    Brandie Hill [phone] [address] [address]        Equal Access to Services     BONNIE BUTLER : Hadii aad ku hadasho Soomaali, waaxda luqadaha, qaybta kaalmada adeegyada, waxay idiin hayaan adeeg kharash laneelam . So [facility] [phone].    ATENCIÓN: Si habla español, tiene a galaviz disposición servicios gratuitos de asistencia lingüística. Llame al [phone].    We comply with applicable federal civil rights laws and [place] laws. We do not discriminate on the basis of race, color, national origin, age, disability, sex, sexual orientation, or gender identity.            Thank you!     Thank you for choosing [facility]  for your care. Our goal is always to provide you with excellent care. Hearing back from our patients is one way we can continue to improve our services. Please take a few minutes to complete the written survey that you may receive in the mail after your visit with us. Thank you!             Your Updated Medication List - Protect others around you: Learn how to safely use, store and throw away your medicines at www.disposemymeds.org.          This list is accurate as of 2/5/18 11:47 AM.  Always use your most recent med list.                   Brand Name Dispense Instructions for use Diagnosis    ASPIRIN PO      Take 325 mg by mouth daily        chlorhexidine 0.12 % solution    PERIDEX     SWISH AND SPIT TWICE DAILY AS DIRECTED        cyclobenzaprine 10 MG tablet    FLEXERIL    10 tablet    Take 1 tablet (10 mg) by mouth 3 times daily as needed for muscle spasms    Acute bilateral low back pain without sciatica       DEPAKOTE PO      Take 500 mg by mouth 2 times daily        HYDROcodone-acetaminophen 5-325 MG per tablet    NORCO     20 tablet    Take 1 tablet by mouth every 4 hours as needed for moderate to severe pain    Closed fracture of fifth lumbar vertebra, unspecified fracture morphology, initial encounter (H)       MIRTAZAPINE PO      Take 45 mg by mouth At Bedtime        order for DME     2 each    Equipment being ordered: bilateral below the knee LILIBETH stockings    Bilateral leg edema       simvastatin 10 MG tablet    ZOCOR    90 tablet    Take 1 tablet (10 mg) by mouth At Bedtime    Hyperlipidemia LDL goal <130       TYLENOL PO      Take 1,000 mg by mouth 2 times daily as needed for mild pain or fever
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
## 2018-02-05 NOTE — NURSING NOTE
"Initial /79 (BP Location: Left arm, Patient Position: Chair, Cuff Size: Adult Regular)  Pulse 96  Temp 97.2  F (36.2  C) (Tympanic)  Ht 5' 7\" (1.702 m)  Wt 139 lb (63 kg)  SpO2 95%  BMI 21.77 kg/m2 Estimated body mass index is 21.77 kg/(m^2) as calculated from the following:    Height as of this encounter: 5' 7\" (1.702 m).    Weight as of this encounter: 139 lb (63 kg). .    Cande Pepe    "

## 2018-02-07 ENCOUNTER — TELEPHONE (OUTPATIENT)
Dept: FAMILY MEDICINE | Facility: CLINIC | Age: 68
End: 2018-02-07

## 2018-02-07 NOTE — TELEPHONE ENCOUNTER
Reason for Call:  Needs guidelines for blood pressure    Detailed comments: patient is calling stating Dr. Hill took her off all of her BP medications. Would like some guidelines on what to do to calm herself down when she gets anxious. Her BP's have been running.140/80.    Phone Number Patient can be reached at: Home number on file [phone] (home)    Best Time: any    Can we leave a detailed message on this number? YES   Pavithra Ponce  Clinic Station  [facility]      Call taken on 2/7/2018 at 3:41 PM by Pavithra Ponce

## 2018-02-08 NOTE — TELEPHONE ENCOUNTER
Patient calls us back and she is very anxious about her BP. Per patient she has been anxious lately and this is probably why her BP's are around 140's/80's.  Offered her an appt with Gilda Mo to help with anxiety and asked her to schedule an appt with a provider to review her BP's. Arcelia VARGAS RN

## 2018-02-19 ENCOUNTER — OFFICE VISIT (OUTPATIENT)
Dept: AUDIOLOGY | Facility: CLINIC | Age: 68
End: 2018-02-19
Payer: MEDICARE

## 2018-02-19 DIAGNOSIS — H90.3 SENSORINEURAL HEARING LOSS, BILATERAL: Primary | ICD-10-CM

## 2018-02-19 PROCEDURE — V5299 HEARING SERVICE: HCPCS | Performed by: AUDIOLOGIST

## 2018-02-19 NOTE — PROGRESS NOTES
AUDIOLOGY REPORT    SUBJECTIVE:Nora Alarcon is a 67 year old female who was seen in the Audiology Clinic at the [facility] on 2/19/2018  for a follow-up check regarding the fitting of new hearing aids.  The patient has been seen previously in this clinic and was fit with Phonak Audeo B50-R hearing aids on 12/11/2017.  Nora reports increased wear time with the heaing aids. This is her second hearing aid progress check.    OBJECTIVE:   The International Outcome Inventory-Hearing Aids (IOI-HA) was administered today.The patient s responses to the 7 questions can be compared to normative data relative to how others are performing with their hearing aids, as well as focusing audiologic care and counseling.This patient s Quality of Life score (Question 7) was 4, which is above normative average.     Based on patient report, the following changes were made;reviewed care and cleaning and changing of her wax guard.    Reviewed 45 day trial period, care, cleaning (no water, dry brush), batteries (size recharable) insertion/removal, toxicity, low-battery signal), aid insertion/removal, volume adjustment (if applicable), user booklet, warranty information, storage cases, and other hearing aid details.        ASSESSMENT: A follow-up appointment for hearing aid fitting was completed today. IOI-HA administered today.Changes to hearing aid was completed as outlined above.     PLAN:Nora will return for follow-up as needed, or at least every 9-12 months for cleaning and assessment of hearing aid.  . Please call this clinic with any questions regarding today s appointment.    Kathe Sneed M.A. F-AAA, #[id number]

## 2018-02-19 NOTE — MR AVS SNAPSHOT
"              After Visit Summary   2018    Nora Alarcon    MRN: [id number]           Patient Information     Date Of Birth          1950        Visit Information        Provider Department      2018 3:00 PM Kathe Sneed AuD [facility]        Today's Diagnoses     Sensorineural hearing loss, bilateral    -  1       Follow-ups after your visit        Who to contact     If you have questions or need follow up information about today's clinic visit or your schedule please contact [facility] directly at [phone].  Normal or non-critical lab and imaging results will be communicated to you by [employer], letter or phone within 4 business days after the clinic has received the results. If you do not hear from us within 7 days, please contact the clinic through [employer] or phone. If you have a critical or abnormal lab result, we will notify you by phone as soon as possible.  Submit refill requests through [employer] or call your pharmacy and they will forward the refill request to us. Please allow 3 business days for your refill to be completed.          Additional Information About Your Visit        MyChart Information     [employer] lets you send messages to your doctor, view your test results, renew your prescriptions, schedule appointments and more. To sign up, go to [email or web address] . Click on \"Log in\" on the left side of the screen, which will take you to the Welcome page. Then click on \"Sign up Now\" on the right side of the page.     You will be asked to enter the access code listed below, as well as some personal information. Please follow the directions to create your username and password.     Your access code is: [email or web address]  Expires: 3/11/2018 12:41 PM     Your access code will  in 90 days. If you need help or a new code, please call your [facility] or [phone].        Care EveryWhere ID     This is your Care EveryWhere ID. This could be used by " other organizations to access your [place] medical records  [id number]         Blood Pressure from Last 3 Encounters:   02/05/18 136/80   01/29/18 130/78   01/27/18 149/77    Weight from Last 3 Encounters:   02/05/18 139 lb (63 kg)   01/29/18 139 lb (63 kg)   01/27/18 140 lb (63.5 kg)              We Performed the Following     HEARING AID CHECK/NO CHARGE        Primary Care Provider Office Phone # Fax #    Brandie Hill,  [phone] [address] [facility] [address]        Equal Access to Services     BONNIE BUTLER : Hadii aad ku hadasho Somayuriali, waaxda luqadaha, qaybta kaalmada adeegyada, hang [place] . So [facility] [phone].    ATENCIÓN: Si habla español, tiene a galaviz disposición servicios gratuitos de asistencia lingüística. Llame al [phone].    We comply with applicable federal civil rights laws and [place] laws. We do not discriminate on the basis of race, color, national origin, age, disability, sex, sexual orientation, or gender identity.            Thank you!     Thank you for choosing [facility]  for your care. Our goal is always to provide you with excellent care. Hearing back from our patients is one way we can continue to improve our services. Please take a few minutes to complete the written survey that you may receive in the mail after your visit with us. Thank you!             Your Updated Medication List - Protect others around you: Learn how to safely use, store and throw away your medicines at www.disposemymeds.org.          This list is accurate as of 2/19/18  3:54 PM.  Always use your most recent med list.                   Brand Name Dispense Instructions for use Diagnosis    ASPIRIN PO      Take 325 mg by mouth daily        chlorhexidine 0.12 % solution    PERIDEX     SWISH AND SPIT TWICE DAILY AS DIRECTED        cyclobenzaprine 10 MG tablet    FLEXERIL    10 tablet    Take 1 tablet (10 mg) by mouth 3 times daily  as needed for muscle spasms    Acute bilateral low back pain without sciatica       DEPAKOTE PO      Take 500 mg by mouth 2 times daily        HYDROcodone-acetaminophen 5-325 MG per tablet    NORCO    20 tablet    Take 1 tablet by mouth every 4 hours as needed for moderate to severe pain    Closed fracture of fifth lumbar vertebra, unspecified fracture morphology, initial encounter (H)       MIRTAZAPINE PO      Take 45 mg by mouth At Bedtime        order for DME     2 each    Equipment being ordered: bilateral below the knee LILIBETH stockings    Bilateral leg edema       simvastatin 10 MG tablet    ZOCOR    90 tablet    Take 1 tablet (10 mg) by mouth At Bedtime    Hyperlipidemia LDL goal <130       TYLENOL PO      Take 1,000 mg by mouth 2 times daily as needed for mild pain or fever

## 2018-03-08 ENCOUNTER — TRANSFERRED RECORDS (OUTPATIENT)
Dept: HEALTH INFORMATION MANAGEMENT | Facility: CLINIC | Age: 68
End: 2018-03-08

## 2018-03-14 ENCOUNTER — TRANSFERRED RECORDS (OUTPATIENT)
Dept: HEALTH INFORMATION MANAGEMENT | Facility: CLINIC | Age: 68
End: 2018-03-14

## 2018-03-14 ENCOUNTER — RECORDS - HEALTHEAST (OUTPATIENT)
Dept: LAB | Facility: CLINIC | Age: 68
End: 2018-03-14

## 2018-03-14 LAB
ALP SERPL-CCNC: 60 U/L (ref 45–120)
CALCIUM SERPL-MCNC: 9 MG/DL (ref 8.5–10.5)
PTH-INTACT SERPL-MCNC: 86 PG/ML (ref 10–86)

## 2018-03-15 LAB — 25(OH)D3 SERPL-MCNC: 14.7 NG/ML (ref 30–80)

## 2018-03-29 ENCOUNTER — HOSPITAL ENCOUNTER (OUTPATIENT)
Dept: BONE DENSITY | Facility: CLINIC | Age: 68
Discharge: HOME | End: 2018-03-29
Payer: MEDICARE

## 2018-03-29 DIAGNOSIS — M81.0 POST-MENOPAUSAL OSTEOPOROSIS: ICD-10-CM

## 2018-03-29 PROCEDURE — 77080 DXA BONE DENSITY AXIAL: CPT

## 2018-04-09 ENCOUNTER — OFFICE VISIT (OUTPATIENT)
Dept: FAMILY MEDICINE | Facility: CLINIC | Age: 68
End: 2018-04-09
Payer: COMMERCIAL

## 2018-04-09 VITALS
BODY MASS INDEX: 21.16 KG/M2 | TEMPERATURE: 97.4 F | HEART RATE: 79 BPM | HEIGHT: 67 IN | WEIGHT: 134.8 LBS | DIASTOLIC BLOOD PRESSURE: 72 MMHG | SYSTOLIC BLOOD PRESSURE: 128 MMHG | OXYGEN SATURATION: 94 %

## 2018-04-09 DIAGNOSIS — J20.9 ACUTE BRONCHITIS WITH SYMPTOMS GREATER THAN 10 DAYS: Primary | ICD-10-CM

## 2018-04-09 PROCEDURE — 99213 OFFICE O/P EST LOW 20 MIN: CPT | Performed by: NURSE PRACTITIONER

## 2018-04-09 RX ORDER — ALBUTEROL SULFATE 90 UG/1
2 AEROSOL, METERED RESPIRATORY (INHALATION) EVERY 6 HOURS PRN
Qty: 1 INHALER | Refills: 0 | Status: SHIPPED | OUTPATIENT
Start: 2018-04-09 | End: 2018-09-14

## 2018-04-09 RX ORDER — AZITHROMYCIN 250 MG/1
TABLET, FILM COATED ORAL
Qty: 6 TABLET | Refills: 0 | Status: SHIPPED | OUTPATIENT
Start: 2018-04-09 | End: 2018-04-24

## 2018-04-09 NOTE — NURSING NOTE
"Chief Complaint   Patient presents with     Cough     2 weeks        Initial /72  Pulse 79  Temp 97.4  F (36.3  C) (Tympanic)  Ht 5' 7\" (1.702 m)  Wt 134 lb 12.8 oz (61.1 kg)  SpO2 94%  BMI 21.11 kg/m2 Estimated body mass index is 21.11 kg/(m^2) as calculated from the following:    Height as of this encounter: 5' 7\" (1.702 m).    Weight as of this encounter: 134 lb 12.8 oz (61.1 kg).  Medication Reconciliation: complete  "

## 2018-04-09 NOTE — PATIENT INSTRUCTIONS
Albuterol 1-2 puffs every 6 hrs as needed for shortness of breath  And wheezing  Z-pack, 2 tablets today and than 1 tablet on day 2-5  Take Mucinex, or guaifenesin, or robitussin as needed for cough

## 2018-04-09 NOTE — MR AVS SNAPSHOT
"              After Visit Summary   4/9/2018    Nora Alarcon    MRN: [id number]           Patient Information     Date Of Birth          1950        Visit Information        Provider Department      4/9/2018 3:00 PM Judy Mcdaniel APRN CNP [facility]        Today's Diagnoses     Acute bronchitis with symptoms greater than 10 days    -  1      Care Instructions    Albuterol 1-2 puffs every 6 hrs as needed for shortness of breath  And wheezing  Z-pack, 2 tablets today and than 1 tablet on day 2-5  Take Mucinex, or guaifenesin, or robitussin as needed for cough              Follow-ups after your visit        Who to contact     If you have questions or need follow up information about today's clinic visit or your schedule please contact [facility] directly at [phone].  Normal or non-critical lab and imaging results will be communicated to you by [employer], letter or phone within 4 business days after the clinic has received the results. If you do not hear from us within 7 days, please contact the clinic through [employer] or phone. If you have a critical or abnormal lab result, we will notify you by phone as soon as possible.  Submit refill requests through [employer] or call your pharmacy and they will forward the refill request to us. Please allow 3 business days for your refill to be completed.          Additional Information About Your Visit        MyChart Information     [employer] lets you send messages to your doctor, view your test results, renew your prescriptions, schedule appointments and more. To sign up, go to [email or web address] . Click on \"Log in\" on the left side of the screen, which will take you to the Welcome page. Then click on \"Sign up Now\" on the right side of the page.     You will be asked to enter the access code listed below, as well as some personal information. Please follow the directions to create your username and password.     Your access code " "is: [email or web address]  Expires: 2018  3:35 PM     Your access code will  in 90 days. If you need help or a new code, please call your [place] clinic or [phone].        Care EveryWhere ID     This is your Care EveryWhere ID. This could be used by other organizations to access your [place] medical records  [id number]        Your Vitals Were     Pulse Temperature Height Pulse Oximetry BMI (Body Mass Index)       79 97.4  F (36.3  C) (Tympanic) 5' 7\" (1.702 m) 94% 21.11 kg/m2        Blood Pressure from Last 3 Encounters:   18 128/72   18 136/80   18 130/78    Weight from Last 3 Encounters:   18 134 lb 12.8 oz (61.1 kg)   18 139 lb (63 kg)   18 139 lb (63 kg)              Today, you had the following     No orders found for display         Today's Medication Changes          These changes are accurate as of 18  3:35 PM.  If you have any questions, ask your nurse or doctor.               Start taking these medicines.        Dose/Directions    albuterol 108 (90 BASE) MCG/ACT Inhaler   Commonly known as:  PROAIR HFA/PROVENTIL HFA/VENTOLIN HFA   Used for:  Acute bronchitis with symptoms greater than 10 days   Started by:  Judy Mcdaniel APRN CNP        Dose:  2 puff   Inhale 2 puffs into the lungs every 6 hours as needed for shortness of breath / dyspnea or wheezing   Quantity:  1 Inhaler   Refills:  0       azithromycin 250 MG tablet   Commonly known as:  ZITHROMAX   Used for:  Acute bronchitis with symptoms greater than 10 days   Started by:  Judy Mcdaniel APRN CNP        Two tablets first day, then one tablet daily for four days.   Quantity:  6 tablet   Refills:  0            Where to get your medicines      These medications were sent to [facility] [place], [place] - [address]  [address] [address]     Phone:  [phone]     albuterol 108 (90 BASE) MCG/ACT Inhaler    azithromycin 250 MG tablet                " Primary Care Provider Office Phone # Fax #    Brandie Hill [phone] [address] [facility] [address]        Equal Access to Services     BONNIE BUTLER : Hadii aad ku hadambrosioo Somayuriali, waaxda luqadaha, qaybta kaalmada adeelie, hang garcia frankieyolanda hinkle felix chaudhary. So [facility] [phone].    ATENCIÓN: Si habla español, tiene a galaviz disposición servicios gratuitos de asistencia lingüística. Llame al [phone].    We comply with applicable federal civil rights laws and [place] laws. We do not discriminate on the basis of race, color, national origin, age, disability, sex, sexual orientation, or gender identity.            Thank you!     Thank you for choosing [facility]  for your care. Our goal is always to provide you with excellent care. Hearing back from our patients is one way we can continue to improve our services. Please take a few minutes to complete the written survey that you may receive in the mail after your visit with us. Thank you!             Your Updated Medication List - Protect others around you: Learn how to safely use, store and throw away your medicines at www.disposemymeds.org.          This list is accurate as of 4/9/18  3:35 PM.  Always use your most recent med list.                   Brand Name Dispense Instructions for use Diagnosis    albuterol 108 (90 BASE) MCG/ACT Inhaler    PROAIR HFA/PROVENTIL HFA/VENTOLIN HFA    1 Inhaler    Inhale 2 puffs into the lungs every 6 hours as needed for shortness of breath / dyspnea or wheezing    Acute bronchitis with symptoms greater than 10 days       ASPIRIN PO      Take 325 mg by mouth daily        azithromycin 250 MG tablet    ZITHROMAX    6 tablet    Two tablets first day, then one tablet daily for four days.    Acute bronchitis with symptoms greater than 10 days       chlorhexidine 0.12 % solution    PERIDEX     SWISH AND SPIT TWICE DAILY AS DIRECTED        cyclobenzaprine 10 MG tablet     FLEXERIL    10 tablet    Take 1 tablet (10 mg) by mouth 3 times daily as needed for muscle spasms    Acute bilateral low back pain without sciatica       DEPAKOTE PO      Take 500 mg by mouth 2 times daily        HYDROcodone-acetaminophen 5-325 MG per tablet    NORCO    20 tablet    Take 1 tablet by mouth every 4 hours as needed for moderate to severe pain    Closed fracture of fifth lumbar vertebra, unspecified fracture morphology, initial encounter (H)       MIRTAZAPINE PO      Take 45 mg by mouth At Bedtime        order for DME     2 each    Equipment being ordered: bilateral below the knee LILIBEHT stockings    Bilateral leg edema       simvastatin 10 MG tablet    ZOCOR    90 tablet    Take 1 tablet (10 mg) by mouth At Bedtime    Hyperlipidemia LDL goal <130       TYLENOL PO      Take 1,000 mg by mouth 2 times daily as needed for mild pain or fever

## 2018-04-09 NOTE — PROGRESS NOTES
"  SUBJECTIVE:   Nora Alarcon is a 67 year old female who presents to clinic today for the following health issues:    ENT Symptoms             Symptoms: cc Present Absent Comment   Fever/Chills   x    Fatigue  x  Not sleeping good    Muscle Aches   x    Eye Irritation  x  Is allergic to her cat    Sneezing   x    Nasal Juan Ramon/Drg  x     Sinus Pressure/Pain   x    Loss of smell   x    Dental pain   x    Sore Throat   x    Swollen Glands   x    Ear Pain/Fullness   x    Cough  x     Wheeze  x     Chest Pain   x    Shortness of breath  x     Rash   x    Other         Symptom duration: 2 weeks    Symptom severity:  moderate   Treatments tried: Delsym    Contacts: No known        Problem list and histories reviewed & adjusted, as indicated.  Additional history: as documented    Labs reviewed in EPIC    Reviewed and updated as needed this visit by clinical staff  Tobacco  Allergies  Med Hx  Surg Hx  Fam Hx  Soc Hx      Reviewed and updated as needed this visit by Provider         ROS:  Constitutional, HEENT, cardiovascular, pulmonary, gi and gu systems are negative, except as otherwise noted.    OBJECTIVE:     /72  Pulse 79  Temp 97.4  F (36.3  C) (Tympanic)  Ht 5' 7\" (1.702 m)  Wt 134 lb 12.8 oz (61.1 kg)  SpO2 94%  BMI 21.11 kg/m2  Body mass index is 21.11 kg/(m^2).  GENERAL: healthy, alert and no distress  EYES: Eyes grossly normal to inspection, PERRL and conjunctivae and sclerae normal  HENT: ear canals and TM's normal, nose and mouth without ulcers or lesions  NECK: no adenopathy, no asymmetry, masses, or scars and thyroid normal to palpation  RESP: lungs clear to auscultation - no rales, rhonchi or wheezes  CV: regular rate and rhythm, normal S1 S2, no S3 or S4, no murmur, click or rub, no peripheral edema and peripheral pulses strong  PSYCH: mentation appears normal, affect normal/bright    Diagnostic Test Results:  none     ASSESSMENT/PLAN:     1. Acute bronchitis with symptoms greater than 10 " days  -due to duration of symptoms will treat with antibiotic to cover atypical pathogens   - azithromycin (ZITHROMAX) 250 MG tablet; Two tablets first day, then one tablet daily for four days.  Dispense: 6 tablet; Refill: 0  - albuterol (PROAIR HFA/PROVENTIL HFA/VENTOLIN HFA) 108 (90 BASE) MCG/ACT Inhaler; Inhale 2 puffs into the lungs every 6 hours as needed for shortness of breath / dyspnea or wheezing  Dispense: 1 Inhaler; Refill: 0  -guaifenesin, or robitussin as needed for cough     See Patient Instructions    JESUS [facility]

## 2018-04-24 ENCOUNTER — OFFICE VISIT (OUTPATIENT)
Dept: FAMILY MEDICINE | Facility: CLINIC | Age: 68
End: 2018-04-24
Payer: COMMERCIAL

## 2018-04-24 VITALS
BODY MASS INDEX: 21.03 KG/M2 | HEIGHT: 67 IN | RESPIRATION RATE: 24 BRPM | WEIGHT: 134 LBS | HEART RATE: 73 BPM | SYSTOLIC BLOOD PRESSURE: 141 MMHG | DIASTOLIC BLOOD PRESSURE: 52 MMHG | OXYGEN SATURATION: 98 %

## 2018-04-24 DIAGNOSIS — J18.9 COMMUNITY ACQUIRED PNEUMONIA OF LEFT LOWER LOBE OF LUNG: Primary | ICD-10-CM

## 2018-04-24 PROCEDURE — 99214 OFFICE O/P EST MOD 30 MIN: CPT | Performed by: NURSE PRACTITIONER

## 2018-04-24 RX ORDER — DOXYCYCLINE 100 MG/1
100 CAPSULE ORAL 2 TIMES DAILY
Qty: 14 CAPSULE | Refills: 0 | Status: SHIPPED | OUTPATIENT
Start: 2018-04-24 | End: 2018-05-01

## 2018-04-24 NOTE — NURSING NOTE
"Chief Complaint   Patient presents with     URI       Initial /52  Pulse 73  Resp 24  Ht 5' 7\" (1.702 m)  Wt 134 lb (60.8 kg)  SpO2 98%  BMI 20.99 kg/m2 Estimated body mass index is 20.99 kg/(m^2) as calculated from the following:    Height as of this encounter: 5' 7\" (1.702 m).    Weight as of this encounter: 134 lb (60.8 kg).  Medication Reconciliation: complete    "
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
## 2018-04-24 NOTE — PATIENT INSTRUCTIONS
1.  Take antibiotics as directed twice daily for 7 days.    2.  Use Mucinex to continue to expectorize secretions.    3.  Follow-up in clinic if any worsening or persistent symptoms.    Thank you for choosing [facility].  You may be receiving a survey in the mail from Bianca Kohli regarding your visit today.  Please take a few minutes to complete and return the survey to let us know how we are doing.      If you have questions or concerns, please contact us via [employer] or you can contact your care team at [phone].    Our Clinic hours are:  Monday 6:40 am  to 7:00 pm  Tuesday -Friday 6:40 am to 5:00 pm    The [place] outpatient lab hours are:  Monday - Friday 6:10 am to 4:45 pm  Saturdays 7:00 am to 11:00 am  Appointments are required, call [phone]    If you have clinical questions after hours or would like to schedule an appointment,  call the clinic at [phone].    Pneumonia (Adult)  Pneumonia is an infection deep within the lungs. It is in the small air sacs (alveoli). Pneumonia may be caused by a virus or bacteria. Pneumonia caused by bacteria is usually treated with an antibiotic. Severe cases may need to be treated in the hospital. Milder cases can be treated at home. Symptoms usually start to get better during the first 2 days of treatment.    Home care  Follow these guidelines when caring for yourself at home:    Rest at home for the first 2 to 3 days, or until you feel stronger. Don t let yourself get overly tired when you go back to your activities.    Stay away from cigarette smoke - yours or other people s.    You may use acetaminophen or ibuprofen to control fever or pain, unless another medicine was prescribed. If you have chronic liver or kidney disease, talk with your healthcare provider before using these medicines. Also talk with your provider if you ve had a stomach ulcer or gastrointestinal bleeding. Don t give aspirin to anyone younger than 18 years of age who is ill with a  fever. It may cause severe liver damage.    Your appetite may be poor, so a light diet is fine.    Drink 6 to 8 glasses of fluids every day to make sure you are getting enough fluids. Beverages can include water, sport drinks, sodas without caffeine, juices, tea, or soup. Fluids will help loosen secretions in the lung. This will make it easier for you to cough up the phlegm (sputum). If you also have heart or kidney disease, check with your healthcare provider before you drink extra fluids.    Take antibiotic medicine prescribed until it is all gone, even if you are feeling better after a few days.  Follow-up care  Follow up with your healthcare provider in the next 2 to 3 days, or as advised. This is to be sure the medicine is helping you get better.  If you are 65 or older, you should get a pneumococcal vaccine and a yearly flu (influenza) shot. You should also get these vaccines if you have chronic lung disease like asthma, emphysema, or COPD. Recently, a second type of pneumonia vaccine has become available for everyone over 65 years old. This is in addition to the previous vaccine. Ask your provider about this.  When to seek medical advice  Call your healthcare provider right away if any of these occur:    You don t get better within the first 48 hours of treatment    Shortness of breath gets worse    Rapid breathing (more than 25 breaths per minute)    Coughing up blood    Chest pain gets worse with breathing    Fever of 100.4 F (38 C) or higher that doesn t get better with fever medicine    Weakness, dizziness, or fainting that gets worse    Thirst or dry mouth that gets worse    Sinus pain, headache, or a stiff neck    Chest pain not caused by coughing  Date Last Reviewed: 1/1/2017 2000-2017 [employer] [address] [place] [address]. All rights reserved. This information is not intended as a substitute for professional medical care. Always follow your healthcare professional's  instructions.

## 2018-04-24 NOTE — MR AVS SNAPSHOT
After Visit Summary   4/24/2018    Nora Alarcon    MRN: [id number]           Patient Information     Date Of Birth          1950        Visit Information        Provider Department      4/24/2018 3:40 PM Courtney Mtz NP [facility]        Today's Diagnoses     Community acquired pneumonia of left lower lobe of lung (H)    -  1      Care Instructions      1.  Take antibiotics as directed twice daily for 7 days.    2.  Use Mucinex to continue to expectorize secretions.    3.  Follow-up in clinic if any worsening or persistent symptoms.    Thank you for choosing [facility].  You may be receiving a survey in the mail from [employer] regarding your visit today.  Please take a few minutes to complete and return the survey to let us know how we are doing.      If you have questions or concerns, please contact us via [employer] or you can contact your care team at [phone].    Our Clinic hours are:  Monday 6:40 am  to 7:00 pm  Tuesday -Friday 6:40 am to 5:00 pm    The [place] outpatient lab hours are:  Monday - Friday 6:10 am to 4:45 pm  Saturdays 7:00 am to 11:00 am  Appointments are required, call [phone]    If you have clinical questions after hours or would like to schedule an appointment,  call the clinic at [phone].    Pneumonia (Adult)  Pneumonia is an infection deep within the lungs. It is in the small air sacs (alveoli). Pneumonia may be caused by a virus or bacteria. Pneumonia caused by bacteria is usually treated with an antibiotic. Severe cases may need to be treated in the hospital. Milder cases can be treated at home. Symptoms usually start to get better during the first 2 days of treatment.    Home care  Follow these guidelines when caring for yourself at home:    Rest at home for the first 2 to 3 days, or until you feel stronger. Don t let yourself get overly tired when you go back to your activities.    Stay away from cigarette smoke - yours or  other people s.    You may use acetaminophen or ibuprofen to control fever or pain, unless another medicine was prescribed. If you have chronic liver or kidney disease, talk with your healthcare provider before using these medicines. Also talk with your provider if you ve had a stomach ulcer or gastrointestinal bleeding. Don t give aspirin to anyone younger than 18 years of age who is ill with a fever. It may cause severe liver damage.    Your appetite may be poor, so a light diet is fine.    Drink 6 to 8 glasses of fluids every day to make sure you are getting enough fluids. Beverages can include water, sport drinks, sodas without caffeine, juices, tea, or soup. Fluids will help loosen secretions in the lung. This will make it easier for you to cough up the phlegm (sputum). If you also have heart or kidney disease, check with your healthcare provider before you drink extra fluids.    Take antibiotic medicine prescribed until it is all gone, even if you are feeling better after a few days.  Follow-up care  Follow up with your healthcare provider in the next 2 to 3 days, or as advised. This is to be sure the medicine is helping you get better.  If you are 65 or older, you should get a pneumococcal vaccine and a yearly flu (influenza) shot. You should also get these vaccines if you have chronic lung disease like asthma, emphysema, or COPD. Recently, a second type of pneumonia vaccine has become available for everyone over 65 years old. This is in addition to the previous vaccine. Ask your provider about this.  When to seek medical advice  Call your healthcare provider right away if any of these occur:    You don t get better within the first 48 hours of treatment    Shortness of breath gets worse    Rapid breathing (more than 25 breaths per minute)    Coughing up blood    Chest pain gets worse with breathing    Fever of 100.4 F (38 C) or higher that doesn t get better with fever medicine    Weakness, dizziness, or  "fainting that gets worse    Thirst or dry mouth that gets worse    Sinus pain, headache, or a stiff neck    Chest pain not caused by coughing  Date Last Reviewed: 2017-2017 [employer]. [address] [place] [address]. All rights reserved. This information is not intended as a substitute for professional medical care. Always follow your healthcare professional's instructions.                Follow-ups after your visit        Follow-up notes from your care team     Return if symptoms worsen or fail to improve.      Who to contact     If you have questions or need follow up information about today's clinic visit or your schedule please contact [facility] directly at [phone].  Normal or non-critical lab and imaging results will be communicated to you by MyChart, letter or phone within 4 business days after the clinic has received the results. If you do not hear from us within 7 days, please contact the clinic through [employer] or phone. If you have a critical or abnormal lab result, we will notify you by phone as soon as possible.  Submit refill requests through [employer] or call your pharmacy and they will forward the refill request to us. Please allow 3 business days for your refill to be completed.          Additional Information About Your Visit        [employer] Information     [employer] lets you send messages to your doctor, view your test results, renew your prescriptions, schedule appointments and more. To sign up, go to [email or web address] . Click on \"Log in\" on the left side of the screen, which will take you to the Welcome page. Then click on \"Sign up Now\" on the right side of the page.     You will be asked to enter the access code listed below, as well as some personal information. Please follow the directions to create your username and password.     Your access code is: [email or web address]  Expires: 2018  3:35 PM     Your access code will  in 90 days. " "If you need help or a new code, please call your [place] clinic or [phone].        Care EveryWhere ID     This is your Care EveryWhere ID. This could be used by other organizations to access your [place] medical records  [id number]        Your Vitals Were     Pulse Respirations Height Pulse Oximetry BMI (Body Mass Index)       73 24 5' 7\" (1.702 m) 98% 20.99 kg/m2        Blood Pressure from Last 3 Encounters:   04/24/18 141/52   04/09/18 128/72   02/05/18 136/80    Weight from Last 3 Encounters:   04/24/18 134 lb (60.8 kg)   04/09/18 134 lb 12.8 oz (61.1 kg)   02/05/18 139 lb (63 kg)              Today, you had the following     No orders found for display         Today's Medication Changes          These changes are accurate as of 4/24/18  4:10 PM.  If you have any questions, ask your nurse or doctor.               Start taking these medicines.        Dose/Directions    doxycycline 100 MG capsule   Commonly known as:  VIBRAMYCIN   Used for:  Community acquired pneumonia of left lower lobe of lung (H)        Dose:  100 mg   Take 1 capsule (100 mg) by mouth 2 times daily for 7 days   Quantity:  14 capsule   Refills:  0            Where to get your medicines      These medications were sent to [facility] [address]  [address] [place], [place] [id number]     Phone:  [phone]     doxycycline 100 MG capsule                Primary Care Provider Office Phone # Fax #    Brandie Gipson DO Sergio [phone] [address] [facility] [address]        Equal Access to Services     VICKI BUTLER AH: Hadii aad ku hadasho Soomaali, waaxda luqadaha, qaybta kaalmada frankieegron, hang chaudhary. So [facility] [phone].    ATENCIÓN: Si habla español, tiene a galaviz disposición servicios gratuitos de asistencia lingüística. Llame al [phone].    We comply with applicable federal civil rights laws and [place] laws. We do not discriminate on the " basis of race, color, national origin, age, disability, sex, sexual orientation, or gender identity.            Thank you!     Thank you for choosing [facility]  for your care. Our goal is always to provide you with excellent care. Hearing back from our patients is one way we can continue to improve our services. Please take a few minutes to complete the written survey that you may receive in the mail after your visit with us. Thank you!             Your Updated Medication List - Protect others around you: Learn how to safely use, store and throw away your medicines at www.disposemymeds.org.          This list is accurate as of 4/24/18  4:10 PM.  Always use your most recent med list.                   Brand Name Dispense Instructions for use Diagnosis    albuterol 108 (90 Base) MCG/ACT Inhaler    PROAIR HFA/PROVENTIL HFA/VENTOLIN HFA    1 Inhaler    Inhale 2 puffs into the lungs every 6 hours as needed for shortness of breath / dyspnea or wheezing    Acute bronchitis with symptoms greater than 10 days       ASPIRIN PO      Take 325 mg by mouth daily        DEPAKOTE PO      Take 500 mg by mouth 2 times daily        doxycycline 100 MG capsule    VIBRAMYCIN    14 capsule    Take 1 capsule (100 mg) by mouth 2 times daily for 7 days    Community acquired pneumonia of left lower lobe of lung (H)       MIRTAZAPINE PO      Take 45 mg by mouth At Bedtime        order for DME     2 each    Equipment being ordered: bilateral below the knee LILIBETH stockings    Bilateral leg edema       simvastatin 10 MG tablet    ZOCOR    90 tablet    Take 1 tablet (10 mg) by mouth At Bedtime    Hyperlipidemia LDL goal <130       TYLENOL PO      Take 1,000 mg by mouth 2 times daily as needed for mild pain or fever

## 2018-04-26 ENCOUNTER — TELEPHONE (OUTPATIENT)
Dept: FAMILY MEDICINE | Facility: CLINIC | Age: 68
End: 2018-04-26

## 2018-04-26 NOTE — TELEPHONE ENCOUNTER
"Reason for call:  Patient reporting a symptom    Symptom or request: Pt states that when she was seen recently for pneumonia, she was told that she \"still has chronic kidney disease.\"  She was under the impression that the kidney disease was cleared up?  She states that she needs to know so that she knows if she can take Ibuprofen or not?     Duration (how long have symptoms been present): ongoing    Have you been treated for this before? Yes    Additional comments:     Phone Number patient can be reached at:  Home number on file [phone] (home)    Best Time:  any    Can we leave a detailed message on this number:  YES    Call taken on 4/26/2018 at 12:24 PM by Odette Posada    "

## 2018-04-26 NOTE — TELEPHONE ENCOUNTER
Her kidney function tests over th last 1-1/2 years normal. I will remove the diagnosis from her chart. She is okay to take ibuprofen, stay hydrated, take it with food

## 2018-05-01 ENCOUNTER — TRANSFERRED RECORDS (OUTPATIENT)
Dept: HEALTH INFORMATION MANAGEMENT | Facility: CLINIC | Age: 68
End: 2018-05-01

## 2018-06-25 DIAGNOSIS — E78.5 HYPERLIPIDEMIA LDL GOAL <130: ICD-10-CM

## 2018-06-25 NOTE — TELEPHONE ENCOUNTER
"Requested Prescriptions   Pending Prescriptions Disp Refills     simvastatin (ZOCOR) 10 MG tablet [Pharmacy Med Name: SIMVASTATIN 10MG TAB]  Last Written Prescription Date:  06/27/17  Last Fill Quantity: 90,  # refills: 3   Last office visit: 4/24/2018 with prescribing provider:  04/24/18   Future Office Visit:     90 tablet      Sig: TAKE 1 TABLET BY MOUTH AT BEDTIME    Statins Protocol Passed    6/25/2018  1:03 AM       Passed - LDL on file in past 12 months    Recent Labs   Lab Test  11/07/17   1043   LDL  78          Passed - No abnormal creatine kinase in past 12 months    No lab results found.        Passed - Recent (12 mo) or future (30 days) visit within the authorizing provider's specialty    Patient had office visit in the last 12 months or has a visit in the next 30 days with authorizing provider or within the authorizing provider's specialty.  See \"Patient Info\" tab in inbasket, or \"Choose Columns\" in Meds & Orders section of the refill encounter.           Passed - Patient is age 18 or older       Passed - No active pregnancy on record       Passed - No positive pregnancy test in past 12 months          "

## 2018-06-27 RX ORDER — SIMVASTATIN 10 MG
TABLET ORAL
Qty: 90 TABLET | Refills: 1 | Status: SHIPPED | OUTPATIENT
Start: 2018-06-27 | End: 2018-09-14

## 2018-06-28 ENCOUNTER — OFFICE VISIT (OUTPATIENT)
Dept: PODIATRY | Facility: CLINIC | Age: 68
End: 2018-06-28
Payer: COMMERCIAL

## 2018-06-28 VITALS — WEIGHT: 134 LBS | HEIGHT: 67 IN | BODY MASS INDEX: 21.03 KG/M2

## 2018-06-28 DIAGNOSIS — B35.1 ONYCHOMYCOSIS OF TOENAIL: Primary | ICD-10-CM

## 2018-06-28 PROCEDURE — 99203 OFFICE O/P NEW LOW 30 MIN: CPT | Performed by: PODIATRIST

## 2018-06-28 NOTE — MR AVS SNAPSHOT
"              After Visit Summary   6/28/2018    Nora Alarcon    MRN: [id number]           Patient Information     Date Of Birth          1950        Visit Information        Provider Department      6/28/2018 1:00 PM Ar Swain DPM [facility]        Care Instructions    [employer]  [facility]  Rosalie Hankins RN   [phone]  Serving [place] and [facility]            Follow-ups after your visit        Follow-up notes from your care team     Return in about 4 weeks (around 7/26/2018).      Who to contact     If you have questions or need follow up information about today's clinic visit or your schedule please contact [facility] directly at [phone].  Normal or non-critical lab and imaging results will be communicated to you by MyChart, letter or phone within 4 business days after the clinic has received the results. If you do not hear from us within 7 days, please contact the clinic through MyChart or phone. If you have a critical or abnormal lab result, we will notify you by phone as soon as possible.  Submit refill requests through [employer] or call your pharmacy and they will forward the refill request to us. Please allow 3 business days for your refill to be completed.          Additional Information About Your Visit        Care EveryWhere ID     This is your Care EveryWhere ID. This could be used by other organizations to access your [place] medical records  [id number]        Your Vitals Were     Height BMI (Body Mass Index)                5' 7\" (1.702 m) 20.99 kg/m2           Blood Pressure from Last 3 Encounters:   06/28/18 (P) 131/78   04/24/18 141/52   04/09/18 128/72    Weight from Last 3 Encounters:   06/28/18 134 lb (60.8 kg)   04/24/18 134 lb (60.8 kg)   04/09/18 134 lb 12.8 oz (61.1 kg)              Today, you had the following     No orders found for display       Primary Care Provider " Office Phone # Fax #    Brandie Hill [phone] [address] [facility] [address]        Equal Access to Services     BONNIE BUTLER : Hadii aad ku hadabmrosiojono Somayuriali, waaxda luqadaha, qaybta kaalmada sofia, hang garcia frankieyolanda hinkle felix chaudhary. So [facility] [phone].    ATENCIÓN: Si habla español, tiene a galaviz disposición servicios gratuitos de asistencia lingüística. Llame al [phone].    We comply with applicable federal civil rights laws and [place] laws. We do not discriminate on the basis of race, color, national origin, age, disability, sex, sexual orientation, or gender identity.            Thank you!     Thank you for choosing [facility]  for your care. Our goal is always to provide you with excellent care. Hearing back from our patients is one way we can continue to improve our services. Please take a few minutes to complete the written survey that you may receive in the mail after your visit with us. Thank you!             Your Updated Medication List - Protect others around you: Learn how to safely use, store and throw away your medicines at www.disposemymeds.org.          This list is accurate as of 6/28/18  1:28 PM.  Always use your most recent med list.                   Brand Name Dispense Instructions for use Diagnosis    albuterol 108 (90 Base) MCG/ACT Inhaler    PROAIR HFA/PROVENTIL HFA/VENTOLIN HFA    1 Inhaler    Inhale 2 puffs into the lungs every 6 hours as needed for shortness of breath / dyspnea or wheezing    Acute bronchitis with symptoms greater than 10 days       ASPIRIN PO      Take 325 mg by mouth daily        DEPAKOTE PO      Take 500 mg by mouth 2 times daily        MIRTAZAPINE PO      Take 45 mg by mouth At Bedtime        order for DME     2 each    Equipment being ordered: bilateral below the knee LILIBETH stockings    Bilateral leg edema       simvastatin 10 MG tablet    ZOCOR    90 tablet    TAKE 1 TABLET BY  MOUTH AT BEDTIME    Hyperlipidemia LDL goal <130       TYLENOL PO      Take 1,000 mg by mouth 2 times daily as needed for mild pain or fever

## 2018-06-28 NOTE — PATIENT INSTRUCTIONS
[employer] [facility] [employer]  [facility]  Rosalie Hankins RN   [phone]  Serving [place] and [facility]
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
## 2018-07-09 ENCOUNTER — HOSPITAL ENCOUNTER (EMERGENCY)
Facility: CLINIC | Age: 68
Discharge: HOME | End: 2018-07-09
Attending: NURSE PRACTITIONER | Admitting: NURSE PRACTITIONER
Payer: MEDICARE

## 2018-07-09 ENCOUNTER — APPOINTMENT (OUTPATIENT)
Dept: CT IMAGING | Facility: CLINIC | Age: 68
End: 2018-07-09
Attending: NURSE PRACTITIONER
Payer: MEDICARE

## 2018-07-09 ENCOUNTER — APPOINTMENT (OUTPATIENT)
Dept: GENERAL RADIOLOGY | Facility: CLINIC | Age: 68
End: 2018-07-09
Attending: NURSE PRACTITIONER
Payer: MEDICARE

## 2018-07-09 VITALS
SYSTOLIC BLOOD PRESSURE: 164 MMHG | DIASTOLIC BLOOD PRESSURE: 81 MMHG | BODY MASS INDEX: 23.05 KG/M2 | OXYGEN SATURATION: 98 % | RESPIRATION RATE: 16 BRPM | HEIGHT: 64 IN | TEMPERATURE: 97.5 F | WEIGHT: 135 LBS

## 2018-07-09 DIAGNOSIS — M25.552 HIP PAIN, LEFT: ICD-10-CM

## 2018-07-09 DIAGNOSIS — S05.11XA ORBITAL CONTUSION, RIGHT, INITIAL ENCOUNTER: Primary | ICD-10-CM

## 2018-07-09 PROCEDURE — 99284 EMERGENCY DEPT VISIT MOD MDM: CPT | Mod: Z6 | Performed by: NURSE PRACTITIONER

## 2018-07-09 PROCEDURE — 73502 X-RAY EXAM HIP UNI 2-3 VIEWS: CPT

## 2018-07-09 PROCEDURE — 70486 CT MAXILLOFACIAL W/O DYE: CPT

## 2018-07-09 PROCEDURE — 99284 EMERGENCY DEPT VISIT MOD MDM: CPT | Mod: 25

## 2018-07-09 RX ORDER — ABALOPARATIDE 2000 UG/ML
80 INJECTION, SOLUTION SUBCUTANEOUS DAILY
COMMUNITY
Start: 2018-05-21 | End: 2021-02-26

## 2018-07-09 ASSESSMENT — ENCOUNTER SYMPTOMS
ACTIVITY CHANGE: 0
FEVER: 0
SHORTNESS OF BREATH: 0
CONSTIPATION: 0
CHILLS: 0
CONFUSION: 0
EYE REDNESS: 0
NUMBNESS: 0
DIAPHORESIS: 0
COUGH: 0
SORE THROAT: 0
EYE DISCHARGE: 0
DIFFICULTY URINATING: 0
DYSURIA: 0
DIARRHEA: 0
WHEEZING: 0
FATIGUE: 0
DIZZINESS: 0
VOMITING: 0
WOUND: 1
NAUSEA: 0
EYE PAIN: 1
ABDOMINAL PAIN: 0
SEIZURES: 0
PHOTOPHOBIA: 0
EYE ITCHING: 0
HEADACHES: 0

## 2018-07-09 NOTE — ED NOTES
Pt tripped on Sat, she missed her step and landed on her knees and hit her face. She has no c/o of facial pain, visual changes or knee pain. Does have pain in her knees that is not new for her and there is no change in her knee pain following her fall. She is a/o x 4.  at bedside. Monitor

## 2018-07-09 NOTE — ED AVS SNAPSHOT
[facility] Emergency Department    [address] [facility] [address]    Phone:  [phone]    Fax:  [phone]                                       Nora Alarcon   MRN: [id number]    Department:  [facility] Emergency Department   Date of Visit:  7/9/2018           After Visit Summary Signature Page     I have received my discharge instructions, and my questions have been answered. I have discussed any challenges I see with this plan with the nurse or doctor.    ..........................................................................................................................................  Patient/Patient Representative Signature      ..........................................................................................................................................  Patient Representative Print Name and Relationship to Patient    ..................................................               ................................................  Date                                            Time    ..........................................................................................................................................  Reviewed by Signature/Title    ...................................................              ..............................................  Date                                                            Time

## 2018-07-09 NOTE — ED AVS SNAPSHOT
[facility] Emergency Department    [address] [address]    Phone:  [phone]    Fax:  [phone]                                       Nora Alarcon   MRN: [id number]    Department:  [facility] Emergency Department   Date of Visit:  7/9/2018           Patient Information     Date Of Birth          1950        Your diagnoses for this visit were:     Orbital contusion, right, initial encounter     Hip pain, left        You were seen by Brandie Gottlieb APRN CNP.        Discharge Instructions         Follow up with Brandie Hill as needed   Eye Contusion  A contusion is another word for a bruise. It happens when small blood vessels break open and leak blood into the nearby area. An eye contusion is usually caused by something hitting the eye or nose. You may have pain and swelling around the eye. The skin may also change color (it may be red at first and then darken). For this reason, an eye contusion is often called a black eye.  If needed, imaging tests, such as an X-ray, may be done to help rule out more serious problems.  Pain and swelling should improve within a few days. Bruising may take longer to go away.  Home care    If you have been prescribed medicines for pain, take them as directed.    To help reduce swelling and pain for the first day or two, apply a cold pack to the injured eye for up to 20 minutes. Do this as often as directed. You can make an ice pack by filling a plastic bag that seals at the top with ice cubes, and then wrapping it with a thin towel. Never put a cold pack directly on the skin.  Note about concussion  Because the injury was to your face or head, it is possible that you could have a mild brain injury called a concussion. Symptoms of a concussion can show up later. For this reason, you need to watch for symptoms of concussion once you re home.   Call 911 if you have any of the symptoms below over the next hours to  days:    Headache    Nausea or vomiting    Dizziness    Sensitivity to light or noise    Unusual sleepiness or grogginess    Trouble falling asleep    Personality changes    Vision changes    Memory loss    Confusion    Trouble walking or clumsiness    Loss of consciousness (even for a short time)    Inability to be awakened   Follow-up care  Follow up with your healthcare provider, or as directed. If imaging tests were done, they may need to be reviewed by a healthcare provider. You ll be told the results and any new findings that may affect your treatment.  When to seek medical advice  Call your healthcare provider right away if any of these occur:     Pain, bruising, or swelling worsens    Vision changes, such as seeing small dots or double vision    Inability to move the eye    Bleeding on the eyeball surface  Date Last Reviewed: 2/1/2017 2000-2017 [employer]. [address] [place] [address]. All rights reserved. This information is not intended as a substitute for professional medical care. Always follow your healthcare professional's instructions.          24 Hour Appointment Hotline       To make an appointment at any [facility], call [phone] ([phone]). If you don't have a family doctor or clinic, we will help you find one. [facility] are conveniently located to serve the needs of you and your family.             Review of your medicines      Our records show that you are taking the medicines listed below. If these are incorrect, please call your family doctor or clinic.        Dose / Directions Last dose taken    albuterol 108 (90 Base) MCG/ACT Inhaler   Commonly known as:  PROAIR HFA/PROVENTIL HFA/VENTOLIN HFA   Dose:  2 puff   Quantity:  1 Inhaler        Inhale 2 puffs into the lungs every 6 hours as needed for shortness of breath / dyspnea or wheezing   Refills:  0        ASPIRIN PO   Dose:  325 mg        Take 325 mg by mouth daily   Refills:  0         DEPAKOTE PO   Dose:  500 mg        Take 500 mg by mouth 2 times daily   Refills:  0        MIRTAZAPINE PO   Dose:  45 mg        Take 45 mg by mouth At Bedtime   Refills:  0        order for DME   Quantity:  2 each        Equipment being ordered: bilateral below the knee LILIBETH stockings   Refills:  2        simvastatin 10 MG tablet   Commonly known as:  ZOCOR   Quantity:  90 tablet        TAKE 1 TABLET BY MOUTH AT BEDTIME   Refills:  1        TYLENOL PO   Dose:  1000 mg        Take 1,000 mg by mouth 2 times daily as needed for mild pain or fever   Refills:  0        TYMLOS 3120 MCG/1.56ML Sopn injection   Dose:  80 mcg   Generic drug:  Abaloparatide        Inject 80 mcg Subcutaneous daily   Refills:  0                Procedures and tests performed during your visit     Maxillofacial  CT w/o contrast    Pelvis XR w/ unilateral hip left      Orders Needing Specimen Collection     None      Pending Results     Date and Time Order Name Status Description    7/9/2018 1113 Maxillofacial  CT w/o contrast Preliminary             Pending Culture Results     No orders found from 7/7/2018 to 7/10/2018.            Pending Results Instructions     If you had any lab results that were not finalized at the time of your Discharge, you can call the ED Lab Result RN at [phone]. You will be contacted by this team for any positive Lab results or changes in treatment. The nurses are available 7 days a week from 10A to 6:30P.  You can leave a message 24 hours per day and they will return your call.        Test Results From Your Hospital Stay        7/9/2018 12:11 PM      Narrative     CT SCAN OF THE FACE WITHOUT CONTRAST 7/9/2018 11:54 AM     HISTORY:  Fell and hit face two days ago, pain upper right orbit  region.     TECHNIQUE: Axial CT images of the facial bones were completed with  sagittal and coronal reformations. Radiation dose for this scan was  reduced using automated exposure control, adjustment of the mA and/or  kV according  to patient size, or iterative reconstruction technique.     COMPARISON: Head CT 4/8/2016.    FINDINGS:  Moderate soft tissue swelling over the right supraorbital  region. No intraorbital swelling appreciated. No acute facial bone  fracture. Surgical hardware along the right lateral orbital wall,  right nasal bridge, and anterior and lateral right maxillary sinus  again seen, likely due to previous facial reconstruction. The hardware  appears grossly intact without significant change since prior.    The visualized paranasal sinuses are clear.     No mass identified within the visualized soft tissues of the neck.  Degenerative changes in the spine.    Area of encephalomalacia within the right frontal operculum is poorly  evaluated on these images but appears grossly similar to prior.        Impression     IMPRESSION:   1. Right supraorbital soft tissue swelling without underlying acute  fracture.  2. Sequela of previous right-sided facial reconstruction with surgical  hardware. The appearance is unchanged since prior CT 4/8/2016.         7/9/2018 12:30 PM      Narrative     XR PELVIS AND HIP LEFT 1 VIEW 7/9/2018 11:59 AM    HISTORY: Fall 2 days ago. Pain.    COMPARISON: None.        Impression     IMPRESSION: An AP pelvis and left lateral hip show no acute fracture  or dislocation.    LILLY BUSTOS MD                Thank you for choosing [facility]       Thank you for choosing [facility] for your care. Our goal is always to provide you with excellent care. Hearing back from our patients is one way we can continue to improve our services. Please take a few minutes to complete the written survey that you may receive in the mail after you visit with us. Thank you!        Care EveryWhere ID     This is your Care EveryWhere ID. This could be used by other organizations to access your [facility] medical records  [id number]        Equal Access to Services     BONNIE BUTLER : zenaida Love qaybta  hang yung ah. So [facility] [phone].    ATENCIÓN: Si habla español, tiene a galaviz disposición servicios gratuitos de asistencia lingüística. Llame al [phone].    We comply with applicable federal civil rights laws and [place] laws. We do not discriminate on the basis of race, color, national origin, age, disability, sex, sexual orientation, or gender identity.            After Visit Summary       This is your record. Keep this with you and show to your community pharmacist(s) and doctor(s) at your next visit.

## 2018-07-09 NOTE — DISCHARGE INSTRUCTIONS
Follow up with Brandie Hill as needed   Eye Contusion  A contusion is another word for a bruise. It happens when small blood vessels break open and leak blood into the nearby area. An eye contusion is usually caused by something hitting the eye or nose. You may have pain and swelling around the eye. The skin may also change color (it may be red at first and then darken). For this reason, an eye contusion is often called a black eye.  If needed, imaging tests, such as an X-ray, may be done to help rule out more serious problems.  Pain and swelling should improve within a few days. Bruising may take longer to go away.  Home care    If you have been prescribed medicines for pain, take them as directed.    To help reduce swelling and pain for the first day or two, apply a cold pack to the injured eye for up to 20 minutes. Do this as often as directed. You can make an ice pack by filling a plastic bag that seals at the top with ice cubes, and then wrapping it with a thin towel. Never put a cold pack directly on the skin.  Note about concussion  Because the injury was to your face or head, it is possible that you could have a mild brain injury called a concussion. Symptoms of a concussion can show up later. For this reason, you need to watch for symptoms of concussion once you re home.   Call 911 if you have any of the symptoms below over the next hours to days:    Headache    Nausea or vomiting    Dizziness    Sensitivity to light or noise    Unusual sleepiness or grogginess    Trouble falling asleep    Personality changes    Vision changes    Memory loss    Confusion    Trouble walking or clumsiness    Loss of consciousness (even for a short time)    Inability to be awakened   Follow-up care  Follow up with your healthcare provider, or as directed. If imaging tests were done, they may need to be reviewed by a healthcare provider. You ll be told the results and any new findings that may affect your treatment.  When  to seek medical advice  Call your healthcare provider right away if any of these occur:     Pain, bruising, or swelling worsens    Vision changes, such as seeing small dots or double vision    Inability to move the eye    Bleeding on the eyeball surface  Date Last Reviewed: 2/1/2017 2000-2017 [employer]. [address] [place] [address]. All rights reserved. This information is not intended as a substitute for professional medical care. Always follow your healthcare professional's instructions.
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
## 2018-07-13 ENCOUNTER — HOSPITAL ENCOUNTER (OUTPATIENT)
Dept: PHYSICAL THERAPY | Facility: CLINIC | Age: 68
Setting detail: THERAPIES SERIES
End: 2018-07-13
Attending: PHYSICAL MEDICINE & REHABILITATION
Payer: MEDICARE

## 2018-07-13 PROCEDURE — 97110 THERAPEUTIC EXERCISES: CPT | Mod: GP | Performed by: PHYSICAL THERAPIST

## 2018-07-13 PROCEDURE — G8979 MOBILITY GOAL STATUS: HCPCS | Mod: GP,CI | Performed by: PHYSICAL THERAPIST

## 2018-07-13 PROCEDURE — 40000718 ZZHC STATISTIC PT DEPARTMENT ORTHO VISIT: Performed by: PHYSICAL THERAPIST

## 2018-07-13 PROCEDURE — G8978 MOBILITY CURRENT STATUS: HCPCS | Mod: GP,CJ | Performed by: PHYSICAL THERAPIST

## 2018-07-13 PROCEDURE — 97161 PT EVAL LOW COMPLEX 20 MIN: CPT | Mod: GP | Performed by: PHYSICAL THERAPIST

## 2018-07-13 NOTE — PROGRESS NOTES
[facility]          OUTPATIENT PHYSICAL THERAPY ORTHOPEDIC EVALUATION  PLAN OF TREATMENT FOR OUTPATIENT REHABILITATION  (COMPLETE FOR INITIAL CLAIMS ONLY)  Patient's Last Name, First Name, M.I.  YOB: 1950  Nora Alarcon       Provider s Name:  [facility]   Medical Record No.  [id number]   Start of Care Date:  07/13/18   Onset Date:  03/08/18   Type:     _X__PT   ___OT   ___SLP Medical Diagnosis:  post L5 vertebroplasty, mm spasm     PT Diagnosis:  LE weakness, abnormal gait, hx of falls   Visits from SOC:  1      _________________________________________________________________________________  Plan of Treatment/Functional Goals:  strengthening, gait training, balance training      Goals     Goal Description: pt will increase walking to 4-5blocks to demonstrate improved strength and decrease fall risk in 6wk  Target Date: 08/24/18     Goal Description: pt will be able to carry 5# item/grocery bag up front steps io n6wk  Target Date: 08/24/18  Therapy Frequency:  1 time/week  Predicted Duration of Therapy Intervention:  6wks    Kris Hoenk, PT                 I CERTIFY THE NEED FOR THESE SERVICES FURNISHED UNDER        THIS PLAN OF TREATMENT AND WHILE UNDER MY CARE .    Physician Signature               Date    X_____________________________________________________                     Certification Date From:  07/13/18   Certification Date To:  08/24/18    Referring Provider:  Dr Ruiz/Dr. Lechuga    Initial Assessment        See Epic Evaluation Start of Care Date: 07/13/18
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
## 2018-07-17 ENCOUNTER — HOSPITAL ENCOUNTER (OUTPATIENT)
Dept: PHYSICAL THERAPY | Facility: CLINIC | Age: 68
Setting detail: THERAPIES SERIES
End: 2018-07-17
Attending: PHYSICAL MEDICINE & REHABILITATION
Payer: MEDICARE

## 2018-07-17 ENCOUNTER — TRANSFERRED RECORDS (OUTPATIENT)
Dept: HEALTH INFORMATION MANAGEMENT | Facility: CLINIC | Age: 68
End: 2018-07-17

## 2018-07-17 PROCEDURE — 40000185 ZZHC STATISTIC PT OUTPT VISIT

## 2018-07-17 PROCEDURE — 97110 THERAPEUTIC EXERCISES: CPT | Mod: GP

## 2018-07-19 ENCOUNTER — HOSPITAL ENCOUNTER (OUTPATIENT)
Dept: PHYSICAL THERAPY | Facility: CLINIC | Age: 68
Setting detail: THERAPIES SERIES
End: 2018-07-19
Attending: PHYSICAL MEDICINE & REHABILITATION
Payer: MEDICARE

## 2018-07-19 PROCEDURE — 97110 THERAPEUTIC EXERCISES: CPT | Mod: GP

## 2018-07-19 PROCEDURE — 40000185 ZZHC STATISTIC PT OUTPT VISIT

## 2018-07-24 ENCOUNTER — HOSPITAL ENCOUNTER (OUTPATIENT)
Dept: PHYSICAL THERAPY | Facility: CLINIC | Age: 68
Setting detail: THERAPIES SERIES
End: 2018-07-24
Attending: PHYSICAL MEDICINE & REHABILITATION
Payer: MEDICARE

## 2018-07-24 PROCEDURE — 97110 THERAPEUTIC EXERCISES: CPT | Mod: GP | Performed by: PHYSICAL THERAPIST

## 2018-07-24 PROCEDURE — 40000718 ZZHC STATISTIC PT DEPARTMENT ORTHO VISIT: Performed by: PHYSICAL THERAPIST

## 2018-07-26 ENCOUNTER — HOSPITAL ENCOUNTER (OUTPATIENT)
Dept: PHYSICAL THERAPY | Facility: CLINIC | Age: 68
Setting detail: THERAPIES SERIES
End: 2018-07-26
Attending: PHYSICAL MEDICINE & REHABILITATION
Payer: MEDICARE

## 2018-07-26 PROCEDURE — 97112 NEUROMUSCULAR REEDUCATION: CPT | Mod: GP | Performed by: PHYSICAL THERAPIST

## 2018-07-26 PROCEDURE — 97110 THERAPEUTIC EXERCISES: CPT | Mod: GP | Performed by: PHYSICAL THERAPIST

## 2018-07-26 PROCEDURE — 40000718 ZZHC STATISTIC PT DEPARTMENT ORTHO VISIT: Performed by: PHYSICAL THERAPIST

## 2018-08-01 ENCOUNTER — HOSPITAL ENCOUNTER (OUTPATIENT)
Dept: MAMMOGRAPHY | Facility: CLINIC | Age: 68
Discharge: HOME | End: 2018-08-01
Attending: INTERNAL MEDICINE | Admitting: INTERNAL MEDICINE
Payer: MEDICARE

## 2018-08-01 DIAGNOSIS — Z12.31 VISIT FOR SCREENING MAMMOGRAM: ICD-10-CM

## 2018-08-01 PROCEDURE — 77067 SCR MAMMO BI INCL CAD: CPT

## 2018-08-30 ENCOUNTER — OFFICE VISIT (OUTPATIENT)
Dept: PODIATRY | Facility: CLINIC | Age: 68
End: 2018-08-30
Payer: COMMERCIAL

## 2018-08-30 VITALS — BODY MASS INDEX: 23.05 KG/M2 | WEIGHT: 135 LBS | HEART RATE: 75 BPM | HEIGHT: 64 IN

## 2018-08-30 DIAGNOSIS — B35.1 ONYCHOMYCOSIS OF TOENAIL: Primary | ICD-10-CM

## 2018-08-30 PROCEDURE — 11721 DEBRIDE NAIL 6 OR MORE: CPT | Performed by: PODIATRIST
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
## 2018-08-30 NOTE — MR AVS SNAPSHOT
"              After Visit Summary   8/30/2018    Nora Alarcon    MRN: [id number]           Patient Information     Date Of Birth          1950        Visit Information        Provider Department      8/30/2018 1:20 PM Ar Swain DPM [facility]        Today's Diagnoses     Onychomycosis of toenail    -  1       Follow-ups after your visit        Follow-up notes from your care team     Return if symptoms worsen or fail to improve.      Who to contact     If you have questions or need follow up information about today's clinic visit or your schedule please contact [facility] directly at [phone].  Normal or non-critical lab and imaging results will be communicated to you by MyChart, letter or phone within 4 business days after the clinic has received the results. If you do not hear from us within 7 days, please contact the clinic through MyChart or phone. If you have a critical or abnormal lab result, we will notify you by phone as soon as possible.  Submit refill requests through [facility] or call your pharmacy and they will forward the refill request to us. Please allow 3 business days for your refill to be completed.          Additional Information About Your Visit        Care EveryWhere ID     This is your Care EveryWhere ID. This could be used by other organizations to access your [place] medical records  [id number]        Your Vitals Were     Pulse Height BMI (Body Mass Index)             75 5' 4\" (1.626 m) 23.17 kg/m2          Blood Pressure from Last 3 Encounters:   07/09/18 164/81   06/28/18 (P) 131/78   04/24/18 141/52    Weight from Last 3 Encounters:   08/30/18 135 lb (61.2 kg)   07/09/18 135 lb (61.2 kg)   06/28/18 134 lb (60.8 kg)              We Performed the Following     DEBRIDEMENT OF NAILS, 6 OR MORE        Primary Care Provider Office Phone # Fax #    Brandie Hill [phone]       " [address] [facility] [address]        Equal Access to Services     BONNIE BUTLER : Hadii aad ku hadambrosiojono [place], wagenesisda maralkiraha, qaadalmayo kakerivirgie black, hang holdergriffindoreen chaudhary. So [facility] [phone].    ATENCIÓN: Si habla español, tiene a galaviz disposición servicios gratuitos de asistencia lingüística. Llame al [phone].    We comply with applicable federal civil rights laws and [place] laws. We do not discriminate on the basis of race, color, national origin, age, disability, sex, sexual orientation, or gender identity.            Thank you!     Thank you for choosing [facility]  for your care. Our goal is always to provide you with excellent care. Hearing back from our patients is one way we can continue to improve our services. Please take a few minutes to complete the written survey that you may receive in the mail after your visit with us. Thank you!             Your Updated Medication List - Protect others around you: Learn how to safely use, store and throw away your medicines at www.disposemymeds.org.          This list is accurate as of 8/30/18 11:59 PM.  Always use your most recent med list.                   Brand Name Dispense Instructions for use Diagnosis    albuterol 108 (90 Base) MCG/ACT inhaler    PROAIR HFA/PROVENTIL HFA/VENTOLIN HFA    1 Inhaler    Inhale 2 puffs into the lungs every 6 hours as needed for shortness of breath / dyspnea or wheezing    Acute bronchitis with symptoms greater than 10 days       ASPIRIN PO      Take 325 mg by mouth daily        DEPAKOTE PO      Take 500 mg by mouth 2 times daily        MIRTAZAPINE PO      Take 45 mg by mouth At Bedtime        order for DME     2 each    Equipment being ordered: bilateral below the knee LILIBETH stockings    Bilateral leg edema       simvastatin 10 MG tablet    ZOCOR    90 tablet    TAKE 1 TABLET BY MOUTH AT BEDTIME    Hyperlipidemia LDL goal <130       TYLENOL PO      Take 1,000  mg by mouth 2 times daily as needed for mild pain or fever        TYMLOS 3120 MCG/1.56ML Sopn injection   Generic drug:  Abaloparatide      Inject 80 mcg Subcutaneous daily
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
## 2018-09-04 NOTE — ADDENDUM NOTE
Encounter addended by: Hoenk, Kris, PT on: 9/4/2018 10:32 AM<BR>     Actions taken: Sign clinical note, Flowsheet accepted, Episode resolved

## 2018-09-04 NOTE — PROGRESS NOTES
"  Nora Alarcon   PHYSICAL THERAPY DISCHARGE  07/26/18 1100   Signing Clinician's Name / Credentials   Signing clinician's name / credentials Kris Hoenk, PT   Session Number   Session Number 5 , seen from 7/13/18-7/26/18   Progress Note/Recertification  Medicare G-code:  Patient did not attend a final scheduled session prior to discharge.  Unable to determine discharge functional status.     Recertification Due Date 08/24/18   Ortho Goal 1   Goal Description pt will increase walking to 4-5blocks to demonstrate improved strength and decrease fall risk in 6wk   Target Date 08/24/18   Ortho Goal 2   Goal Description pt will be able to carry 5# item/grocery bag up front steps io n6wk   Target Date 08/24/18   Subjective Report   Subjective Report walking about 4 block but using walker since she fell, normally uses walking sticks prior to her fall, going same distance she did prior   Treatment Interventions   Interventions Therapeutic Procedure/Exercise;Neuromuscular Re-education   Therapeutic Procedure/exercise   Minutes 10   Skilled Intervention progression of strength ex program forHEP, improve walking, stairs   Patient Response inc reps, walking better/steadier   Treatment Detail walking 400ft w/o asst device for endurance, SLR x15B, bridge x15 - cues to lift as high as she can   Neuromuscular Re-education   Minutes 20   Skilled Intervention balance drills   Patient Response SLS 5sec at best   Treatment Detail balance tracking ball in diagonals x5 B, VOR cx following ball x10 - difficult time following ball with eyes, standing FA6\" EO horiz head turns x10, vert x10, FA6\" EC in place , foam EO 30sec, foam EC 10 sec x2, marching not holding on x10 B, fwd march x20ft, SLS several trials B   Plan   Homework encouraged pt to try walking with her walking sticks again   Plan for next session pt is doing 2x/wk, likes consistency  Patient had much improved in walking and stability.  Patient failed to schedule further " appts./did follow through with therapy as recommended.  Final status not known   Total Session Time   Timed Code Treatment Minutes 30   Total Treatment Time (sum of timed and untimed services) 30   Kris Hoenk, PT #[id number]  [facility]

## 2018-09-14 ENCOUNTER — OFFICE VISIT (OUTPATIENT)
Dept: FAMILY MEDICINE | Facility: CLINIC | Age: 68
End: 2018-09-14
Payer: COMMERCIAL

## 2018-09-14 VITALS
WEIGHT: 129 LBS | OXYGEN SATURATION: 97 % | SYSTOLIC BLOOD PRESSURE: 138 MMHG | BODY MASS INDEX: 22.14 KG/M2 | HEART RATE: 77 BPM | TEMPERATURE: 99.2 F | DIASTOLIC BLOOD PRESSURE: 78 MMHG

## 2018-09-14 DIAGNOSIS — M54.50 LEFT-SIDED LOW BACK PAIN WITHOUT SCIATICA, UNSPECIFIED CHRONICITY: Primary | ICD-10-CM

## 2018-09-14 DIAGNOSIS — E78.5 HYPERLIPIDEMIA LDL GOAL <130: ICD-10-CM

## 2018-09-14 DIAGNOSIS — I10 ESSENTIAL HYPERTENSION WITH GOAL BLOOD PRESSURE LESS THAN 140/90: ICD-10-CM

## 2018-09-14 DIAGNOSIS — R60.0 BILATERAL LEG EDEMA: ICD-10-CM

## 2018-09-14 DIAGNOSIS — Z91.81 RISK FOR FALLS: ICD-10-CM

## 2018-09-14 DIAGNOSIS — Z23 NEED FOR PROPHYLACTIC VACCINATION AND INOCULATION AGAINST INFLUENZA: ICD-10-CM

## 2018-09-14 DIAGNOSIS — Z23 NEED FOR VACCINATION: ICD-10-CM

## 2018-09-14 DIAGNOSIS — F31.0 BIPOLAR AFFECTIVE DISORDER, CURRENT EPISODE HYPOMANIC (H): ICD-10-CM

## 2018-09-14 PROBLEM — R79.89 ELEVATED SERUM CREATININE: Status: RESOLVED | Noted: 2017-05-01 | Resolved: 2018-09-14

## 2018-09-14 PROCEDURE — 99214 OFFICE O/P EST MOD 30 MIN: CPT | Mod: 25 | Performed by: INTERNAL MEDICINE

## 2018-09-14 PROCEDURE — 90732 PPSV23 VACC 2 YRS+ SUBQ/IM: CPT | Performed by: INTERNAL MEDICINE

## 2018-09-14 PROCEDURE — G0008 ADMIN INFLUENZA VIRUS VAC: HCPCS | Performed by: INTERNAL MEDICINE

## 2018-09-14 PROCEDURE — G0009 ADMIN PNEUMOCOCCAL VACCINE: HCPCS | Performed by: INTERNAL MEDICINE

## 2018-09-14 PROCEDURE — 90662 IIV NO PRSV INCREASED AG IM: CPT | Performed by: INTERNAL MEDICINE

## 2018-09-14 RX ORDER — SIMVASTATIN 10 MG
10 TABLET ORAL AT BEDTIME
Qty: 90 TABLET | Refills: 3 | Status: SHIPPED | OUTPATIENT
Start: 2018-09-14 | End: 2019-09-16

## 2018-09-14 NOTE — PATIENT INSTRUCTIONS
1. I am referring you to [facility] [place] for depression; please make your initial appointment for 1 hour.  The phone number is [phone] or you can schedule at the  on your way out.  2. Referral to more physical therapy  3. Labs today

## 2018-09-14 NOTE — MR AVS SNAPSHOT
After Visit Summary   9/14/2018    Nora Alarcon    MRN: [id number]           Patient Information     Date Of Birth          1950        Visit Information        Provider Department      9/14/2018 2:00 PM Brandie Hill, DO [facility]        Today's Diagnoses     Left-sided low back pain without sciatica, unspecified chronicity    -  1    Hyperlipidemia LDL goal <130        Bipolar affective disorder, current episode hypomanic (H)        Risk for falls        Need for prophylactic vaccination and inoculation against influenza        Essential hypertension with goal blood pressure less than 140/90          Care Instructions    1. I am referring you to [facility] [place] for depression; please make your initial appointment for 1 hour.  The phone number is [phone] or you can schedule at the  on your way out.  2. Referral to more physical therapy  3. Labs today              Follow-ups after your visit        Additional Services     PHYSICAL THERAPY REFERRAL       *This therapy referral will be filtered to a centralized scheduling office at [facility] and the patient will receive a call to schedule an appointment at a [place] location most convenient for them. *     [facility] provides Physical Therapy evaluation and treatment and many specialty services across the [place] system.  If requesting a specialty program, please choose from the list below.    If you have not heard from the scheduling office within 2 business days, please call [phone] for all locations, with the exception of [place], please call [phone] and [facility], please call [phone]  Treatment: Evaluation & Treatment  Special Instructions/Modalities:   Special Programs:     Please be aware that coverage of these services is subject to the terms and limitations of your health insurance plan.  Call member  "services at your health plan with any benefit or coverage questions.      **Note to Provider:  If you are referring outside of [facility] for the therapy appointment, please list the name of the location in the \"special instructions\" above, print the referral and give to the patient to schedule the appointment.                  Who to contact     If you have questions or need follow up information about today's clinic visit or your schedule please contact [facility] directly at [phone].  Normal or non-critical lab and imaging results will be communicated to you by MyChart, letter or phone within 4 business days after the clinic has received the results. If you do not hear from us within 7 days, please contact the clinic through MyChart or phone. If you have a critical or abnormal lab result, we will notify you by phone as soon as possible.  Submit refill requests through [employer] or call your pharmacy and they will forward the refill request to us. Please allow 3 business days for your refill to be completed.          Additional Information About Your Visit        Care EveryWhere ID     This is your Care EveryWhere ID. This could be used by other organizations to access your [facility] medical records  [id number]        Your Vitals Were     Pulse Temperature Pulse Oximetry Breastfeeding? BMI (Body Mass Index)       77 99.2  F (37.3  C) (Tympanic) 97% No 22.14 kg/m2        Blood Pressure from Last 3 Encounters:   09/14/18 138/78   07/09/18 164/81   06/28/18 (P) 131/78    Weight from Last 3 Encounters:   09/14/18 129 lb (58.5 kg)   08/30/18 135 lb (61.2 kg)   07/09/18 135 lb (61.2 kg)              We Performed the Following     CBC with platelets     Comprehensive metabolic panel     FLU VACCINE, INCREASED ANTIGEN, PRESV FREE, AGE 65+ [[id number]]     Lipid Profile (Chol, Trig, HDL, LDL calc)     PHYSICAL THERAPY REFERRAL     Vaccine Administration, Initial [[id number]]          Today's Medication Changes        "   These changes are accurate as of 9/14/18  2:11 PM.  If you have any questions, ask your nurse or doctor.               These medicines have changed or have updated prescriptions.        Dose/Directions    simvastatin 10 MG tablet   Commonly known as:  ZOCOR   This may have changed:  See the new instructions.   Used for:  Hyperlipidemia LDL goal <130   Changed by:  Brandie Hill DO        Dose:  10 mg   Take 1 tablet (10 mg) by mouth At Bedtime   Quantity:  90 tablet   Refills:  3            Where to get your medicines      These medications were sent to [id number] - [place], [place] - [address]  [address], [place] [address]     Phone:  [phone]     simvastatin 10 MG tablet                Primary Care Provider Office Phone # Fax #    Brandie Hill [phone] [address] [address]        Equal Access to Services     [facility] : Hadii paxton ku hadasho Soomaali, waaxda luqadaha, qaybta kaalmada adeegyada, waxay jerryin hayalinan nicole [place] . So [facility] [place] [facility] [phone].    ATENCIÓN: Si habla español, tiene a galaviz disposición servicios gratuitos de asistencia lingüística. Llame al [phone].    We comply with applicable federal civil rights laws and [place] laws. We do not discriminate on the basis of race, color, national origin, age, disability, sex, sexual orientation, or gender identity.            Thank you!     Thank you for choosing [facility]  for your care. Our goal is always to provide you with excellent care. Hearing back from our patients is one way we can continue to improve our services. Please take a few minutes to complete the written survey that you may receive in the mail after your visit with us. Thank you!             Your Updated Medication List - Protect others around you: Learn how to safely use, store and throw away your medicines at www.disposemymeds.org.          This list  is accurate as of 9/14/18  2:11 PM.  Always use your most recent med list.                   Brand Name Dispense Instructions for use Diagnosis    ASPIRIN PO      Take 325 mg by mouth daily        DEPAKOTE PO      Take 500 mg by mouth 2 times daily        MIRTAZAPINE PO      Take 45 mg by mouth At Bedtime        order for DME     2 each    Equipment being ordered: bilateral below the knee LILIBETH stockings    Bilateral leg edema       simvastatin 10 MG tablet    ZOCOR    90 tablet    Take 1 tablet (10 mg) by mouth At Bedtime    Hyperlipidemia LDL goal <130       TYLENOL PO      Take 1,000 mg by mouth 2 times daily as needed for mild pain or fever        TYMLOS 3120 MCG/1.56ML Sopn injection   Generic drug:  Abaloparatide      Inject 80 mcg Subcutaneous daily

## 2018-09-17 DIAGNOSIS — E78.5 HYPERLIPIDEMIA LDL GOAL <130: ICD-10-CM

## 2018-09-17 DIAGNOSIS — I10 ESSENTIAL HYPERTENSION WITH GOAL BLOOD PRESSURE LESS THAN 140/90: ICD-10-CM

## 2018-09-17 LAB
ALBUMIN SERPL-MCNC: 3.4 G/DL (ref 3.4–5)
ALP SERPL-CCNC: 52 U/L (ref 40–150)
ALT SERPL W P-5'-P-CCNC: 16 U/L (ref 0–50)
ANION GAP SERPL CALCULATED.3IONS-SCNC: 2 MMOL/L (ref 3–14)
AST SERPL W P-5'-P-CCNC: 19 U/L (ref 0–45)
BILIRUB SERPL-MCNC: 0.4 MG/DL (ref 0.2–1.3)
BUN SERPL-MCNC: 26 MG/DL (ref 7–30)
CALCIUM SERPL-MCNC: 8.4 MG/DL (ref 8.5–10.1)
CHLORIDE SERPL-SCNC: 106 MMOL/L (ref 94–109)
CHOLEST SERPL-MCNC: 164 MG/DL
CO2 SERPL-SCNC: 30 MMOL/L (ref 20–32)
CREAT SERPL-MCNC: 0.8 MG/DL (ref 0.52–1.04)
ERYTHROCYTE [DISTWIDTH] IN BLOOD BY AUTOMATED COUNT: 13.2 % (ref 10–15)
GFR SERPL CREATININE-BSD FRML MDRD: 71 ML/MIN/1.7M2
GLUCOSE SERPL-MCNC: 72 MG/DL (ref 70–99)
HCT VFR BLD AUTO: 40.6 % (ref 35–47)
HDLC SERPL-MCNC: 82 MG/DL
HGB BLD-MCNC: 13.3 G/DL (ref 11.7–15.7)
LDLC SERPL CALC-MCNC: 68 MG/DL
MCH RBC QN AUTO: 34.1 PG (ref 26.5–33)
MCHC RBC AUTO-ENTMCNC: 32.8 G/DL (ref 31.5–36.5)
MCV RBC AUTO: 104 FL (ref 78–100)
NONHDLC SERPL-MCNC: 82 MG/DL
PLATELET # BLD AUTO: 157 10E9/L (ref 150–450)
POTASSIUM SERPL-SCNC: 4.6 MMOL/L (ref 3.4–5.3)
PROT SERPL-MCNC: 7.7 G/DL (ref 6.8–8.8)
RBC # BLD AUTO: 3.9 10E12/L (ref 3.8–5.2)
SODIUM SERPL-SCNC: 138 MMOL/L (ref 133–144)
TRIGL SERPL-MCNC: 69 MG/DL
WBC # BLD AUTO: 3.6 10E9/L (ref 4–11)

## 2018-09-17 PROCEDURE — 36415 COLL VENOUS BLD VENIPUNCTURE: CPT | Performed by: INTERNAL MEDICINE

## 2018-09-17 PROCEDURE — 85027 COMPLETE CBC AUTOMATED: CPT | Performed by: INTERNAL MEDICINE

## 2018-09-17 PROCEDURE — 80053 COMPREHEN METABOLIC PANEL: CPT | Performed by: INTERNAL MEDICINE

## 2018-09-17 PROCEDURE — 80061 LIPID PANEL: CPT | Performed by: INTERNAL MEDICINE

## 2018-09-21 ENCOUNTER — TELEPHONE (OUTPATIENT)
Dept: FAMILY MEDICINE | Facility: CLINIC | Age: 68
End: 2018-09-21

## 2018-09-21 NOTE — TELEPHONE ENCOUNTER
Form from  [employer]-Order clarification. Orders were signed, faxed and sent to scanning.    [facility]

## 2018-09-25 ENCOUNTER — OFFICE VISIT (OUTPATIENT)
Dept: FAMILY MEDICINE | Facility: CLINIC | Age: 68
End: 2018-09-25
Payer: COMMERCIAL

## 2018-09-25 VITALS
DIASTOLIC BLOOD PRESSURE: 78 MMHG | HEART RATE: 73 BPM | TEMPERATURE: 98.2 F | WEIGHT: 130.38 LBS | HEIGHT: 64 IN | SYSTOLIC BLOOD PRESSURE: 128 MMHG | BODY MASS INDEX: 22.26 KG/M2 | RESPIRATION RATE: 16 BRPM | OXYGEN SATURATION: 98 %

## 2018-09-25 DIAGNOSIS — N39.44 NOCTURNAL ENURESIS: Primary | ICD-10-CM

## 2018-09-25 PROCEDURE — 99214 OFFICE O/P EST MOD 30 MIN: CPT | Performed by: INTERNAL MEDICINE

## 2018-09-25 RX ORDER — TOLTERODINE 2 MG/1
2 CAPSULE, EXTENDED RELEASE ORAL DAILY
Qty: 90 CAPSULE | Refills: 3 | Status: SHIPPED | OUTPATIENT
Start: 2018-09-25 | End: 2018-09-25

## 2018-09-25 RX ORDER — TOLTERODINE 2 MG/1
2 CAPSULE, EXTENDED RELEASE ORAL DAILY
Qty: 90 CAPSULE | Refills: 3 | Status: SHIPPED | OUTPATIENT
Start: 2018-09-25 | End: 2019-07-25

## 2018-09-25 NOTE — PATIENT INSTRUCTIONS
1. Start Detrol 2 mg at night.  If not effective after 1-2 weeks, call Dr. Hill and we can increase dose          Treating Incontinence in Women: Nonsurgical Methods    The best treatment for you will depend on the type of incontinence you have. Your symptoms, age, and any underlying problems that are found also affect your treatment. While some types of incontinence may eventually require surgery, nonsurgical treatments may be effective in many cases. Nonsurgical treatments include lifestyle changes, muscle-strengthening exercises, and medicines.  Nonsurgical Treatments  Treatment for stress urinary incontinence includes:    Bladder training    Lifestyle changes such as weight loss and increased activity if incontinence is due to being overweight    Medicines, if bladder training has not helped    Pelvic floor muscle exercises  Lifestyle changes    Losing weight. Excess weight puts extra pressure on the pelvic floor muscles. Exercising and eating right can help you lose weight. This helps other treatments work better.    Making certain diet changes. Some foods may make you need to urinate more, so it may be good to avoid them. These include caffeinated drinks and alcohol. Ask your healthcare provider whether these or other diet changes might be helpful.    Quitting smoking. Smoking can lead to a chronic cough that strains pelvic floor muscles. Smoking may also damage the bladder and urethra.  Pelvic floor muscle exercises  There are exercises you can do to help strengthen your pelvic floor muscles. The pelvic floor muscles act as a sling to help hold the bladder and urethra in place. These muscles also help keep the urethra closed. Weak pelvic floor muscles may allow urine to leak. To strengthen the pelvic floor muscles, do the exercises daily. In a few months, the muscles will be stronger and tighter. This can help prevent urine leakage.  Date Last Reviewed: 1/1/2017 2000-2017 [employer] [address]  [place] [address]. All rights reserved. This information is not intended as a substitute for professional medical care. Always follow your healthcare professional's instructions.        Kegel Exercises  Kegel exercises don t need special clothing or equipment. They re easy to learn and simple to do. And if you do them right, no one can tell you re doing them, so they can be done almost anywhere. Your healthcare provider, nurse, or physical therapist can answer any questions you have and help you get started.    A weak pelvic floor  The pelvic floor muscles may weaken due to aging, pregnancy and vaginal childbirth, injury, surgery, chronic cough, or lack of exercise. If the pelvic floor is weak, your bladder and other pelvic organs may sag out of place. The urethra may also open too easily and allow urine to leak out. Kegel exercises can help you strengthen your pelvic floor muscles. Then they can better support the pelvic organs and control urine flow.  How Kegel exercises are done  Try each of the Kegel exercises described below. When you re doing them, try not to move your leg, buttock, or stomach muscles:    Contract as if you were stopping your urine stream. But do it when you re not urinating.    Tighten your rectum as if trying not to pass gas. Contract your anus, but don t move your buttocks.    You may place a finger or 2 in the vagina and squeeze your finger with your vagina to learn which muscles to tighten.  Try to hold each Kegel for a slow count to 5. You probably won t be able to hold them for that long at first. But keep practicing. It will get easier as your pelvic floor gets stronger. Eventually, special weights that you place in your vagina may be recommended to help make your Kegels even more effective. Visit your healthcare provider if you have difficulties doing Kegel exercises.  Helpful hints  Here are some tips to follow:    Do your Kegels as often as you can. The more you  do them, the faster you ll feel the results.    Pick an activity you do often as a reminder. For instance, do your Kegels every time you sit down.    Tighten your pelvic floor before you sneeze, get up from a chair, cough, laugh, or lift. This protects your pelvic floor from injury and can help prevent urine leakage.   Date Last Reviewed: 10/1/2017    5357-9241 [employer]. [address] [place] [address], [place] [address]. All rights reserved. This information is not intended as a substitute for professional medical care. Always follow your healthcare professional's instructions.
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
## 2018-09-25 NOTE — MR AVS SNAPSHOT
After Visit Summary   9/25/2018    Nora Alarcon    MRN: [id number]           Patient Information     Date Of Birth          1950        Visit Information        Provider Department      9/25/2018 7:20 AM Brandie Hill, DO [facility]        Today's Diagnoses     Nocturnal enuresis    -  1      Care Instructions    1. Start Detrol 2 mg at night.  If not effective after 1-2 weeks, call Dr. Hill and we can increase dose          Treating Incontinence in Women: Nonsurgical Methods    The best treatment for you will depend on the type of incontinence you have. Your symptoms, age, and any underlying problems that are found also affect your treatment. While some types of incontinence may eventually require surgery, nonsurgical treatments may be effective in many cases. Nonsurgical treatments include lifestyle changes, muscle-strengthening exercises, and medicines.  Nonsurgical Treatments  Treatment for stress urinary incontinence includes:    Bladder training    Lifestyle changes such as weight loss and increased activity if incontinence is due to being overweight    Medicines, if bladder training has not helped    Pelvic floor muscle exercises  Lifestyle changes    Losing weight. Excess weight puts extra pressure on the pelvic floor muscles. Exercising and eating right can help you lose weight. This helps other treatments work better.    Making certain diet changes. Some foods may make you need to urinate more, so it may be good to avoid them. These include caffeinated drinks and alcohol. Ask your healthcare provider whether these or other diet changes might be helpful.    Quitting smoking. Smoking can lead to a chronic cough that strains pelvic floor muscles. Smoking may also damage the bladder and urethra.  Pelvic floor muscle exercises  There are exercises you can do to help strengthen your pelvic floor muscles. The pelvic floor muscles act as a sling to help hold the  bladder and urethra in place. These muscles also help keep the urethra closed. Weak pelvic floor muscles may allow urine to leak. To strengthen the pelvic floor muscles, do the exercises daily. In a few months, the muscles will be stronger and tighter. This can help prevent urine leakage.  Date Last Reviewed: 1/1/2017 2000-2017 [employer]. [address] [place] [address]. All rights reserved. This information is not intended as a substitute for professional medical care. Always follow your healthcare professional's instructions.        Kegel Exercises  Kegel exercises don t need special clothing or equipment. They re easy to learn and simple to do. And if you do them right, no one can tell you re doing them, so they can be done almost anywhere. Your healthcare provider, nurse, or physical therapist can answer any questions you have and help you get started.    A weak pelvic floor  The pelvic floor muscles may weaken due to aging, pregnancy and vaginal childbirth, injury, surgery, chronic cough, or lack of exercise. If the pelvic floor is weak, your bladder and other pelvic organs may sag out of place. The urethra may also open too easily and allow urine to leak out. Kegel exercises can help you strengthen your pelvic floor muscles. Then they can better support the pelvic organs and control urine flow.  How Kegel exercises are done  Try each of the Kegel exercises described below. When you re doing them, try not to move your leg, buttock, or stomach muscles:    Contract as if you were stopping your urine stream. But do it when you re not urinating.    Tighten your rectum as if trying not to pass gas. Contract your anus, but don t move your buttocks.    You may place a finger or 2 in the vagina and squeeze your finger with your vagina to learn which muscles to tighten.  Try to hold each Kegel for a slow count to 5. You probably won t be able to hold them for that long at first. But keep  practicing. It will get easier as your pelvic floor gets stronger. Eventually, special weights that you place in your vagina may be recommended to help make your Kegels even more effective. Visit your healthcare provider if you have difficulties doing Kegel exercises.  Helpful hints  Here are some tips to follow:    Do your Kegels as often as you can. The more you do them, the faster you ll feel the results.    Pick an activity you do often as a reminder. For instance, do your Kegels every time you sit down.    Tighten your pelvic floor before you sneeze, get up from a chair, cough, laugh, or lift. This protects your pelvic floor from injury and can help prevent urine leakage.   Date Last Reviewed: 10/1/2017    7833-4515 [employer]. [address] [place] [address], [place] [address]. All rights reserved. This information is not intended as a substitute for professional medical care. Always follow your healthcare professional's instructions.                Follow-ups after your visit        Who to contact     If you have questions or need follow up information about today's clinic visit or your schedule please contact [facility] directly at [phone].  Normal or non-critical lab and imaging results will be communicated to you by MyChart, letter or phone within 4 business days after the clinic has received the results. If you do not hear from us within 7 days, please contact the clinic through MyChart or phone. If you have a critical or abnormal lab result, we will notify you by phone as soon as possible.  Submit refill requests through [employer] or call your pharmacy and they will forward the refill request to us. Please allow 3 business days for your refill to be completed.          Additional Information About Your Visit        Care EveryWhere ID     This is your Care EveryWhere ID. This could be used by other organizations to access your [place] medical records  [id number]        Your  "Vitals Were     Pulse Temperature Respirations Height Pulse Oximetry BMI (Body Mass Index)    73 98.2  F (36.8  C) (Tympanic) 16 5' 4\" (1.626 m) 98% 22.38 kg/m2       Blood Pressure from Last 3 Encounters:   09/25/18 128/78   09/14/18 138/78   07/09/18 164/81    Weight from Last 3 Encounters:   09/25/18 130 lb 6 oz (59.1 kg)   09/14/18 129 lb (58.5 kg)   08/30/18 135 lb (61.2 kg)              Today, you had the following     No orders found for display         Today's Medication Changes          These changes are accurate as of 9/25/18  8:23 AM.  If you have any questions, ask your nurse or doctor.               Start taking these medicines.        Dose/Directions    tolterodine 2 MG 24 hr capsule   Commonly known as:  DETROL LA   Used for:  Nocturnal enuresis   Started by:  Brandie Hill DO        Dose:  2 mg   Take 1 capsule (2 mg) by mouth daily   Quantity:  90 capsule   Refills:  3            Where to get your medicines      These medications were sent to [facility] [address] [facility]  [address] [facility] [address]     Phone:  [phone]     tolterodine 2 MG 24 hr capsule                Primary Care Provider Office Phone # Fax #    Brandie Hill [phone]       [address] [facility] [address]        Equal Access to Services     BONNIE BUTLER AH: Hadii paxton limao Somayuriali, waaxda luqadaha, qaybta kaalmada adeegyada, waxay everardo garcia adeyolanda chaudhary. So [facility] [phone].    ATENCIÓN: Si habla español, tiene a galaviz disposición servicios gratuitos de asistencia lingüística. Llmadalyn al [phone].    We comply with applicable federal civil rights laws and [place] laws. We do not discriminate on the basis of race, color, national origin, age, disability, sex, sexual orientation, or gender identity.            Thank you!     Thank you for choosing [facility]  for your care. Our goal is always to provide you with " excellent care. Hearing back from our patients is one way we can continue to improve our services. Please take a few minutes to complete the written survey that you may receive in the mail after your visit with us. Thank you!             Your Updated Medication List - Protect others around you: Learn how to safely use, store and throw away your medicines at www.disposemymeds.org.          This list is accurate as of 9/25/18  8:23 AM.  Always use your most recent med list.                   Brand Name Dispense Instructions for use Diagnosis    ASPIRIN PO      Take 325 mg by mouth daily        DEPAKOTE PO      Take 500 mg by mouth 2 times daily        MIRTAZAPINE PO      Take 45 mg by mouth At Bedtime        order for DME     2 each    Equipment being ordered: bilateral below the knee LILIBETH stockings    Bilateral leg edema       simvastatin 10 MG tablet    ZOCOR    90 tablet    Take 1 tablet (10 mg) by mouth At Bedtime    Hyperlipidemia LDL goal <130       tolterodine 2 MG 24 hr capsule    DETROL LA    90 capsule    Take 1 capsule (2 mg) by mouth daily    Nocturnal enuresis       TYLENOL PO      Take 1,000 mg by mouth 2 times daily as needed for mild pain or fever        TYMLOS 3120 MCG/1.56ML Sopn injection   Generic drug:  Abaloparatide      Inject 80 mcg Subcutaneous daily

## 2018-10-01 ENCOUNTER — TELEPHONE (OUTPATIENT)
Dept: FAMILY MEDICINE | Facility: CLINIC | Age: 68
End: 2018-10-01

## 2018-10-01 NOTE — TELEPHONE ENCOUNTER
Reason for Call:  Other orders    Detailed comments: Patient is asking for a  test done.    Phone Number Patient can be reached at: Home number on file [phone] (home)    Best Time: any    Can we leave a detailed message on this number? YES    Call taken on 10/1/2018 at 8:46 AM by Leslie Madrigal

## 2018-10-01 NOTE — TELEPHONE ENCOUNTER
"Patient is requesting lab CA-125. She states she wants to check for ovarian cancer. She states she has discussed her concern previously and thought she would be tested for this. She states she has discomfort and frequent UTI and wondering if this is related. She states she had pelvic x-ray but \"nothing showed up\".     Office visit? LOV 09/25/18. Order as requested?      Alina NAM RN    "

## 2018-10-02 NOTE — TELEPHONE ENCOUNTER
The CA-125 is not an ovarian cancer screening test.  It should only be ordered if there is a ovarian mass seen on imaging.  If she wants to discuss further, I am happy to see her in clinic

## 2018-10-10 ENCOUNTER — OFFICE VISIT (OUTPATIENT)
Dept: FAMILY MEDICINE | Facility: CLINIC | Age: 68
End: 2018-10-10
Payer: COMMERCIAL

## 2018-10-10 VITALS
RESPIRATION RATE: 14 BRPM | TEMPERATURE: 98.6 F | HEART RATE: 83 BPM | OXYGEN SATURATION: 97 % | BODY MASS INDEX: 22.49 KG/M2 | SYSTOLIC BLOOD PRESSURE: 124 MMHG | WEIGHT: 131 LBS | DIASTOLIC BLOOD PRESSURE: 76 MMHG

## 2018-10-10 DIAGNOSIS — Z80.41 FAMILY HISTORY OF MALIGNANT NEOPLASM OF OVARY: ICD-10-CM

## 2018-10-10 DIAGNOSIS — N39.44 NOCTURNAL ENURESIS: ICD-10-CM

## 2018-10-10 DIAGNOSIS — F31.0 BIPOLAR AFFECTIVE DISORDER, CURRENT EPISODE HYPOMANIC (H): Primary | ICD-10-CM

## 2018-10-10 PROCEDURE — 99213 OFFICE O/P EST LOW 20 MIN: CPT | Performed by: INTERNAL MEDICINE

## 2018-10-10 NOTE — PROGRESS NOTES
"  SUBJECTIVE:   Nora Alarcon is a 67 year old female who presents to clinic today for the following health issues:  Chief Complaint   Patient presents with     Lab Only      CA-125      Dry Mouth         Lab request - telephone call on 10/1 \"Patient is requesting lab CA-125. She states she wants to check for ovarian cancer. She states she has discussed her concern previously and thought she would be tested for this. She states she has discomfort and frequent UTI and wondering if this is related. She states she had pelvic x-ray but \"nothing showed up\". \"    She reports her cousin and her aunt  of ovarian cancer and her family told her to be tested for ovarian cancer.  She had a CA-125 test in the years ago and it was mildly elevated.  She is very worried she has ovarian cancer.    Left hip/back pain:  Is stabbing and severe at times.    Mental Health: we have discussed counseling but she wants to discuss with her long time psychiatrist first.    Urine symptoms:  detrol Helping but has dry mouth      Current Outpatient Prescriptions   Medication Sig Dispense Refill     Acetaminophen (TYLENOL PO) Take 1,000 mg by mouth 2 times daily as needed for mild pain or fever        ASPIRIN PO Take 325 mg by mouth daily        Divalproex Sodium (DEPAKOTE PO) Take 500 mg by mouth 2 times daily       MIRTAZAPINE PO Take 45 mg by mouth At Bedtime       order for DME Equipment being ordered: bilateral below the knee LILIBETH stockings 2 each 2     simvastatin (ZOCOR) 10 MG tablet Take 1 tablet (10 mg) by mouth At Bedtime 90 tablet 3     tolterodine (DETROL LA) 2 MG 24 hr capsule Take 1 capsule (2 mg) by mouth daily 90 capsule 3     TYMLOS 3120 MCG/1.56ML SOPN injection Inject 80 mcg Subcutaneous daily          Reviewed and updated as needed this visit by clinical staff  Tobacco  Allergies  Med Hx  Surg Hx  Fam Hx  Soc Hx      Reviewed and updated as needed this visit by Provider         ROS:  Constitutional, HEENT, " cardiovascular, pulmonary, gi and gu systems are negative, except as otherwise noted.    OBJECTIVE:     /76  Pulse 83  Temp 98.6  F (37  C) (Tympanic)  Resp 14  Wt 131 lb (59.4 kg)  SpO2 97%  Breastfeeding? No  BMI 22.49 kg/m2  Body mass index is 22.49 kg/(m^2).  GENERAL APPEARANCE: alert and no distress  PSYCH: mentation appears normal, anxious, crying and worried      ASSESSMENT/PLAN:       ICD-10-CM    1. Bipolar affective disorder, current episode hypomanic (H) F31.0    2. Family history of malignant neoplasm of ovary Z80.41    3. Nocturnal enuresis N39.44        Patient Instructions   1. Talk with your psychiatrist about counseling  2. No need for lab testing  3. Repeat DEXA in 2021      Brandie Hill, [facility]

## 2018-10-10 NOTE — MR AVS SNAPSHOT
After Visit Summary   10/10/2018    Nora Alarcon    MRN: [id number]           Patient Information     Date Of Birth          1950        Visit Information        Provider Department      10/10/2018 12:40 PM Brandie Hill DO [facility]        Care Instructions    1. Talk with your psychiatrist about counseling  2. No need for lab testing  3. Repeat DEXA in 2021          Follow-ups after your visit        Who to contact     If you have questions or need follow up information about today's clinic visit or your schedule please contact [facility] directly at [phone].  Normal or non-critical lab and imaging results will be communicated to you by MyChart, letter or phone within 4 business days after the clinic has received the results. If you do not hear from us within 7 days, please contact the clinic through MyChart or phone. If you have a critical or abnormal lab result, we will notify you by phone as soon as possible.  Submit refill requests through [employer] or call your pharmacy and they will forward the refill request to us. Please allow 3 business days for your refill to be completed.          Additional Information About Your Visit        Care EveryWhere ID     This is your Care EveryWhere ID. This could be used by other organizations to access your [place] medical records  [id number]        Your Vitals Were     Pulse Temperature Respirations Pulse Oximetry Breastfeeding? BMI (Body Mass Index)    83 98.6  F (37  C) (Tympanic) 14 97% No 22.49 kg/m2       Blood Pressure from Last 3 Encounters:   10/10/18 124/76   09/25/18 128/78   09/14/18 138/78    Weight from Last 3 Encounters:   10/10/18 131 lb (59.4 kg)   09/25/18 130 lb 6 oz (59.1 kg)   09/14/18 129 lb (58.5 kg)              Today, you had the following     No orders found for display       Primary Care Provider Office Phone # Fax #    Brandie Hill [phone]  [facility] [address]        Equal Access to Services     BONNIE BUTLER : Hadii aad ku hadambrosiojono Somayuriali, waaxda luqadaha, qaybta kaalmada sofia, hang chaudhary. So [facility] [phone].    ATENCIÓN: Si habla español, tiene a galaviz disposición servicios gratuitos de asistencia lingüística. Llame al [phone].    We comply with applicable federal civil rights laws and [place] laws. We do not discriminate on the basis of race, color, national origin, age, disability, sex, sexual orientation, or gender identity.            Thank you!     Thank you for choosing [facility]  for your care. Our goal is always to provide you with excellent care. Hearing back from our patients is one way we can continue to improve our services. Please take a few minutes to complete the written survey that you may receive in the mail after your visit with us. Thank you!             Your Updated Medication List - Protect others around you: Learn how to safely use, store and throw away your medicines at www.disposemymeds.org.          This list is accurate as of 10/10/18  1:02 PM.  Always use your most recent med list.                   Brand Name Dispense Instructions for use Diagnosis    ASPIRIN PO      Take 325 mg by mouth daily        DEPAKOTE PO      Take 500 mg by mouth 2 times daily        MIRTAZAPINE PO      Take 45 mg by mouth At Bedtime        order for DME     2 each    Equipment being ordered: bilateral below the knee LILIBETH stockings    Bilateral leg edema       simvastatin 10 MG tablet    ZOCOR    90 tablet    Take 1 tablet (10 mg) by mouth At Bedtime    Hyperlipidemia LDL goal <130       tolterodine 2 MG 24 hr capsule    DETROL LA    90 capsule    Take 1 capsule (2 mg) by mouth daily    Nocturnal enuresis       TYLENOL PO      Take 1,000 mg by mouth 2 times daily as needed for mild pain or fever        TYMLOS 3120 MCG/1.56ML Sopn injection   Generic drug:  Abaloparatide      Inject  80 mcg Subcutaneous daily

## 2018-10-17 ENCOUNTER — HOSPITAL ENCOUNTER (OUTPATIENT)
Dept: PHYSICAL THERAPY | Facility: CLINIC | Age: 68
Setting detail: THERAPIES SERIES
End: 2018-10-17
Attending: INTERNAL MEDICINE
Payer: MEDICARE

## 2018-10-17 PROCEDURE — 97110 THERAPEUTIC EXERCISES: CPT | Mod: GP | Performed by: PHYSICAL THERAPIST

## 2018-10-17 PROCEDURE — 97161 PT EVAL LOW COMPLEX 20 MIN: CPT | Mod: GP | Performed by: PHYSICAL THERAPIST

## 2018-10-17 PROCEDURE — G8979 MOBILITY GOAL STATUS: HCPCS | Mod: GP,CI | Performed by: PHYSICAL THERAPIST

## 2018-10-17 PROCEDURE — G8978 MOBILITY CURRENT STATUS: HCPCS | Mod: GP | Performed by: PHYSICAL THERAPIST

## 2018-10-17 PROCEDURE — 40000718 ZZHC STATISTIC PT DEPARTMENT ORTHO VISIT: Performed by: PHYSICAL THERAPIST

## 2018-10-17 NOTE — PROGRESS NOTES
[facility]          OUTPATIENT PHYSICAL THERAPY ORTHOPEDIC EVALUATION  PLAN OF TREATMENT FOR OUTPATIENT REHABILITATION  (COMPLETE FOR INITIAL CLAIMS ONLY)  Patient's Last Name, First Name, M.I.  YOB: 1950  Nora Alarcon       Provider s Name:  [facility]   Medical Record No.  [id number]   Start of Care Date:  10/17/18   Onset Date:  03/08/18   Type:     _X__PT   ___OT   ___SLP Medical Diagnosis:  L LBP     PT Diagnosis:  LE weakenss, LBP, poor posture   Visits from SOC:  1      _________________________________________________________________________________  Plan of Treatment/Functional Goals:  strengthening, stretching, manual therapy           Goals     Goal Description: pt will be able to transition sup to sit or up from chair without L LBP in 6wk  Target Date: 11/28/18       Goal Description: pt indep with home strengthenign program for self management of sx and continued fall reduction in 6wk  Target Date: 11/28/18        Therapy Frequency:  1 time/week  Predicted Duration of Therapy Intervention:  6wks    Kris Hoenk, PT                 I CERTIFY THE NEED FOR THESE SERVICES FURNISHED UNDER        THIS PLAN OF TREATMENT AND WHILE UNDER MY CARE     (Physician co-signature of this document indicates review and certification of the therapy plan).                       Certification Date From:  10/17/18   Certification Date To:  11/28/18    Referring Provider:  DR. Hill    Initial Assessment        See Epic Evaluation Start of Care Date: 10/17/18

## 2018-10-17 NOTE — PROGRESS NOTES
Nora Alarcon   PHYSICAL THERAPY EVALUATION    10/17/18 0900   General Information   Type of Visit Initial OP Ortho PT Evaluation   Start of Care Date 10/17/18   Referring Physician DR. Hill   Patient/Family Goals Statement less back pain   Orders Evaluate and Treat   Date of Order 10/10/18   Insurance Type Medicare   Medical Diagnosis LBP, L hip pain   Surgical/Medical history reviewed Yes  (bipolar, depression, arthritis)   Body Part(s)   Body Part(s) Lumbar Spine/SI   Presentation and Etiology   Pertinent history of current problem (include personal factors and/or comorbidities that impact the POC) March 2018 L5 vertebroplasty.  L LBP ongoing, fell this past July landing on her face.  was seen in PT at that time.  No falls since then.  Sx increase first get out of bed, first getting walking.  Once she is moving it gets better.  Was able to help get her dock in this year.  HAs been doing some of her exercises not all.  Not using anything to walk with in house, using walking sticks for daily walks.  Comes here todayusing 4WW, says was easier on the bus and hangs her coat on it.      Onset date of current episode/exacerbation 03/08/18   Prior Level of Function   Functional Level Prior Comment housework, cook, clean, vacuum   Current Level of Function   Patient role/employment history F. Retired   Living environment [place]   Fall Risk Screen   Fall screen completed by PT   Have you fallen 2 or more times in the past year? Yes   Have you fallen and had an injury in the past year? Yes   Timed Up and Go score (seconds) 11.63   Is patient a fall risk? No   Lumbar Spine/SI Objective Findings   Posture fwd bent posture, wider than normal base of support, decreased lumbar lordosis, fwd head, upper thoracic kyphosis, L iliac crest slightly higher, nearing contact with lateral ribs,    Gait/Locomotion slightly fwd bent, wider than normal base of support   Flexion ROM 75%,    Extension ROM 0 - unable to attain  full upright posture, decreased lordosis and tight ant hip   Right Side Bending ROM 50%   Left Side Bending ROM 10% mostly forward flexes   Lumbar ROM Comment did not like prone lying, tolerated approx 2 minutes   Pelvic Screen L ASIS higher/R lower   Hip Screen L hip ER 20*, R 40*   Hip Flexion (L2) Strength 4   Hip Abduction Strength 4-   Knee Flexion Strength 5   Knee Extension (L3) Strength R5, L 4+   Ankle Dorsiflexion (L4) Strength 5   Hip Flexor Flexibility 0* B, potentially less than 0*   SLR neg   Palpation tender lateral iliac crest when in standing, in R SL, no tenderess reported, until asked at L SI area   Quadricep Flexibility prone knee flex 90* B   Crossover SLR neg   Planned Therapy Interventions   Planned Therapy Interventions strengthening;stretching;manual therapy   Clinical Impression   Criteria for Skilled Therapeutic Interventions Met yes, treatment indicated   PT Diagnosis LE weakenss, LBP, poor posture   Functional limitations due to impairments lifting, bending, first geting out of bed or up from chair   Clinical Presentation Stable/Uncomplicated   Clinical Decision Making (Complexity) Low complexity   Therapy Frequency 1 time/week   Predicted Duration of Therapy Intervention (days/wks) 6wks   Risk & Benefits of therapy have been explained Yes   Patient, Family & other staff in agreement with plan of care Yes   Education Assessment   Preferred Learning Style Listening;Pictures/video   Barriers to Learning No barriers   Ortho Goal 1   Goal Description pt will be able to transition sup to sit or up from chair without L LBP in 6wk   Target Date 11/28/18   Ortho Goal 2   Goal Description pt indep with home strengthenign program for self management of sx and continued fall reduction in 6wk   Target Date 11/28/18   Total Evaluation Time   Total Evaluation Time 20   Therapy Certification   Certification date from 10/17/18   Certification date to 11/28/18   Medical Diagnosis L LBP   Kris Hoenk, PT  #[id number]  [facility]

## 2018-10-24 ENCOUNTER — HOSPITAL ENCOUNTER (OUTPATIENT)
Dept: PHYSICAL THERAPY | Facility: CLINIC | Age: 68
Setting detail: THERAPIES SERIES
End: 2018-10-24
Attending: INTERNAL MEDICINE
Payer: MEDICARE

## 2018-10-24 PROCEDURE — 97140 MANUAL THERAPY 1/> REGIONS: CPT | Mod: GP | Performed by: PHYSICAL THERAPIST

## 2018-10-24 PROCEDURE — 97110 THERAPEUTIC EXERCISES: CPT | Mod: GP | Performed by: PHYSICAL THERAPIST

## 2018-10-24 PROCEDURE — 40000718 ZZHC STATISTIC PT DEPARTMENT ORTHO VISIT: Performed by: PHYSICAL THERAPIST

## 2018-11-27 ENCOUNTER — HOSPITAL ENCOUNTER (OUTPATIENT)
Dept: PHYSICAL THERAPY | Facility: CLINIC | Age: 68
Setting detail: THERAPIES SERIES
End: 2018-11-27
Attending: INTERNAL MEDICINE
Payer: MEDICARE

## 2018-11-27 PROCEDURE — 97110 THERAPEUTIC EXERCISES: CPT | Mod: GP | Performed by: PHYSICAL THERAPIST

## 2018-11-27 PROCEDURE — 40000718 ZZHC STATISTIC PT DEPARTMENT ORTHO VISIT: Performed by: PHYSICAL THERAPIST

## 2018-11-27 PROCEDURE — G8978 MOBILITY CURRENT STATUS: HCPCS | Mod: GP,CI | Performed by: PHYSICAL THERAPIST

## 2018-11-27 PROCEDURE — G8979 MOBILITY GOAL STATUS: HCPCS | Mod: GP,CI | Performed by: PHYSICAL THERAPIST

## 2018-11-27 PROCEDURE — 97140 MANUAL THERAPY 1/> REGIONS: CPT | Mod: GP | Performed by: PHYSICAL THERAPIST

## 2018-11-27 NOTE — PROGRESS NOTES
RECERTIFICATION    Nora Alarcon  1950    Session Number: 3 MC low reva since start of care.    Reasons for Continuing Treatment:   See below in note    Frequency/Duration  1 times per every other week for 5 weeks for a total of 3 visits.    Recertification Period  11/28/18 - 1/5/2019    Physician Signature:    Date:    X_______________________________________________________    Physician Name: DR. MANDO Hill,     I certify the need for these services furnished under this plan of treatment and while under my care. Physician co-signature of this document indicates review and certification of the therapy plan.  This signature may be written on paper, or electronically signed within [facility].        PHYSICAL THERAPY PROGRESS NOTE  11/27/18 1100   Signing Clinician's Name / Credentials   Signing clinician's name / credentials Kris Hoenk, PT   Session Number   Session Number 3 MC low reva   Progress Note/Recertification   Recertification Due Date 11/28/18   Recertification Completed Date  11/27/18   Mobility: Walking & Moving Around   Mobility Current Status,  (eval/re-eval & every progress note) CI: 1-19% impairment   Current Mobility Modifier Rationale mild pain on stairs, bed mobility   Mobility Goal,  (eval/re-eval, every progress note, & discharge) CI: 1-19% impairment   Adult Goals   PT Ortho Eval Goals 1;2   Ortho Goal 1   Goal Identifier still valid extend date   Goal Description pt will be able to transition sup to sit or up from chair without L LBP in 6wk  (up from chair ok, get out of bed little sore)   Target Date 12/28/18   Ortho Goal 2   Goal Description pt indep with home strengthenign program for self management of sx and continued fall reduction in 6wk  (knows her exercises, cont to progress)   Target Date 12/28/18   Ortho Goal 3   Goal Identifier new 11/27/18   Goal Description will be able to do reciprocol stairs on consistent basis in 4wk   Target Date 12/28/18   Subjective Report    Subjective Report was out of town twice, LB/hip did well, L side of hip always hurts, points to just below iliac crest on L as site of pain   Objective Measure 1   Details LROM WFL for pt, R post/L ant ilium, hip abd 4+/5 - much stronger   Therapeutic Procedure/exercise   Minutes 15   Skilled Intervention review, condense HEP   Patient Response understood   Treatment Detail SL hip abd L x10, x8, LTRS, hip flexor off bed, seated flex/R ROT stretch   Manual Therapy   Minutes 15   Skilled Intervention MET, jt mob for pain, alignment   Patient Response looser after   Treatment Detail MET pube clearing, R post/L ant ilium, maual post rocking L ilium   Plan   Plan for next session pt would like to return 1-2 more times, larger gap in plan than she intended, cont mild sx L lateral hip/iliac crest, will again for pelvic alignment, strengthening   Total Session Time   Timed Code Treatment Minutes 30   Total Treatment Time (sum of timed and untimed services) 30   Kris Hoenk, PT #[id number]  [facility]

## 2018-12-10 ENCOUNTER — TELEPHONE (OUTPATIENT)
Dept: AUDIOLOGY | Facility: CLINIC | Age: 68
End: 2018-12-10

## 2018-12-10 NOTE — TELEPHONE ENCOUNTER
Reviewed indicator lights and use of push button with patient. She expressed understanding.     Angelina Trent, CCC-A  Licensed Audiologist #[id number]  12/10/2018

## 2018-12-10 NOTE — TELEPHONE ENCOUNTER
Reason for Call:  Other     Detailed comments: Pt has a question about turning her hearing aids on - cannot tell if they are off or on. - Please advise    Phone Number Patient can be reached at: Home number on file [phone] (home) or [phone] (cell)    Best Time: Any    Can we leave a detailed message on this number? NO    Call taken on 12/10/2018 at 8:45 AM by Denise Behrendt

## 2018-12-19 ENCOUNTER — HOSPITAL ENCOUNTER (OUTPATIENT)
Dept: PHYSICAL THERAPY | Facility: CLINIC | Age: 68
Setting detail: THERAPIES SERIES
End: 2018-12-19
Attending: INTERNAL MEDICINE
Payer: MEDICARE

## 2018-12-19 ENCOUNTER — OFFICE VISIT (OUTPATIENT)
Dept: DERMATOLOGY | Facility: CLINIC | Age: 68
End: 2018-12-19
Payer: COMMERCIAL

## 2018-12-19 ENCOUNTER — TELEPHONE (OUTPATIENT)
Dept: DERMATOLOGY | Facility: CLINIC | Age: 68
End: 2018-12-19

## 2018-12-19 VITALS — OXYGEN SATURATION: 99 % | DIASTOLIC BLOOD PRESSURE: 74 MMHG | HEART RATE: 79 BPM | SYSTOLIC BLOOD PRESSURE: 134 MMHG

## 2018-12-19 DIAGNOSIS — L81.4 LENTIGO: ICD-10-CM

## 2018-12-19 DIAGNOSIS — C44.329 SQUAMOUS CELL CANCER OF SKIN OF JAWLINE: ICD-10-CM

## 2018-12-19 DIAGNOSIS — L82.1 SEBORRHEIC KERATOSIS: Primary | ICD-10-CM

## 2018-12-19 PROCEDURE — 97110 THERAPEUTIC EXERCISES: CPT | Mod: GP | Performed by: PHYSICAL THERAPIST

## 2018-12-19 PROCEDURE — 99214 OFFICE O/P EST MOD 30 MIN: CPT | Mod: 25 | Performed by: DERMATOLOGY

## 2018-12-19 PROCEDURE — 11100 HC BIOPSY SKIN/SUBQ/MUC MEM, SINGLE LESION: CPT | Performed by: DERMATOLOGY

## 2018-12-19 PROCEDURE — 88331 PATH CONSLTJ SURG 1 BLK 1SPC: CPT | Performed by: DERMATOLOGY

## 2018-12-19 NOTE — TELEPHONE ENCOUNTER
Pt advised of this result and scheduled for excision on 01-28-19 with Dr. Rivas.    Marva Colin  [facility] RN

## 2018-12-19 NOTE — NURSING NOTE
"Initial /74   Pulse 79   SpO2 99%  Estimated body mass index is 22.49 kg/m  as calculated from the following:    Height as of 9/25/18: 1.626 m (5' 4\").    Weight as of 10/10/18: 59.4 kg (131 lb). .    Hayley Bautista LPN    "

## 2018-12-19 NOTE — PATIENT INSTRUCTIONS
Our number is [phone] and ask to speak with a dermatology nurse for results.      Wound Care Instructions     FOR SUPERFICIAL WOUNDS     [facility] [phone]    [facility] [phone]    Right jawline                       AFTER 24 HOURS YOU SHOULD REMOVE THE BANDAGE AND BEGIN DAILY DRESSING CHANGES AS FOLLOWS:     1) Remove Dressing.     2) Clean and dry the area with tap water using a Q-tip or sterile gauze pad.     3) Apply Vaseline, Aquaphor, Polysporin ointment or Bacitracin ointment over entire wound.  Do NOT use Neosporin ointment.     4) Cover the wound with a band-aid, or a sterile non-stick gauze pad and micropore paper tape      REPEAT THESE INSTRUCTIONS AT LEAST ONCE A DAY UNTIL THE WOUND HAS COMPLETELY HEALED.    It is an old wives tale that a wound heals better when it is exposed to air and allowed to dry out. The wound will heal faster with a better cosmetic result if it is kept moist with ointment and covered with a bandage.    **Do not let the wound dry out.**      Supplies Needed:      *Cotton tipped applicators (Q-tips)    *Polysporin Ointment or Bacitracin Ointment (NOT NEOSPORIN)    *Band-aids or non-stick gauze pads and micropore paper tape.      PATIENT INFORMATION:    During the healing process you will notice a number of changes. All wounds develop a small halo of redness surrounding the wound.  This means healing is occurring. Severe itching with extensive redness usually indicates sensitivity to the ointment or bandage tape used to dress the wound.  You should call our office if this develops.      Swelling  and/or discoloration around your surgical site is common, particularly when performed around the eye.    All wounds normally drain.  The larger the wound the more drainage there will be.  After 7-10 days, you will notice the wound beginning to shrink and new skin will begin to grow.  The wound is healed when you can see skin has formed over the  entire area.  A healed wound has a healthy, shiny look to the surface and is red to dark pink in color to normalize.  Wounds may take approximately 4-6 weeks to heal.  Larger wounds may take 6-8 weeks.  After the wound is healed you may discontinue dressing changes.    You may experience a sensation of tightness as your wound heals. This is normal and will gradually subside.    Your healed wound may be sensitive to temperature changes. This sensitivity improves with time, but if you re having a lot of discomfort, try to avoid temperature extremes.    Patients frequently experience itching after their wound appears to have healed because of the continue healing under the skin.  Plain Vaseline will help relieve the itching.        POSSIBLE COMPLICATIONS    BLEEDIN. Leave the bandage in place.  2. Use tightly rolled up gauze or a cloth to apply direct pressure over the bandage for 30  minutes.  3. Reapply pressure for an additional 30 minutes if necessary  4. Use additional gauze and tape to maintain pressure once the bleeding has stopped.
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
## 2018-12-19 NOTE — TELEPHONE ENCOUNTER
----- Message from Armando Rivas MD sent at 12/19/2018 12:16 PM CST -----  r jawline squamous cell carcinoma schedule excision

## 2019-01-04 ENCOUNTER — HOSPITAL ENCOUNTER (EMERGENCY)
Facility: CLINIC | Age: 69
Discharge: HOME | End: 2019-01-04
Attending: PHYSICIAN ASSISTANT | Admitting: PHYSICIAN ASSISTANT
Payer: MEDICARE

## 2019-01-04 VITALS
HEIGHT: 66 IN | OXYGEN SATURATION: 99 % | TEMPERATURE: 98.4 F | DIASTOLIC BLOOD PRESSURE: 88 MMHG | SYSTOLIC BLOOD PRESSURE: 154 MMHG | RESPIRATION RATE: 18 BRPM | HEART RATE: 83 BPM | BODY MASS INDEX: 20.89 KG/M2 | WEIGHT: 130 LBS

## 2019-01-04 DIAGNOSIS — N30.01 ACUTE CYSTITIS WITH HEMATURIA: ICD-10-CM

## 2019-01-04 DIAGNOSIS — N89.8 VAGINAL DISCHARGE: ICD-10-CM

## 2019-01-04 LAB
ALBUMIN UR-MCNC: NEGATIVE MG/DL
APPEARANCE UR: ABNORMAL
BILIRUB UR QL STRIP: NEGATIVE
COLOR UR AUTO: YELLOW
GLUCOSE UR STRIP-MCNC: NEGATIVE MG/DL
HGB UR QL STRIP: ABNORMAL
KETONES UR STRIP-MCNC: NEGATIVE MG/DL
LEUKOCYTE ESTERASE UR QL STRIP: ABNORMAL
NITRATE UR QL: NEGATIVE
PH UR STRIP: 7 PH (ref 5–7)
RBC #/AREA URNS AUTO: 10 /HPF (ref 0–2)
SOURCE: ABNORMAL
SP GR UR STRIP: 1.02 (ref 1–1.03)
SQUAMOUS #/AREA URNS AUTO: <1 /HPF (ref 0–1)
UROBILINOGEN UR STRIP-MCNC: 2 MG/DL (ref 0–2)
WBC #/AREA URNS AUTO: >182 /HPF (ref 0–5)
WBC CLUMPS #/AREA URNS HPF: PRESENT /HPF

## 2019-01-04 PROCEDURE — 87186 SC STD MICRODIL/AGAR DIL: CPT | Performed by: PHYSICIAN ASSISTANT

## 2019-01-04 PROCEDURE — 81001 URINALYSIS AUTO W/SCOPE: CPT | Performed by: EMERGENCY MEDICINE

## 2019-01-04 PROCEDURE — G0463 HOSPITAL OUTPT CLINIC VISIT: HCPCS

## 2019-01-04 PROCEDURE — 87088 URINE BACTERIA CULTURE: CPT | Performed by: PHYSICIAN ASSISTANT

## 2019-01-04 PROCEDURE — 87086 URINE CULTURE/COLONY COUNT: CPT | Performed by: PHYSICIAN ASSISTANT

## 2019-01-04 PROCEDURE — 99213 OFFICE O/P EST LOW 20 MIN: CPT | Performed by: PHYSICIAN ASSISTANT

## 2019-01-04 RX ORDER — SULFAMETHOXAZOLE/TRIMETHOPRIM 800-160 MG
1 TABLET ORAL 2 TIMES DAILY
Qty: 14 TABLET | Refills: 0 | Status: SHIPPED | OUTPATIENT
Start: 2019-01-04 | End: 2019-06-26

## 2019-01-04 ASSESSMENT — ENCOUNTER SYMPTOMS
VOMITING: 0
FEVER: 0
CONSTIPATION: 0
ABDOMINAL PAIN: 0
DYSURIA: 1
BACK PAIN: 0
NAUSEA: 0
FREQUENCY: 1
ABDOMINAL DISTENTION: 0

## 2019-01-04 ASSESSMENT — MIFFLIN-ST. JEOR: SCORE: 1131.43

## 2019-01-04 NOTE — DISCHARGE INSTRUCTIONS
Use Medication as directed    Patient advised to call for any lab results (if obtained during visit) within 2-3 days. Urine culture pending.   Drink plenty of fluids.     Prevention and treatment of UTI's discussed.  Signs and symptoms of pyelonephritis mentioned. (back pain, vomiting, abdominal pain, fevers, or nausea occur)   Return to clinic for recheck of urine in 1 weeks to confirm resolution of UTI and to have pelvic exam if symptoms persist.   Patient to return to clinic sooner if not improving as expected.   Go to ER if any worsening symptoms or signs/symptoms of phylonephritis occur.

## 2019-01-04 NOTE — ED AVS SNAPSHOT
[facility] Emergency Department  [address] [address]  Phone:  [phone]  Fax:  [phone]                                    Nora Alarcon   MRN: [id number]    Department:  [facility] Emergency Department   Date of Visit:  1/4/2019           After Visit Summary Signature Page    I have received my discharge instructions, and my questions have been answered. I have discussed any challenges I see with this plan with the nurse or doctor.    ..........................................................................................................................................  Patient/Patient Representative Signature      ..........................................................................................................................................  Patient Representative Print Name and Relationship to Patient    ..................................................               ................................................  Date                                   Time    ..........................................................................................................................................  Reviewed by Signature/Title    ...................................................              ..............................................  Date                                               Time          22EPIC Rev 08/18
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
## 2019-01-06 LAB
BACTERIA SPEC CULT: ABNORMAL
BACTERIA SPEC CULT: ABNORMAL
Lab: ABNORMAL
SPECIMEN SOURCE: ABNORMAL

## 2019-01-07 NOTE — RESULT ENCOUNTER NOTE
Final Urine Culture Report on 1/6/18  Emergency Dept/Urgent Care discharge antibiotic prescribed: Sulfamethoxazole-Trimethoprim (Bactrim DS, Septra DS) 800-160 mg PO tablet,  1 tablet by mouth 2 times daily for 7 days.  #1. Bacteria, 50,000 to 100,000 colonies/ml Escherichia coli, is SUSCEPTIBLE to Antibiotic.    As per [facility] ED Lab Result protocol, no change in antibiotic therapy.

## 2019-01-25 ENCOUNTER — OFFICE VISIT (OUTPATIENT)
Dept: FAMILY MEDICINE | Facility: CLINIC | Age: 69
End: 2019-01-25
Payer: MEDICARE

## 2019-01-25 VITALS
DIASTOLIC BLOOD PRESSURE: 78 MMHG | BODY MASS INDEX: 22.11 KG/M2 | WEIGHT: 137.6 LBS | HEIGHT: 66 IN | SYSTOLIC BLOOD PRESSURE: 138 MMHG | TEMPERATURE: 99 F | HEART RATE: 99 BPM | OXYGEN SATURATION: 98 % | RESPIRATION RATE: 16 BRPM

## 2019-01-25 DIAGNOSIS — J06.9 VIRAL URI: Primary | ICD-10-CM

## 2019-01-25 PROCEDURE — 99213 OFFICE O/P EST LOW 20 MIN: CPT | Performed by: NURSE PRACTITIONER

## 2019-01-25 ASSESSMENT — MIFFLIN-ST. JEOR: SCORE: 1165.9
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
## 2019-01-25 NOTE — PATIENT INSTRUCTIONS

## 2019-01-25 NOTE — ADDENDUM NOTE
Encounter addended by: Hoenk, Kris, PT on: 1/25/2019 9:04 AM   Actions taken: Flowsheet data copied forward, Sign clinical note, Flowsheet accepted, Episode resolved
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
## 2019-01-28 ENCOUNTER — OFFICE VISIT (OUTPATIENT)
Dept: DERMATOLOGY | Facility: CLINIC | Age: 69
End: 2019-01-28
Payer: MEDICARE

## 2019-01-28 ENCOUNTER — TELEPHONE (OUTPATIENT)
Dept: AUDIOLOGY | Facility: CLINIC | Age: 69
End: 2019-01-28

## 2019-01-28 VITALS — HEART RATE: 83 BPM | DIASTOLIC BLOOD PRESSURE: 86 MMHG | SYSTOLIC BLOOD PRESSURE: 146 MMHG | OXYGEN SATURATION: 98 %

## 2019-01-28 DIAGNOSIS — C44.329 SQUAMOUS CELL CANCER OF SKIN OF JAWLINE: Primary | ICD-10-CM

## 2019-01-28 PROCEDURE — 17312 MOHS ADDL STAGE: CPT | Performed by: DERMATOLOGY

## 2019-01-28 PROCEDURE — 13132 CMPLX RPR F/C/C/M/N/AX/G/H/F: CPT | Mod: 51 | Performed by: DERMATOLOGY

## 2019-01-28 PROCEDURE — 17311 MOHS 1 STAGE H/N/HF/G: CPT | Performed by: DERMATOLOGY

## 2019-01-28 NOTE — TELEPHONE ENCOUNTER
Reason for Call:  Other     Detailed comments: Pt says her rt hearing aid is broke - wires are disconnected. Wants to know if she can drop it off to be repaired - Please advise    Phone Number Patient can be reached at: Cell number on file:    Telephone Information:   Mobile [phone]     Home #:  [phone]    Best Time: Any    Can we leave a detailed message on this number? YES    Call taken on 1/28/2019 at 1:54 PM by Denise Behrendt
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
## 2019-01-28 NOTE — NURSING NOTE
"Initial /86   Pulse 83   SpO2 98%  Estimated body mass index is 22.21 kg/m  as calculated from the following:    Height as of 1/25/19: 1.676 m (5' 6\").    Weight as of 1/25/19: 62.4 kg (137 lb 9.6 oz). .      "
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
## 2019-01-28 NOTE — PATIENT INSTRUCTIONS
Sutured Wound Care     [facility]: [phone]    [facility]: [phone]  Right jawline          ? No strenuous activity for 48 hours. Resume moderate activity in 48 hours. No heavy exercising until you are seen for follow up in one week.     ? Take Tylenol as needed for discomfort.                         ? Do not drink alcoholic beverages for 48 hours.     ? Keep the pressure bandage in place for 24 hours. If the bandage becomes blood tinged or loose, reinforce it with gauze and tape.        (Refer to the reverse side of this page for management of bleeding).    ? Remove pressure bandage in 24 hours     ? Leave the flat bandage in place until your follow up appointment.    ? Keep the bandage dry. Wash around it carefully.    ? If the tape becomes soiled or starts to come off, reinforce it with additional paper tape.    ? Do not smoke for 3 weeks; smoking is detrimental to wound healing.    ? It is normal to have swelling and bruising around the surgical site. The bruising will fade in approximately 10-14 days. Elevate the area to reduce swelling.    ? Numbness, itchiness and sensitivity to temperature changes can occur after surgery and may take up to 18 months to normalize.      POSSIBLE COMPLICATIONS    BLEEDIN. Leave the bandage in place.  2. Use tightly rolled up gauze or a cloth to apply direct pressure over the bandage for 20   minutes.  3. Reapply pressure for an additional 20 minutes if necessary  4. Call the office or go to the nearest emergency room if pressure fails to stop the bleeding.  5. Use additional gauze and tape to maintain pressure once the bleeding has stopped.        PAIN:    1. Post operative pain should slowly get better, never worse.  2. A severe increase in pain may indicate a problem. Call the office if this occurs.    In case of emergency phone:Dr Rivas [phone]

## 2019-01-28 NOTE — TELEPHONE ENCOUNTER
Patient reports her right hearing aid is broken. Gave patient the option to drop it off or come in and see me. Appointment scheduled for 2/4/2019 at 1:00.

## 2019-02-04 ENCOUNTER — OFFICE VISIT (OUTPATIENT)
Dept: AUDIOLOGY | Facility: CLINIC | Age: 69
End: 2019-02-04
Payer: MEDICARE

## 2019-02-04 ENCOUNTER — ALLIED HEALTH/NURSE VISIT (OUTPATIENT)
Dept: DERMATOLOGY | Facility: CLINIC | Age: 69
End: 2019-02-04
Payer: MEDICARE

## 2019-02-04 DIAGNOSIS — H90.3 SENSORINEURAL HEARING LOSS, BILATERAL: Primary | ICD-10-CM

## 2019-02-04 DIAGNOSIS — Z48.01 ENCOUNTER FOR CHANGE OR REMOVAL OF SURGICAL WOUND DRESSING: Primary | ICD-10-CM

## 2019-02-04 PROCEDURE — V5299 HEARING SERVICE: HCPCS | Performed by: AUDIOLOGIST

## 2019-02-04 PROCEDURE — 99207 ZZC NO CHARGE NURSE ONLY: CPT

## 2019-02-04 PROCEDURE — 99207 ZZC NO CHARGE LOS: CPT | Performed by: AUDIOLOGIST

## 2019-02-04 NOTE — PROGRESS NOTES
[facility]         SUBJECTIVE:  Nora Alarcon ,68 year old female comes in for in office repair of her 2017 Phonak Audeo B50-R hearing aids. She reports the right  wire has broken.     OBJECTIVE:  Replaced right #1 xS  and medium closed dome. Verified hearing aid functionality.      ASSESSMENT/PLAN:    Discussed in office repair and warranty expiration (2/5/2020) with patient. Return to clinic as needed.    Kathe Sneed M.A. F-[facility], #[id number]

## 2019-02-04 NOTE — PATIENT INSTRUCTIONS
WOUND CARE INSTRUCTIONS  for  ONE WEEK AFTER SURGERY                Right jawline      1) Leave flat bandage on your skin for one week after today s bandage change.  2) In one week when you remove the bandage, you may resume your regular skin care routine, including washing with mild soap and water, applying moisturizer, make-up and sunscreen.    3) If there are any open or bleeding areas at the incision/graft site you should begin to cover the area with a bandage daily as follows:    1) Clean and dry the area with plain tap water using a Q-tip or sterile gauze pad.  2) Apply Polysporin or Bacitracin ointment to the open area.  3) Cover the wound with a band-aid or a sterile non-stick gauze pad and micropore paper tape.         SIGNS OF INFECTION  - If you notice any of these signs of infection, call your doctor right away: expanding redness around the wound.  - Yellow or greenish-colored pus or cloudy wound drainage.    - Red streaking spreading from the wound.  - Increased swelling, tenderness, or pain around the wound.   - Fever.    Please remember that yellow and clear drainage from a wound can be normal and related to normal wound healing.  Isolated drainage from a wound without a combination of the above features does not indicate infection.       *Once the bandages are removed, the scar will be red and firm (especially in the lip/chin area). This is normal and will fade in time. It might take 6-12 months for this to happen.     *Massaging the area will help the scar soften and fade quicker. Begin to massage the area one month after the bandages have been removed. To massage apply pressure directly and firmly over the scar with the fingertips and move in a circular motion. Massage the area for a few minutes several times a day. Continue to massage the site for several months.    *Approximately 6-8 weeks after surgery it is not uncommon to see the formation of  tender pimple-like  bump along the scar. This is  normal. As the scar continues to mature and the stitches underneath the skin begin to dissolve, this might occur. Do not pick or squeeze, this will resolve on it s own. Should one break open producing a small amount of drainage, apply Polysporin or Bacitracin ointment a few times a day until the wound is completely healed.    *Numbness in the surgical area is expected. It might take 12-18 months for the feeling to return to normal. During this time sensations of itchiness, tingling and occasional sharp pains might be noted. These feelings are normal and will subside once the nerves have completely healed.         IN CASE OF EMERGENCY: Dr Rivas [phone]     If you were seen in [place] call: [phone]    If you were seen in [place] call: [phone]

## 2019-02-04 NOTE — PROGRESS NOTES
Pt returned to clinic for post surgery 1 week follow up bandage change. Pt has no complaints, denies pain. Bandage removed from right jawline, area cleansed with normal saline. Site is healing and wound edges approximating well. Reapplied new steri strips and paper tape.    Advised to watch for signs/sx of infection; spreading redness, drainage, odor, fever. Call or report promptly to clinic. Pt given written instructions and informed to rtc as needed. Patient verbalized understanding.     Kesha Mchugh MA

## 2019-02-06 ENCOUNTER — TELEPHONE (OUTPATIENT)
Dept: AUDIOLOGY | Facility: CLINIC | Age: 69
End: 2019-02-06

## 2019-02-06 NOTE — TELEPHONE ENCOUNTER
Reason for Call:  Other call back    Detailed comments: pt calling stating the wire on the HA keeps popping out. She just had work done on them.     Phone Number Patient can be reached at: Home number on file [phone] (home)    Best Time: any     Can we leave a detailed message on this number? YES    Call taken on 2/6/2019 at 8:52 AM by Sushma Purcell

## 2019-02-06 NOTE — TELEPHONE ENCOUNTER
Discussed with patient how to work with the retention tail. Will make an appointment if she is unable to get it on her own.    Kathe WRIGHT-BEAU, #[id number]

## 2019-02-13 ENCOUNTER — TELEPHONE (OUTPATIENT)
Dept: AUDIOLOGY | Facility: CLINIC | Age: 69
End: 2019-02-13

## 2019-02-13 NOTE — TELEPHONE ENCOUNTER
Reason for Call:  Other call back    Detailed comments: pt calling stating her HA is scratchy sounding and too much sound now.     Phone Number Patient can be reached at: Home number on file [phone] (home)    Best Time: any     Can we leave a detailed message on this number? YES    Call taken on 2/13/2019 at 2:57 PM by Sushma Purcell

## 2019-02-13 NOTE — TELEPHONE ENCOUNTER
Patient feels the hearing aid is now too loud. Will schedule hearing aid recheck appointment.     Kathe Sneed M.A. F-[facility], #[id number]

## 2019-02-20 ENCOUNTER — OFFICE VISIT (OUTPATIENT)
Dept: AUDIOLOGY | Facility: CLINIC | Age: 69
End: 2019-02-20
Payer: MEDICARE

## 2019-02-20 DIAGNOSIS — H90.3 SENSORINEURAL HEARING LOSS, BILATERAL: Primary | ICD-10-CM

## 2019-02-20 DIAGNOSIS — F31.74 MANIC BIPOLAR I DISORDER IN REMISSION (H): Primary | ICD-10-CM

## 2019-02-20 LAB
ALBUMIN SERPL-MCNC: 3.4 G/DL (ref 3.4–5)
ALP SERPL-CCNC: 55 U/L (ref 40–150)
ALT SERPL W P-5'-P-CCNC: 17 U/L (ref 0–50)
ANION GAP SERPL CALCULATED.3IONS-SCNC: 5 MMOL/L (ref 3–14)
AST SERPL W P-5'-P-CCNC: 16 U/L (ref 0–45)
BASOPHILS # BLD AUTO: 0 10E9/L (ref 0–0.2)
BASOPHILS NFR BLD AUTO: 0.4 %
BILIRUB SERPL-MCNC: 0.4 MG/DL (ref 0.2–1.3)
BUN SERPL-MCNC: 23 MG/DL (ref 7–30)
CALCIUM SERPL-MCNC: 8.6 MG/DL (ref 8.5–10.1)
CHLORIDE SERPL-SCNC: 105 MMOL/L (ref 94–109)
CO2 SERPL-SCNC: 28 MMOL/L (ref 20–32)
CREAT SERPL-MCNC: 0.72 MG/DL (ref 0.52–1.04)
DIFFERENTIAL METHOD BLD: ABNORMAL
EOSINOPHIL # BLD AUTO: 0.1 10E9/L (ref 0–0.7)
EOSINOPHIL NFR BLD AUTO: 2.1 %
ERYTHROCYTE [DISTWIDTH] IN BLOOD BY AUTOMATED COUNT: 13.2 % (ref 10–15)
GFR SERPL CREATININE-BSD FRML MDRD: 85 ML/MIN/{1.73_M2}
GLUCOSE SERPL-MCNC: 77 MG/DL (ref 70–99)
HCT VFR BLD AUTO: 41.5 % (ref 35–47)
HGB BLD-MCNC: 13.4 G/DL (ref 11.7–15.7)
LYMPHOCYTES # BLD AUTO: 0.7 10E9/L (ref 0.8–5.3)
LYMPHOCYTES NFR BLD AUTO: 14 %
MCH RBC QN AUTO: 33.7 PG (ref 26.5–33)
MCHC RBC AUTO-ENTMCNC: 32.3 G/DL (ref 31.5–36.5)
MCV RBC AUTO: 104 FL (ref 78–100)
MONOCYTES # BLD AUTO: 0.9 10E9/L (ref 0–1.3)
MONOCYTES NFR BLD AUTO: 17.7 %
NEUTROPHILS # BLD AUTO: 3.4 10E9/L (ref 1.6–8.3)
NEUTROPHILS NFR BLD AUTO: 65.8 %
PLATELET # BLD AUTO: 158 10E9/L (ref 150–450)
POTASSIUM SERPL-SCNC: 4.2 MMOL/L (ref 3.4–5.3)
PROT SERPL-MCNC: 7.8 G/DL (ref 6.8–8.8)
RBC # BLD AUTO: 3.98 10E12/L (ref 3.8–5.2)
SODIUM SERPL-SCNC: 138 MMOL/L (ref 133–144)
TSH SERPL DL<=0.005 MIU/L-ACNC: 4.51 MU/L (ref 0.4–4)
VALPROATE SERPL-MCNC: 124 MG/L (ref 50–100)
WBC # BLD AUTO: 5.1 10E9/L (ref 4–11)

## 2019-02-20 PROCEDURE — 84443 ASSAY THYROID STIM HORMONE: CPT | Performed by: PSYCHIATRY & NEUROLOGY

## 2019-02-20 PROCEDURE — 36415 COLL VENOUS BLD VENIPUNCTURE: CPT | Performed by: PSYCHIATRY & NEUROLOGY

## 2019-02-20 PROCEDURE — 99207 ZZC NO CHARGE LOS: CPT | Performed by: AUDIOLOGIST

## 2019-02-20 PROCEDURE — 80164 ASSAY DIPROPYLACETIC ACD TOT: CPT | Performed by: PSYCHIATRY & NEUROLOGY

## 2019-02-20 PROCEDURE — 80053 COMPREHEN METABOLIC PANEL: CPT | Performed by: PSYCHIATRY & NEUROLOGY

## 2019-02-20 PROCEDURE — V5299 HEARING SERVICE: HCPCS | Performed by: AUDIOLOGIST

## 2019-02-20 PROCEDURE — 85025 COMPLETE CBC W/AUTO DIFF WBC: CPT | Performed by: PSYCHIATRY & NEUROLOGY

## 2019-02-20 NOTE — PROGRESS NOTES
[facility]         SUBJECTIVE:  Nora Alarcon ,68 year old female comes in for an in office repair of her hearing aids. She reports her right 2017 Phonak Audeo B50-R hearing aid is making a scratchy noise. She reports it is much better than it was last week when she picked up the repaired hearing aid.     OBJECTIVE:  Verified hearing aid functionality. Replaced left wax guard and right retention tail. Reviewed hearing aid care and use. Demonstrated how to change the wax guards. Patient states the hearing aids are working well. Discussed acclimating to the amplified sound.    ASSESSMENT/PLAN:   Return to clinic for hearing aid rechecks and re instruction as needed.     Kathe Sneed M.A. -[facility], #[id number]

## 2019-02-28 ENCOUNTER — TELEPHONE (OUTPATIENT)
Dept: AUDIOLOGY | Facility: CLINIC | Age: 69
End: 2019-02-28

## 2019-02-28 NOTE — TELEPHONE ENCOUNTER
Patient reports right ear feedback when using the phone only. Discussed options with patient. Patient will schedule appointment as desires.    Kathe MURRAY, #[id number]

## 2019-02-28 NOTE — TELEPHONE ENCOUNTER
Reason for Call:  Other call back    Detailed comments: pt calling stating still experiencing buzzing in R HA.     Phone Number Patient can be reached at: Home number on file [phone] (home)    Best Time: any     Can we leave a detailed message on this number? YES    Call taken on 2/28/2019 at 9:53 AM by Sushma Purcell

## 2019-03-14 ENCOUNTER — OFFICE VISIT (OUTPATIENT)
Dept: AUDIOLOGY | Facility: CLINIC | Age: 69
End: 2019-03-14
Payer: COMMERCIAL

## 2019-03-14 ENCOUNTER — ALLIED HEALTH/NURSE VISIT (OUTPATIENT)
Dept: DERMATOLOGY | Facility: CLINIC | Age: 69
End: 2019-03-14
Payer: COMMERCIAL

## 2019-03-14 DIAGNOSIS — H90.3 SENSORINEURAL HEARING LOSS, BILATERAL: Primary | ICD-10-CM

## 2019-03-14 DIAGNOSIS — Z48.02 ATTENTION TO SURGICAL DRESSINGS OR SUTURES: Primary | ICD-10-CM

## 2019-03-14 DIAGNOSIS — Z48.01 ATTENTION TO SURGICAL DRESSINGS OR SUTURES: Primary | ICD-10-CM

## 2019-03-14 PROCEDURE — 99207 ZZC NO CHARGE LOS: CPT | Performed by: AUDIOLOGIST

## 2019-03-14 PROCEDURE — 99207 ZZC NO CHARGE NURSE ONLY: CPT

## 2019-03-14 PROCEDURE — V5299 HEARING SERVICE: HCPCS | Performed by: AUDIOLOGIST

## 2019-03-14 NOTE — PROGRESS NOTES
[facility]         SUBJECTIVE:  Nora Alarcon , 68 year old female comes in for a hearing aid check. She is currently wearing 2018 Phonak Audeo B50-R hearing aids. She reports occasional right ear feedback,     OBJECTIVE:  An otoscopic examination was done and revealed clear external auditory canals bilaterally. Reviewed correct hearing aid insertion. She reports the left hearing aid is much easier for her to insert correctly. No feedback in office with correct insertion.     ASSESSMENT/PLAN:    Return to clinic for hearing aid recheck as needed.    Kathe MURRAY, #[id number]

## 2019-03-14 NOTE — PATIENT INSTRUCTIONS
Can massage the area in small circular motions to help decrease the scar tissue. Call with questions otherwise follow up in 6 months from surgery date for skin check.

## 2019-03-14 NOTE — NURSING NOTE
Pt returned to clinic for post surgery follow up from Mohs surgery on 1.28.2019. Pt has no complaints, denies pain. Site is healing and wound edges approximating well. Patient wanted reassurance that the site was healing well and had no further questions or concerns    Advised to watch for signs/sx of infection; spreading redness, drainage, odor, fever. Call or report promptly to clinic. Patient verbalized understanding.     Venita Hill LPN.................3/14/2019

## 2019-06-25 ENCOUNTER — TRANSFERRED RECORDS (OUTPATIENT)
Dept: HEALTH INFORMATION MANAGEMENT | Facility: CLINIC | Age: 69
End: 2019-06-25

## 2019-06-26 ENCOUNTER — OFFICE VISIT (OUTPATIENT)
Dept: DERMATOLOGY | Facility: CLINIC | Age: 69
End: 2019-06-26
Payer: COMMERCIAL

## 2019-06-26 VITALS — DIASTOLIC BLOOD PRESSURE: 82 MMHG | OXYGEN SATURATION: 99 % | HEART RATE: 77 BPM | SYSTOLIC BLOOD PRESSURE: 157 MMHG

## 2019-06-26 DIAGNOSIS — Z85.828 HISTORY OF SKIN CANCER: Primary | ICD-10-CM

## 2019-06-26 DIAGNOSIS — L82.1 SEBORRHEIC KERATOSIS: ICD-10-CM

## 2019-06-26 DIAGNOSIS — L81.4 LENTIGO: ICD-10-CM

## 2019-06-26 DIAGNOSIS — D18.01 ANGIOMA OF SKIN: ICD-10-CM

## 2019-06-26 DIAGNOSIS — D23.9 DERMAL NEVUS: ICD-10-CM

## 2019-06-26 PROCEDURE — 99213 OFFICE O/P EST LOW 20 MIN: CPT | Performed by: DERMATOLOGY
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
## 2019-06-26 NOTE — NURSING NOTE
"Initial /82   Pulse 77   SpO2 99%  Estimated body mass index is 22.21 kg/m  as calculated from the following:    Height as of 1/25/19: 1.676 m (5' 6\").    Weight as of 1/25/19: 62.4 kg (137 lb 9.6 oz). .    Hayley Bautista LPN    "

## 2019-06-27 ENCOUNTER — TELEPHONE (OUTPATIENT)
Dept: INTERNAL MEDICINE | Facility: CLINIC | Age: 69
End: 2019-06-27

## 2019-06-27 NOTE — LETTER
Nora Alarcon  [address]   [place] [address]          06/27/19      Dear Nora Alarcon        Your most recent blood pressure reading preformed at our clinic was higher than we like to see it. The goal is to have it under 140/90 in clinic.    Please call our clinic [phone] to schedule a blood pressure recheck with our RN staff. This appointment is free of charges and it takes about 15 minutes to be complete.     Be sure to take all of your blood pressure medications, and avoid stimulants like caffeine, cold medicines, sudafed, or tobacco prior to your recheck.    If your blood pressure medication was changed by your provider recently wait 2 weeks before making this recheck appointment.     Thank you for trusting us with your health care.    Sincerely,    Your [facility] Care Team

## 2019-06-27 NOTE — TELEPHONE ENCOUNTER
Panel Management Review      Patient has the following on her problem list:     Hypertension   Last three blood pressure readings:  BP Readings from Last 3 Encounters:   06/26/19 157/82   01/28/19 146/86   01/25/19 138/78     Blood pressure: FAILED    HTN Guidelines:  Less than 140/90      Composite cancer screening  Chart review shows that this patient is due/due soon for the following None  Summary:    Patient is due/failing the following:   BP CHECK    Action needed:   Patient needs nurse only appointment.    Type of outreach:    Sent letter.    Questions for provider review:    None                                                                                                                                    Hoa Hernández CMA ([facility])   (aka: Belinda Hernández)       Chart routed to Care Team .

## 2019-07-01 ENCOUNTER — MEDICAL CORRESPONDENCE (OUTPATIENT)
Dept: HEALTH INFORMATION MANAGEMENT | Facility: CLINIC | Age: 69
End: 2019-07-01

## 2019-07-01 DIAGNOSIS — F31.74 MANIC DISORDER, RECURRENT EPISODE, IN FULL REMISSION (H): Primary | ICD-10-CM

## 2019-07-25 ENCOUNTER — OFFICE VISIT (OUTPATIENT)
Dept: FAMILY MEDICINE | Facility: CLINIC | Age: 69
End: 2019-07-25
Payer: COMMERCIAL

## 2019-07-25 VITALS
BODY MASS INDEX: 20.82 KG/M2 | TEMPERATURE: 98.4 F | SYSTOLIC BLOOD PRESSURE: 124 MMHG | HEART RATE: 71 BPM | OXYGEN SATURATION: 99 % | WEIGHT: 129 LBS | RESPIRATION RATE: 12 BRPM | DIASTOLIC BLOOD PRESSURE: 70 MMHG

## 2019-07-25 DIAGNOSIS — I10 ESSENTIAL HYPERTENSION WITH GOAL BLOOD PRESSURE LESS THAN 140/90: ICD-10-CM

## 2019-07-25 DIAGNOSIS — N39.44 NOCTURNAL ENURESIS: ICD-10-CM

## 2019-07-25 DIAGNOSIS — F31.74 MANIC DISORDER, RECURRENT EPISODE, IN FULL REMISSION (H): ICD-10-CM

## 2019-07-25 DIAGNOSIS — Z86.73 HISTORY OF CVA (CEREBROVASCULAR ACCIDENT): ICD-10-CM

## 2019-07-25 DIAGNOSIS — R60.0 BILATERAL LEG EDEMA: ICD-10-CM

## 2019-07-25 DIAGNOSIS — H10.13 ALLERGIC CONJUNCTIVITIS, BILATERAL: Primary | ICD-10-CM

## 2019-07-25 DIAGNOSIS — F31.0 BIPOLAR AFFECTIVE DISORDER, CURRENT EPISODE HYPOMANIC (H): ICD-10-CM

## 2019-07-25 LAB
ALBUMIN SERPL-MCNC: 3.5 G/DL (ref 3.4–5)
ALP SERPL-CCNC: 56 U/L (ref 40–150)
ALT SERPL W P-5'-P-CCNC: 15 U/L (ref 0–50)
ANION GAP SERPL CALCULATED.3IONS-SCNC: 5 MMOL/L (ref 3–14)
AST SERPL W P-5'-P-CCNC: 17 U/L (ref 0–45)
BASOPHILS # BLD AUTO: 0 10E9/L (ref 0–0.2)
BASOPHILS NFR BLD AUTO: 0.8 %
BILIRUB SERPL-MCNC: 0.3 MG/DL (ref 0.2–1.3)
BUN SERPL-MCNC: 21 MG/DL (ref 7–30)
CALCIUM SERPL-MCNC: 8.8 MG/DL (ref 8.5–10.1)
CHLORIDE SERPL-SCNC: 105 MMOL/L (ref 94–109)
CO2 SERPL-SCNC: 29 MMOL/L (ref 20–32)
CREAT SERPL-MCNC: 0.7 MG/DL (ref 0.52–1.04)
DIFFERENTIAL METHOD BLD: ABNORMAL
EOSINOPHIL # BLD AUTO: 0.1 10E9/L (ref 0–0.7)
EOSINOPHIL NFR BLD AUTO: 3 %
ERYTHROCYTE [DISTWIDTH] IN BLOOD BY AUTOMATED COUNT: 13.4 % (ref 10–15)
GFR SERPL CREATININE-BSD FRML MDRD: 88 ML/MIN/{1.73_M2}
GLUCOSE SERPL-MCNC: 81 MG/DL (ref 70–99)
HCT VFR BLD AUTO: 38.7 % (ref 35–47)
HGB BLD-MCNC: 13.4 G/DL (ref 11.7–15.7)
LYMPHOCYTES # BLD AUTO: 0.6 10E9/L (ref 0.8–5.3)
LYMPHOCYTES NFR BLD AUTO: 16.2 %
MCH RBC QN AUTO: 34.9 PG (ref 26.5–33)
MCHC RBC AUTO-ENTMCNC: 34.6 G/DL (ref 31.5–36.5)
MCV RBC AUTO: 101 FL (ref 78–100)
MONOCYTES # BLD AUTO: 0.7 10E9/L (ref 0–1.3)
MONOCYTES NFR BLD AUTO: 18.4 %
NEUTROPHILS # BLD AUTO: 2.3 10E9/L (ref 1.6–8.3)
NEUTROPHILS NFR BLD AUTO: 61.6 %
PLATELET # BLD AUTO: 163 10E9/L (ref 150–450)
POTASSIUM SERPL-SCNC: 4.2 MMOL/L (ref 3.4–5.3)
PROT SERPL-MCNC: 7.5 G/DL (ref 6.8–8.8)
RBC # BLD AUTO: 3.84 10E12/L (ref 3.8–5.2)
SODIUM SERPL-SCNC: 139 MMOL/L (ref 133–144)
TSH SERPL DL<=0.005 MIU/L-ACNC: 1.93 MU/L (ref 0.4–4)
VALPROATE SERPL-MCNC: 70 MG/L (ref 50–100)
WBC # BLD AUTO: 3.7 10E9/L (ref 4–11)

## 2019-07-25 PROCEDURE — 80053 COMPREHEN METABOLIC PANEL: CPT | Performed by: PSYCHIATRY & NEUROLOGY

## 2019-07-25 PROCEDURE — 85025 COMPLETE CBC W/AUTO DIFF WBC: CPT | Performed by: PSYCHIATRY & NEUROLOGY

## 2019-07-25 PROCEDURE — 84443 ASSAY THYROID STIM HORMONE: CPT | Performed by: PSYCHIATRY & NEUROLOGY

## 2019-07-25 PROCEDURE — 36415 COLL VENOUS BLD VENIPUNCTURE: CPT | Performed by: PSYCHIATRY & NEUROLOGY

## 2019-07-25 PROCEDURE — 80164 ASSAY DIPROPYLACETIC ACD TOT: CPT | Performed by: PSYCHIATRY & NEUROLOGY

## 2019-07-25 PROCEDURE — 99214 OFFICE O/P EST MOD 30 MIN: CPT | Performed by: INTERNAL MEDICINE

## 2019-07-25 RX ORDER — MIRTAZAPINE 15 MG/1
15 TABLET, FILM COATED ORAL PRN
COMMUNITY
Start: 2019-07-18 | End: 2019-07-25

## 2019-07-25 RX ORDER — DIVALPROEX SODIUM 500 MG/1
500 TABLET, DELAYED RELEASE ORAL DAILY
COMMUNITY
Start: 2019-07-24 | End: 2019-07-25

## 2019-07-25 RX ORDER — TOLTERODINE 4 MG/1
4 CAPSULE, EXTENDED RELEASE ORAL DAILY
Qty: 90 CAPSULE | Refills: 3 | Status: SHIPPED | OUTPATIENT
Start: 2019-07-25 | End: 2020-06-30

## 2019-07-25 RX ORDER — VALPROIC ACID 250 MG/5ML
500 SOLUTION ORAL DAILY
COMMUNITY
Start: 2016-03-16 | End: 2020-08-20

## 2019-07-25 RX ORDER — OLOPATADINE HYDROCHLORIDE 2 MG/ML
1 SOLUTION/ DROPS OPHTHALMIC DAILY
Qty: 2.5 ML | Refills: 11 | Status: SHIPPED | OUTPATIENT
Start: 2019-07-25 | End: 2021-02-26

## 2019-07-25 NOTE — PATIENT INSTRUCTIONS
1. Increase detrol to 4 mg once daily for the bladder symptoms. Watch for increase in dry mouth or dizziness.   2. For the eye symptoms, start Pataday drops until symptoms clear.  They should improve in the next 2-4 days.  If worsening or no improvement, might need antibiotic eye drops, ok to call in for this.  3. Symptoms of tick borne disease is fevers and body aches.   If this happens, be seen.

## 2019-07-25 NOTE — PROGRESS NOTES
Subjective     Nora Alarcon is a 68 year old female who presents to clinic today for the following health issues:    HPI       Hypertension Follow-up      Do you check your blood pressure regularly outside of the clinic? Yes 132/70 pulse 72    Are you following a low salt diet? Yes    Are your blood pressures ever more than 140 on the top number (systolic) OR more   than 90 on the bottom number (diastolic), for example 140/90? No    Amount of exercise or physical activity: None    Problems taking medications regularly: No    Medication side effects: none    Diet: low salt      Eye problem: some matter this morning, wonder if its related to seasonal allergies. Had eye exam 1 month ago.  Known seasonal allergies.  Bilateral eyes, but R>L.  Allergy symptoms are also acting up.  Tried some sort of over the counter allergy pill.    New rx for lilibeth socks:     Incontinence: still boathered by symptoms.  Has dry mouth.  Still having incontinence, needing pads.    Tick bites:  Removed tick 4-5 days ago.  Tick attached for < 36 hours, but needed to remove with tweezers        Current Outpatient Medications   Medication Sig Dispense Refill     Acetaminophen (TYLENOL PO) Take 1,000 mg by mouth 2 times daily as needed for mild pain or fever        ASPIRIN PO Take 325 mg by mouth daily        Divalproex Sodium (DEPAKOTE PO) Take 500 mg by mouth 2 times daily       MIRTAZAPINE PO Take 45 mg by mouth At Bedtime       order for DME Equipment being ordered: bilateral below the knee LILIBETH stockings 2 each 2     simvastatin (ZOCOR) 10 MG tablet Take 1 tablet (10 mg) by mouth At Bedtime 90 tablet 3     TYMLOS 3120 MCG/1.56ML SOPN injection Inject 80 mcg Subcutaneous daily        Valproate Sodium (VALPROIC ACID) 250 MG/5ML 500 mg by Enteral route daily 10 mL (500 mg total) by Enteral Tube route 2 (two) times a day.       tolterodine (DETROL LA) 2 MG 24 hr capsule Take 1 capsule (2 mg) by mouth daily 90 capsule 3         Reviewed and  updated as needed this visit by Provider         Review of Systems   ROS COMP: Constitutional, HEENT, cardiovascular, pulmonary, gi and gu systems are negative, except as otherwise noted.      Objective    /70   Pulse 71   Temp 98.4  F (36.9  C) (Tympanic)   Resp 12   Wt 58.5 kg (129 lb)   SpO2 99%   Breastfeeding? No   BMI 20.82 kg/m    Body mass index is 20.82 kg/m .  Physical Exam   GENERAL APPEARANCE: alert and no distress          Assessment & Plan     1. Bilateral leg edema -reordered compression stockings, advised patient to be measured  - order for DME; Equipment being ordered: bilateral below the knee LILIBETH stockings  Dispense: 2 each; Refill: 2    2. Nocturnal enuresis  - uncontrolled.  Increase to 4 mg.  Watch for dry mouth and drowsiness/dizziness  - tolterodine ER (DETROL LA) 4 MG 24 hr capsule; Take 1 capsule (4 mg) by mouth daily  Dispense: 90 capsule; Refill: 3    3. Allergic conjunctivitis, bilateral - if symptoms don't improve in a few days, would order antibiotic eye drop  - olopatadine (PATADAY) 0.2 % ophthalmic solution; Place 1 drop into both eyes daily  Dispense: 2.5 mL; Refill: 11    4. Bipolar affective disorder, current episode hypomanic (H) -stable, follows with psychiatry    5. Essential hypertension with goal blood pressure less than 140/90 = stable off meds    6. History of CVA (cerebrovascular accident) -on , statin.         Patient Instructions   1. Increase detrol to 4 mg once daily for the bladder symptoms. Watch for increase in dry mouth or dizziness.   2. For the eye symptoms, start Pataday drops until symptoms clear.  They should improve in the next 2-4 days.  If worsening or no improvement, might need antibiotic eye drops, ok to call in for this.  3. Symptoms of tick borne disease is fevers and body aches.   If this happens, be seen.      Return in about 1 week (around 8/1/2019) for If symptoms don't improve or if they worsen.    Dr. Brandie Hill,  DO  [facility]

## 2019-08-02 ENCOUNTER — TELEPHONE (OUTPATIENT)
Dept: INTERNAL MEDICINE | Facility: CLINIC | Age: 69
End: 2019-08-02

## 2019-08-02 ENCOUNTER — OFFICE VISIT (OUTPATIENT)
Dept: FAMILY MEDICINE | Facility: CLINIC | Age: 69
End: 2019-08-02
Payer: COMMERCIAL

## 2019-08-02 VITALS
DIASTOLIC BLOOD PRESSURE: 78 MMHG | WEIGHT: 129 LBS | SYSTOLIC BLOOD PRESSURE: 136 MMHG | TEMPERATURE: 99.1 F | RESPIRATION RATE: 20 BRPM | HEIGHT: 66 IN | OXYGEN SATURATION: 97 % | BODY MASS INDEX: 20.73 KG/M2 | HEART RATE: 79 BPM
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
## 2019-08-02 PROCEDURE — 99212 OFFICE O/P EST SF 10 MIN: CPT | Performed by: FAMILY MEDICINE

## 2019-08-02 ASSESSMENT — MIFFLIN-ST. JEOR: SCORE: 1126.89

## 2019-08-02 NOTE — TELEPHONE ENCOUNTER
RN Tickbite Protocol: Ages 8 and older  Nora Alarcon is a 68 year old female is being assessed for indication of tick bite.     NURSING ASSESSMENT:   Removal of tick that has been attached at least 36 hours? yes   Tick was removed within the last 72 hours? no - removed last weekend  Location on body of suspected tick bite: behind left ear   Symptoms:  None  Complicating factors:  Reports: Greater than 72 hours since tick removed   Denies: any symptoms.    NURSING PLAN: Nursing advice to patient patient had appt for Monday already however patient requested she be seen today.    EDUCATION: How to identify a deer tick, Important time frames related to tick bite and Preventing tick bites    RECOMMENDED DISPOSITION:  Appt scheduled for today - patient had appt 8-5-19 before speaking with RN and did not want to wait until Monday.  Will comply with recommendation: Yes  Nurse Triage    Helen Padron RN
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
## 2019-08-02 NOTE — PATIENT INSTRUCTIONS
Thank you for choosing [facility].  You may be receiving an email and/or telephone survey request from [facility] [employer] regarding your visit today.  Please take a few minutes to respond to the survey to let us know how we are doing.      If you have questions or concerns, please contact us via GRAYL or you can contact your care team at [phone].    Our Clinic hours are:  Monday 6:40 am  to 7:00 pm  Tuesday -Friday 6:40 am to 5:00 pm    The [place] outpatient lab hours are:  Monday - Friday 6:10 am to 4:45 pm  Saturdays 7:00 am to 11:00 am  Appointments are required, call [phone]    If you have clinical questions after hours or would like to schedule an appointment,  call the clinic at [phone].    (W57.XXXA) Tick bite, initial encounter  (primary encounter diagnosis)  Comment:   Plan: We discussed the monitoring of the symptoms of Lyme disease, such as a red, ring like rash; swollen joints; sore throat; headache; fatigue; and fever.   These could occur in the next 1-2 weeks. If these occur in the next few weeks, just call our clinic RN at [phone] and I will order Doxycycline at 100 mg twice daily for 14 days.   Prevention discussed. Follow up as needed.

## 2019-08-02 NOTE — TELEPHONE ENCOUNTER
Reason for Call:  Other Tick bite    Detailed comments: Pt calling and found tick on her scalp but left ear and pull if off a few days ago.  Wondering what she should do.  Does have an appt today at 1:20 on Monday      Phone Number Patient can be reached at: Home number on file [phone] (home)    Best Time: any    Can we leave a detailed message on this number? YES    Call taken on 8/2/2019 at 8:41 AM by Arcelia Alonso

## 2019-09-03 ENCOUNTER — HOSPITAL ENCOUNTER (OUTPATIENT)
Dept: MAMMOGRAPHY | Facility: CLINIC | Age: 69
Discharge: HOME | End: 2019-09-03
Attending: INTERNAL MEDICINE | Admitting: INTERNAL MEDICINE
Payer: COMMERCIAL

## 2019-09-03 DIAGNOSIS — Z12.31 VISIT FOR SCREENING MAMMOGRAM: ICD-10-CM

## 2019-09-03 PROCEDURE — 77067 SCR MAMMO BI INCL CAD: CPT

## 2019-09-16 DIAGNOSIS — E78.5 HYPERLIPIDEMIA LDL GOAL <130: ICD-10-CM

## 2019-09-16 NOTE — LETTER
[facility]  [address] [address]  [phone]        September 17, 2019    Nora Alarcon                                                                                                                     [address]  [place] [address]                Dear Nora,    We have received a refill request from your pharmacy for Simvastatin, however, we were only able to provide a one time fill because you are due for an Office visit and fasting labs.     Please call [phone] to schedule an appointment before you are due for your next refill.        Sincerely,         Brandie Hill's Care Team

## 2019-09-16 NOTE — TELEPHONE ENCOUNTER
"Requested Prescriptions   Pending Prescriptions Disp Refills     simvastatin (ZOCOR) 10 MG tablet [Pharmacy Med Name: SIMVASTATIN 10MG TABLET] 90 tablet 3     Sig: TAKE 1 TABLET BY MOUTH DAILY AT BEDTIME   Last Written Prescription Date:  9/14/18  Last Fill Quantity: 90 tab,  # refills: 3   Last office visit: 8/2/2019 with prescribing provider:  SEBLE Strickland   Future Office Visit:        Statins Protocol Passed - 9/16/2019  1:00 AM        Passed - LDL on file in past 12 months     Recent Labs   Lab Test 09/17/18  0727   LDL 68             Passed - No abnormal creatine kinase in past 12 months     No lab results found.             Passed - Recent (12 mo) or future (30 days) visit within the authorizing provider's specialty     Patient had office visit in the last 12 months or has a visit in the next 30 days with authorizing provider or within the authorizing provider's specialty.  See \"Patient Info\" tab in inbasket, or \"Choose Columns\" in Meds & Orders section of the refill encounter.              Passed - Medication is active on med list        Passed - Patient is age 18 or older        Passed - No active pregnancy on record        Passed - No positive pregnancy test in past 12 months          "

## 2019-09-17 RX ORDER — SIMVASTATIN 10 MG
TABLET ORAL
Qty: 30 TABLET | Refills: 0 | Status: SHIPPED | OUTPATIENT
Start: 2019-09-17 | End: 2019-10-03

## 2019-09-17 NOTE — TELEPHONE ENCOUNTER
Medication is being filled for 1 time refill only due to:  Patient needs labs FLP. Patient needs to be seen because it has been more than one year since last visit.     Sent message with script to pharmacy and mailed letter to home.

## 2019-09-24 ENCOUNTER — IMMUNIZATION (OUTPATIENT)
Dept: FAMILY MEDICINE | Facility: CLINIC | Age: 69
End: 2019-09-24
Payer: COMMERCIAL

## 2019-09-24 DIAGNOSIS — Z23 NEED FOR PROPHYLACTIC VACCINATION AND INOCULATION AGAINST INFLUENZA: Primary | ICD-10-CM

## 2019-09-24 PROCEDURE — 90662 IIV NO PRSV INCREASED AG IM: CPT

## 2019-09-24 PROCEDURE — G0008 ADMIN INFLUENZA VIRUS VAC: HCPCS

## 2019-09-24 PROCEDURE — 99207 ZZC NO CHARGE NURSE ONLY: CPT

## 2019-10-03 ENCOUNTER — OFFICE VISIT (OUTPATIENT)
Dept: FAMILY MEDICINE | Facility: CLINIC | Age: 69
End: 2019-10-03
Payer: COMMERCIAL

## 2019-10-03 VITALS
BODY MASS INDEX: 21.14 KG/M2 | RESPIRATION RATE: 12 BRPM | TEMPERATURE: 97.7 F | HEART RATE: 81 BPM | OXYGEN SATURATION: 98 % | SYSTOLIC BLOOD PRESSURE: 110 MMHG | WEIGHT: 131 LBS | DIASTOLIC BLOOD PRESSURE: 80 MMHG

## 2019-10-03 DIAGNOSIS — E78.5 HYPERLIPIDEMIA LDL GOAL <130: ICD-10-CM

## 2019-10-03 DIAGNOSIS — Z12.11 SPECIAL SCREENING FOR MALIGNANT NEOPLASMS, COLON: ICD-10-CM

## 2019-10-03 DIAGNOSIS — Z00.00 ENCOUNTER FOR MEDICARE ANNUAL WELLNESS EXAM: Primary | ICD-10-CM

## 2019-10-03 LAB
CHOLEST SERPL-MCNC: 169 MG/DL
HDLC SERPL-MCNC: 86 MG/DL
LDLC SERPL CALC-MCNC: 65 MG/DL
NONHDLC SERPL-MCNC: 83 MG/DL
TRIGL SERPL-MCNC: 91 MG/DL

## 2019-10-03 PROCEDURE — 36415 COLL VENOUS BLD VENIPUNCTURE: CPT | Performed by: INTERNAL MEDICINE

## 2019-10-03 PROCEDURE — 99397 PER PM REEVAL EST PAT 65+ YR: CPT | Performed by: INTERNAL MEDICINE

## 2019-10-03 PROCEDURE — 99213 OFFICE O/P EST LOW 20 MIN: CPT | Mod: 25 | Performed by: INTERNAL MEDICINE

## 2019-10-03 PROCEDURE — 80061 LIPID PANEL: CPT | Performed by: INTERNAL MEDICINE

## 2019-10-03 RX ORDER — SIMVASTATIN 10 MG
10 TABLET ORAL AT BEDTIME
Qty: 90 TABLET | Refills: 3 | Status: SHIPPED | OUTPATIENT
Start: 2019-10-03 | End: 2020-09-29

## 2019-10-03 NOTE — PATIENT INSTRUCTIONS
Patient Education   Personalized Prevention Plan  You are due for the preventive services outlined below.  Your care team is available to assist you in scheduling these services.  If you have already completed any of these items, please share that information with your care team to update in your medical record.  Health Maintenance Due   Topic Date Due     Hepatitis C Screening  1950     Zoster (Shingles) Vaccine (1 of 2) 12/17/1999     Annual Wellness Visit  09/20/2017     FALL RISK ASSESSMENT  04/09/2019     Cholesterol Lab  09/17/2019           1. Blood work today.   2. Refill the simvastatin  3. You are due for a colonoscopy in Feb.

## 2019-10-03 NOTE — PROGRESS NOTES
"Subjective     Nora Alarcon is a 68 year old female who presents to clinic today for the following health issues:    HPI   Hyperlipidemia Follow-Up      Are you having any of the following symptoms? (Select all that apply)  No complaints of shortness of breath, chest pain or pressure.  No increased sweating or nausea with activity.  No left-sided neck or arm pain.  No complaints of pain in calves when walking 1-2 blocks.    Are you regularly taking any medication or supplement to lower your cholesterol?   Yes- Simvastatin 10 mg    Are you having muscle aches or other side effects that you think could be caused by your cholesterol lowering medication?  No      Hypertension Follow-up      Do you check your blood pressure regularly outside of the clinic? Yes 130/70    Are you following a low salt diet? Yes    Are your blood pressures ever more than 140 on the top number (systolic) OR more   than 90 on the bottom number (diastolic), for example 140/90? Yes      Annual Wellness Visit    Are you in the first 12 months of your Medicare Part B coverage?  No    Physical Health:    In general, how would you rate your overall physical health? good    If you drink alcohol do you typically have >3 drinks per day or >7 drinks per week? No    Do you usually eat at least 4 servings of fruit and vegetables a day, include whole grains & fiber and avoid regularly eating high fat or \"junk\" foods? Yes    Needs assistance for the following daily activities: no assistance needed    Which of the following safety concerns are present in your home?  none identified     Hearing impairment: No    In the past 6 months, have you been bothered by leaking of urine? yes    Mental Health:    In general, how would you rate your overall mental or emotional health? fair  PHQ-2 Score:      Do you feel safe in your environment? Yes    Do you have a Health Care Directive? No: Advance care planning reviewed with patient; information given to patient to " review.    Fall risk:       Cognitive Screenin) Repeat 3 items (Leader, Season, Table)  All 3 words repeated  2) Clock draw: NORMAL  3) 3 item recall: Recalls 3 objects  Results: 3 items recalled: COGNITIVE IMPAIRMENT LESS LIKELY    Mini-CogTM Copyright S Emma. Licensed by the author for use in [facility]; reprinted with permission (jerry@[facility]). All rights reserved.      Current providers sharing in care for this patient include:   Patient Care Team:  Brandie Hill DO as PCP - General (Internal Medicine)  Isabella Clements MD as MD (Hematology & Oncology)  Isabella Clements MD as MD (Hematology & Oncology)  Brandie Chris APRN CNP as Nurse Practitioner (Nurse Practitioner)  Brandie Hill DO as Assigned PCP        Do you have sleep apnea, excessive snoring or daytime drowsiness?: no      Current Outpatient Medications   Medication Sig Dispense Refill     Acetaminophen (TYLENOL PO) Take 1,000 mg by mouth 2 times daily as needed for mild pain or fever        ASPIRIN PO Take 325 mg by mouth daily        Divalproex Sodium (DEPAKOTE PO) Take 500 mg by mouth 2 times daily       MIRTAZAPINE PO Take 45 mg by mouth At Bedtime       olopatadine (PATADAY) 0.2 % ophthalmic solution Place 1 drop into both eyes daily 2.5 mL 11     order for DME Equipment being ordered: bilateral below the knee LILIBETH stockings 2 each 2     simvastatin (ZOCOR) 10 MG tablet Take 1 tablet (10 mg) by mouth At Bedtime 90 tablet 3     tolterodine ER (DETROL LA) 4 MG 24 hr capsule Take 1 capsule (4 mg) by mouth daily 90 capsule 3     TYMLOS 3120 MCG/1.56ML SOPN injection Inject 80 mcg Subcutaneous daily        Valproate Sodium (VALPROIC ACID) 250 MG/5ML 500 mg by Enteral route daily 10 mL (500 mg total) by Enteral Tube route 2 (two) times a day.           Reviewed and updated as needed this visit by Provider  Allergies  Meds  Problems         Review of Systems   ROS COMP: Constitutional, HEENT,  cardiovascular, pulmonary, GI, , musculoskeletal, neuro, skin, endocrine and psych systems are negative, except as otherwise noted.      Objective    /80   Pulse 81   Temp 97.7  F (36.5  C) (Tympanic)   Resp 12   Wt 59.4 kg (131 lb)   SpO2 98%   Breastfeeding? No   BMI 21.14 kg/m    Body mass index is 21.14 kg/m .  Physical Exam               Assessment & Plan     1. Encounter for Medicare annual wellness exam    2. Hyperlipidemia LDL goal <130 -  - stable, refill provided  - Lipid panel reflex to direct LDL Fasting  - simvastatin (ZOCOR) 10 MG tablet; Take 1 tablet (10 mg) by mouth At Bedtime  Dispense: 90 tablet; Refill: 3    3. Special screening for malignant neoplasms, colon - due in Feb  - GASTROENTEROLOGY ADULT REF PROCEDURE ONLY Other; [place] [facility] [phone]         Return in about 53 weeks (around 10/8/2020) for Annual Wellness Visit.    Brandie Hill,   [facility]

## 2019-10-10 ENCOUNTER — TELEPHONE (OUTPATIENT)
Dept: INTERNAL MEDICINE | Facility: CLINIC | Age: 69
End: 2019-10-10

## 2019-10-10 NOTE — TELEPHONE ENCOUNTER
Reason for call:  Patient reporting a symptom    Symptom or request: Right eye drainage and left hip pain.  Patient thinks she might have shingles.      Duration (how long have symptoms been present): 1 week    Have you been treated for this before? No    Additional comments:     Phone Number patient can be reached at:  Home number on file [phone] (home)    Best Time:  Any    Can we leave a detailed message on this number:  YES    Call taken on 10/10/2019 at 9:05 AM by Tresa Pizano

## 2019-10-10 NOTE — TELEPHONE ENCOUNTER
"I spoke with pt.    She has an appt tomorrow at 9:40.    Pt explains that these are two separate concerns.  1.  Eye drainage.  2.  Hip pain.      S-(situation): For the eye drainage, pt reports that her right eye has been draining for 2 weeks.  Yesterday, she went of a walk and the wind seemed to irritate her eye.  Today, it was mattery, redness around her eye, and lots of drainage.  Pt has been warm packing it and she updates that she is much improved.    Denies eye pain.  Denies vision changes.  Denies rashes or lesions.    B-(background): Per above.    A-(assessment): eye drainage x 2 weeks.    R-(recommendations): Advised to keep appt tomorrow.        S-(situation):  Hip pain.  Pt reports that she has had hip pain for \"a long time.\"  She says that Dr Hill did a hip x-ray and told pt that she has arthritis in her hip.    Pt denies new or worsening symptoms.  Pain is ongoing and bothersome.    B-(background): per above.    A-(assessment): ongoing hip pain with reported arthritis.    R-(recommendations):  Advised to keep appt tomorrow to address hip pain again as well.    Maricruz Thorpe RN                "

## 2019-10-11 ENCOUNTER — OFFICE VISIT (OUTPATIENT)
Dept: FAMILY MEDICINE | Facility: CLINIC | Age: 69
End: 2019-10-11
Payer: COMMERCIAL

## 2019-10-11 VITALS
OXYGEN SATURATION: 98 % | RESPIRATION RATE: 12 BRPM | TEMPERATURE: 97.7 F | SYSTOLIC BLOOD PRESSURE: 116 MMHG | DIASTOLIC BLOOD PRESSURE: 72 MMHG | HEART RATE: 77 BPM

## 2019-10-11 DIAGNOSIS — H10.023 PINK EYE DISEASE OF BOTH EYES: Primary | ICD-10-CM

## 2019-10-11 DIAGNOSIS — M25.552 HIP PAIN, LEFT: ICD-10-CM

## 2019-10-11 PROCEDURE — 99213 OFFICE O/P EST LOW 20 MIN: CPT | Performed by: INTERNAL MEDICINE

## 2019-10-11 RX ORDER — POLYMYXIN B SULFATE AND TRIMETHOPRIM 1; 10000 MG/ML; [USP'U]/ML
1-2 SOLUTION OPHTHALMIC EVERY 6 HOURS
Qty: 3 ML | Refills: 0 | Status: SHIPPED | OUTPATIENT
Start: 2019-10-11 | End: 2019-10-11

## 2019-10-11 RX ORDER — POLYMYXIN B SULFATE AND TRIMETHOPRIM 1; 10000 MG/ML; [USP'U]/ML
1-2 SOLUTION OPHTHALMIC EVERY 6 HOURS
Qty: 3 ML | Refills: 0 | Status: SHIPPED | OUTPATIENT
Start: 2019-10-11 | End: 2020-10-01

## 2019-10-11 NOTE — PROGRESS NOTES
Subjective     Nora Alarcon is a 68 year old female who presents to clinic today for the following health issues:    HPI     Eye - concern its shingles, there is a telephone encounter from 10/11/2019.     Eye(s) Problem  Onset: 2 weeks     Description:   Location: right  Pain: no  Redness: YES    Accompanying Signs & Symptoms:  Discharge/mattering: YES  Swelling: no   Visual changes: no  Fever: no  Nasal Congestion: YES- cold  Bothered by bright lights: no    History:   Trauma: no   Foreign body exposure: no    Precipitating factors:   Wearing contacts: no    Alleviating factors:Improved by: na    Therapies Tried and outcome: na    Joint Pain    Onset: Follow up    Description:   Location: Left Hip  Character: Dull ache    Intensity: moderate    Progression of Symptoms: same    Accompanying Signs & Symptoms:  Other symptoms: none    History:   Previous similar pain: YES      Precipitating factors:   Trauma or overuse: no     Alleviating factors:Improved by: nothing    Therapies Tried and outcome: Tylenol is helpful.          Current Outpatient Medications   Medication Sig Dispense Refill     Acetaminophen (TYLENOL PO) Take 1,000 mg by mouth 2 times daily as needed for mild pain or fever        ASPIRIN PO Take 325 mg by mouth daily        Divalproex Sodium (DEPAKOTE PO) Take 500 mg by mouth 2 times daily       MIRTAZAPINE PO Take 45 mg by mouth At Bedtime       olopatadine (PATADAY) 0.2 % ophthalmic solution Place 1 drop into both eyes daily 2.5 mL 11     order for DME Equipment being ordered: bilateral below the knee LILIBETH stockings 2 each 2     simvastatin (ZOCOR) 10 MG tablet Take 1 tablet (10 mg) by mouth At Bedtime 90 tablet 3     tolterodine ER (DETROL LA) 4 MG 24 hr capsule Take 1 capsule (4 mg) by mouth daily 90 capsule 3     TYMLOS 3120 MCG/1.56ML SOPN injection Inject 80 mcg Subcutaneous daily        Valproate Sodium (VALPROIC ACID) 250 MG/5ML 500 mg by Enteral route daily 10 mL (500 mg total) by Enteral  Tube route 2 (two) times a day.           Reviewed and updated as needed this visit by Provider         Review of Systems   ROS COMP: Constitutional, HEENT, cardiovascular, pulmonary, gi and gu systems are negative, except as otherwise noted.      Objective    /72   Pulse 77   Temp 97.7  F (36.5  C) (Tympanic)   Resp 12   SpO2 98%   Breastfeeding? No   There is no height or weight on file to calculate BMI.  Physical Exam   GENERAL APPEARANCE: alert and no distress  EYES: PERRL, lids and lashes with mild purulent crusting/drainage and conjunctiva/corneas- conjunctival injection bilateral and mild  SKIN: No skin changes over area of pain in the left hip          Assessment & Plan     1. Pink eye disease of both eyes -allergic versus viral versus bacterial.  Given duration of symptoms, we will try an antibacterial drop.  She has already been using allergic eyedrops without relief.  - trimethoprim-polymyxin b (POLYTRIM) 53096-4.1 UNIT/ML-% ophthalmic solution; Place 1-2 drops into both eyes every 6 hours  Dispense: 3 mL; Refill: 0    2. Hip pain, left -she reports the pain is very mild and completely manageable with Tylenol.  She was also just worried that she may be developing shingles and wanted the skin evaluated.  No sign of shingles.  Advised her to continue Tylenol         Return for If symptoms don't improve or if they worsen.    Brandie Hill, DO  [facility]

## 2019-10-11 NOTE — PATIENT INSTRUCTIONS
1.no sign of shingles  2. Use the antibiotic eye drop x 1 week  3. Ok to keep using warm packs.  Ok to use artifical drops      Patient Education     What Is Conjunctivitis?    Conjunctivitis is an irritation or infection. It affects the membrane that covers the white of your eye and the inside of your eyelid (conjunctiva). It can happen to one or both eyes. The membrane swells and the blood vessels enlarge (dilate). This makes your eye red. That's why conjunctivitis is sometimes called red eye or pink eye.  What are the symptoms?  If you have one or more of these symptoms, see an eye healthcare provider:    Redness in and around your eye    Eyes that are puffy and sore    Itching, burning, or stinging eyes    Watery eyes or discharge from your eye    Eyelids that are crusty or stuck together when you wake up in the morning    Pink color in the whites of one or both eyes    Sensitivity to bright light  Getting treatment quickly can help prevent damage to your eyes.  How is it diagnosed?  Conjunctivitis is usually a minor eye infection. But it can sometimes become a more serious problem. Some more serious eye diseases have symptoms that look like conjunctivitis. So it's important for an eye healthcare provider to diagnose you. Your eye healthcare provider will ask about your symptoms and any medicines you take. He or she will ask about any illnesses or medical conditions you may have. The healthcare provider will also check your eyes with a hand-held light and a special microscope called a slit lamp.  Date Last Reviewed: 10/1/2017    8152-5821 [employer]. [address] [place] [address], [place] [address]. All rights reserved. This information is not intended as a substitute for professional medical care. Always follow your healthcare professional's instructions.

## 2019-10-29 NOTE — PROGRESS NOTES
Subjective     Nora Alarcon is a 68 year old female who presents to clinic today for the following health issues:    HPI         Joint Pain    Onset: Follow up 10/11/19    Description:   Location: Left Hip pain  Character: Sharp and Stabbing    Intensity: moderate    Progression of Symptoms: same    Accompanying Signs & Symptoms:  Other symptoms: none    History:   Previous similar pain: YES      Precipitating factors:   Trauma or overuse: no     Alleviating factors:Improved by: heat, Tylenol     Therapies Tried and outcome: na    Taking Tylenol which is not helping any longer.        Couple days ago pt had a broken pipe in her bathroom which kind flood her bathroom so she had to take care the mess, which has trigger her back spasms back.     Back Pain       Duration: Couple days ago        Specific cause: lifting    Description:   Location of pain: low back both  Character of pain: sharp, dull ache, stabbing and constant  Pain radiation:none  New numbness or weakness in legs, not attributed to pain:  no     Intensity: Currently 9/10, At its worst 9/10    History:   Pain interferes with job: Not applicable  History of back problems: recurrent self limited episodes of low back pain in the past  Any previous MRI or X-rays: None  Sees a specialist for back pain:  No  Therapies tried without relief: na    Alleviating factors:   Improved by: na      Precipitating factors:Worsened by: Lifting, Bending and Sitting      Xray Pelvis 7/2018 -                                                       IMPRESSION: An AP pelvis and left lateral hip show no acute fracture or dislocation.       Xray Lumbar Spine 6/2015 -   IMPRESSION: Osteoporosis of the visualized lower thoracic end of the lumbar spine. There is mild compression of the superior endplate of T12, likely old on an osteoporotic basis. There is degenerative disc disease and narrowing of the L5-S1 interspace. Degenerative changes in  the posterior facet joints of the mid  and lower lumbar spine. Lower thoracolumbar scoliosis convex left. Calcification of the abdominal aorta.     Current Outpatient Medications   Medication Sig Dispense Refill     Acetaminophen (TYLENOL PO) Take 1,000 mg by mouth 2 times daily as needed for mild pain or fever        ASPIRIN PO Take 325 mg by mouth daily        Divalproex Sodium (DEPAKOTE PO) Take 500 mg by mouth 2 times daily       MIRTAZAPINE PO Take 45 mg by mouth At Bedtime       olopatadine (PATADAY) 0.2 % ophthalmic solution Place 1 drop into both eyes daily 2.5 mL 11     order for DME Equipment being ordered: bilateral below the knee LILIBETH stockings 2 each 2     simvastatin (ZOCOR) 10 MG tablet Take 1 tablet (10 mg) by mouth At Bedtime 90 tablet 3     tolterodine ER (DETROL LA) 4 MG 24 hr capsule Take 1 capsule (4 mg) by mouth daily 90 capsule 3     trimethoprim-polymyxin b (POLYTRIM) 69897-7.1 UNIT/ML-% ophthalmic solution Place 1-2 drops into both eyes every 6 hours 3 mL 0     TYMLOS 3120 MCG/1.56ML SOPN injection Inject 80 mcg Subcutaneous daily        Valproate Sodium (VALPROIC ACID) 250 MG/5ML 500 mg by Enteral route daily 10 mL (500 mg total) by Enteral Tube route 2 (two) times a day.           Reviewed and updated as needed this visit by Provider         Review of Systems   ROS COMP: Constitutional, HEENT, cardiovascular, pulmonary, GI, , musculoskeletal, neuro, skin, endocrine and psych systems are negative, except as otherwise noted.      Objective    /80   Pulse 60   Temp 97.5  F (36.4  C) (Tympanic)   Resp 12   Wt 59.4 kg (131 lb)   SpO2 98%   Breastfeeding? No   BMI 21.14 kg/m    Body mass index is 21.14 kg/m .  Physical Exam   GENERAL APPEARANCE: alert and no distress  MS: Mild to moderate pain with palpation of the left paralumbar muscles, left SI notch.  Reduced hip flexion/extension due to pain.  Reduced lumbar extension/extension due to pain. Negative straight leg raise.  PSYCH: anxious, worried and tearful per  normal          Assessment & Plan       ICD-10-CM    1. Hip pain, left M25.552 XR Lumbar Spine 2/3 Views     XR Pelvis 1/2 Views     PHYSICAL THERAPY REFERRAL   2. Acute bilateral low back pain without sciatica M54.5 XR Lumbar Spine 2/3 Views     XR Pelvis 1/2 Views     PHYSICAL THERAPY REFERRAL     Compression fracture versus arthritis versus muscle strain/sprain.  Try an NSAID in addition to the acetaminophen.  Start therapy.  Follow-up if new symptoms develop or if it is not improving.  Would want to avoid steroids or muscle relaxer given her underlying mental health issues -she would likely have significant cognitive side effects from either of these medications    Patient Instructions   1. Xray today  2. Try Advil 400-600 mg (2-3 pills) twice daily IN ADDITION to Tylenol.  Take it with food.  3. Start physical therapy  4. Come back if pain is not improving, certainly if getting worse.      Return in about 1 month (around 11/30/2019) for If symptoms don't improve or if they worsen.    Brandie Hill, DO  [facility]

## 2019-10-30 ENCOUNTER — ANCILLARY PROCEDURE (OUTPATIENT)
Dept: GENERAL RADIOLOGY | Facility: CLINIC | Age: 69
End: 2019-10-30
Attending: INTERNAL MEDICINE
Payer: COMMERCIAL

## 2019-10-30 ENCOUNTER — OFFICE VISIT (OUTPATIENT)
Dept: FAMILY MEDICINE | Facility: CLINIC | Age: 69
End: 2019-10-30
Payer: COMMERCIAL

## 2019-10-30 VITALS
RESPIRATION RATE: 12 BRPM | DIASTOLIC BLOOD PRESSURE: 80 MMHG | OXYGEN SATURATION: 98 % | TEMPERATURE: 97.5 F | SYSTOLIC BLOOD PRESSURE: 128 MMHG | HEART RATE: 60 BPM | WEIGHT: 131 LBS | BODY MASS INDEX: 21.14 KG/M2

## 2019-10-30 DIAGNOSIS — M25.552 HIP PAIN, LEFT: Primary | ICD-10-CM

## 2019-10-30 DIAGNOSIS — M54.50 ACUTE BILATERAL LOW BACK PAIN WITHOUT SCIATICA: ICD-10-CM

## 2019-10-30 PROCEDURE — 72170 X-RAY EXAM OF PELVIS: CPT

## 2019-10-30 PROCEDURE — 99214 OFFICE O/P EST MOD 30 MIN: CPT | Performed by: INTERNAL MEDICINE

## 2019-10-30 PROCEDURE — 72100 X-RAY EXAM L-S SPINE 2/3 VWS: CPT

## 2019-10-30 NOTE — RESULT ENCOUNTER NOTE
Nothing new is seen on the lumbar x-ray    There are changes at L5 due to previous surgery.  The bones are osteoporotic, which we knew about.  There are compression fractures which have been seen before.  There is scoliosis which has been seen before.  There is arthritis in the low back.  This may be contributing or causing the pain.  Continue with plan for physical therapy.  If pain is not improving, recommend follow-up with Dr. Kirby at [facility] orthopedic, who did your back surgery

## 2019-10-30 NOTE — PATIENT INSTRUCTIONS
1. Xray today  2. Try Advil 400-600 mg (2-3 pills) twice daily IN ADDITION to Tylenol.  Take it with food.  3. Start physical therapy  4. Come back if pain is not improving, certainly if getting worse.

## 2019-11-07 ENCOUNTER — HOSPITAL ENCOUNTER (OUTPATIENT)
Dept: PHYSICAL THERAPY | Facility: CLINIC | Age: 69
Setting detail: THERAPIES SERIES
End: 2019-11-07
Attending: INTERNAL MEDICINE
Payer: COMMERCIAL

## 2019-11-07 DIAGNOSIS — M25.552 HIP PAIN, LEFT: ICD-10-CM

## 2019-11-07 DIAGNOSIS — M54.50 ACUTE BILATERAL LOW BACK PAIN WITHOUT SCIATICA: ICD-10-CM

## 2019-11-07 PROCEDURE — 97140 MANUAL THERAPY 1/> REGIONS: CPT | Mod: GP | Performed by: PHYSICAL THERAPIST

## 2019-11-07 PROCEDURE — 97161 PT EVAL LOW COMPLEX 20 MIN: CPT | Mod: GP | Performed by: PHYSICAL THERAPIST

## 2019-11-07 PROCEDURE — 97110 THERAPEUTIC EXERCISES: CPT | Mod: GP | Performed by: PHYSICAL THERAPIST

## 2019-11-08 NOTE — PROGRESS NOTES
[facility]          OUTPATIENT PHYSICAL THERAPY ORTHOPEDIC EVALUATION  PLAN OF TREATMENT FOR OUTPATIENT REHABILITATION  (COMPLETE FOR INITIAL CLAIMS ONLY)  Patient's Last Name, First Name, M.I.  YOB: 1950  Nora Alarcon       Provider s Name:  [facility]   Medical Record No.  [id number]   Start of Care Date:  11/07/19   Onset Date:  10/19/19   Type:     _X__PT   ___OT   ___SLP Medical Diagnosis:  Low back pain, L hip pain     PT Diagnosis:  low back pain, hip weakness   Visits from SOC:  1      _________________________________________________________________________________  Plan of Treatment/Functional Goals:  stretching, strengthening, manual therapy           Goals     Goal Description: pt will be able to lift as needed for household tasks in 6wk  Target Date: 12/20/19       Goal Description: pt indep with home strengthenign program for self management of sx and continued fall reduction in 6wk  Target Date: 12/20/19          Therapy Frequency:  1 time/week  Predicted Duration of Therapy Intervention:  6wk    Kris Hoenk, PT                 I CERTIFY THE NEED FOR THESE SERVICES FURNISHED UNDER        THIS PLAN OF TREATMENT AND WHILE UNDER MY CARE     (Physician co-signature of this document indicates review and certification of the therapy plan).                       Certification Date From:  11/08/19   Certification Date To:  12/20/19    Referring Provider:  DR. Hill    Initial Assessment        See Epic Evaluation Start of Care Date: 11/07/19

## 2019-11-08 NOTE — PROGRESS NOTES
Nora Alarcon   PHYSICAL THERAPY EVALUATION    11/07/19 1400   General Information   Type of Visit Initial OP Ortho PT Evaluation   Start of Care Date 11/07/19   Referring Physician DR. Hill   Patient/Family Goals Statement decrease pain   Orders Evaluate and Treat   Date of Order 10/30/19   Certification Required? Yes   Medical Diagnosis L LBP, L hip pain   Body Part(s)   Body Part(s) Lumbar Spine/SI   Presentation and Etiology   Pertinent history of current problem (include personal factors and/or comorbidities that impact the POC) Was walking trying to keep up with her sisters, then was helping make beds and lifting corner of bed to put on sheet felt LBP.  Now has walking sticks to walk with. Sx increase standing , walking, lifting.   Onset date of current episode/exacerbation 10/19/19   Prior Level of Function   Functional Level Prior Comment walks daily, housework, stairs in her house   Current Level of Function   Patient role/employment history F. Retired   Fall Risk Screen   Fall screen completed by PT   Have you fallen 2 or more times in the past year? No   Have you fallen and had an injury in the past year? No   Is patient a fall risk? No   Lumbar Spine/SI Objective Findings   Posture scoliosis, decreased lumbar lordosis, forward flexed   Flexion %   Extension ROM 0*   Right Side Bending ROM minimal   Left Side Bending ROM minimal   Pelvic Screen appears level supine   Hip Screen hip ROM WFL , except limited hip exten   Hip Flexion (L2) Strength 4+   Hip Abduction Strength 4   Knee Flexion Strength 5   Knee Extension (L3) Strength 5   Ankle Dorsiflexion (L4) Strength 5   Hamstring Flexibility 70*   Quadricep Flexibility WFL   SLR neg   Palpation tender L lateral iliac crest, junctionm between ribs and pelvis which approximate each other   Planned Therapy Interventions   Planned Therapy Interventions stretching;strengthening;manual therapy   Clinical Impression   Criteria for Skilled Therapeutic  Interventions Met yes, treatment indicated   PT Diagnosis low back pain, hip weakness   Functional limitations due to impairments standing , walking, lifting, making bed   Clinical Presentation Stable/Uncomplicated   Clinical Decision Making (Complexity) Low complexity   Therapy Frequency 1 time/week   Predicted Duration of Therapy Intervention (days/wks) 6wk   Risk & Benefits of therapy have been explained Yes   Patient, Family & other staff in agreement with plan of care Yes   Education Assessment   Preferred Learning Style Listening   Barriers to Learning No barriers   ORTHO GOALS   PT Ortho Eval Goals 1;2   Ortho Goal 1   Goal Description pt will be able to lift as needed for household tasks in 6wk   Target Date 12/20/19   Ortho Goal 2   Goal Description pt indep with home strengthenign program for self management of sx and continued fall reduction in 6wk   Target Date 12/20/19   Total Evaluation Time   PT Eval, Low Complexity Minutes (76297) 20   Therapy Certification   Certification date from 11/08/19   Certification date to 12/20/19   Medical Diagnosis Low back pain, L hip pain   Kris Hoenk, PT #[id number]  [facility]  [phone]

## 2019-11-14 ENCOUNTER — HOSPITAL ENCOUNTER (OUTPATIENT)
Dept: PHYSICAL THERAPY | Facility: CLINIC | Age: 69
Setting detail: THERAPIES SERIES
End: 2019-11-14
Attending: INTERNAL MEDICINE
Payer: COMMERCIAL

## 2019-11-14 PROCEDURE — 97110 THERAPEUTIC EXERCISES: CPT | Mod: GP | Performed by: PHYSICAL THERAPIST

## 2019-11-14 PROCEDURE — 97140 MANUAL THERAPY 1/> REGIONS: CPT | Mod: GP | Performed by: PHYSICAL THERAPIST

## 2020-01-15 NOTE — PROGRESS NOTES
Nora Alarcon   PHYSICAL THERAPY DISCHARGE  11/14/19 1300   Signing Clinician's Name / Credentials   Signing clinician's name / credentials Kris Hoenk, EVA   Session Number   Session Number 2 cert needed, medium reva   Subjective Report   Subjective Report little better, little less sore, 5/10   Therapeutic Procedure/exercise   Therapeutic Procedures: strength, endurance, ROM, flexibillity minutes (93258) 15   Skilled Intervention stretches and strength ex for HEP   Treatment Detail  standing hip abd x15B, marching x10 B, squat x15, hip ext x15 B, SL clam L x20   Manual Therapy   Manual Therapy: Mobilization, MFR, MLD, friction massage minutes (50154) 15   Skilled Intervention STM for pain,tightness   Patient Response not tender   Treatment Detail STM L LB, iliac crest, in R SL   Education   Learner Patient   Readiness Acceptance   Method Literature;Explanation;Demonstration   Response Demonstrates Understanding;Verbalizes Understanding   Plan   Home program ex listed   Plan for next session 1x more, likely DC   Total Session Time   Timed Code Treatment Minutes 30   Total Treatment Time (sum of timed and untimed services) 30   Kris Hoenk, PT #[id number]  [facility]  [phone]

## 2020-04-08 ENCOUNTER — NURSE TRIAGE (OUTPATIENT)
Dept: INTERNAL MEDICINE | Facility: CLINIC | Age: 70
End: 2020-04-08

## 2020-04-08 ENCOUNTER — VIRTUAL VISIT (OUTPATIENT)
Dept: FAMILY MEDICINE | Facility: CLINIC | Age: 70
End: 2020-04-08
Payer: COMMERCIAL

## 2020-04-08 DIAGNOSIS — R19.7 DIARRHEA, UNSPECIFIED TYPE: Primary | ICD-10-CM

## 2020-04-08 PROCEDURE — 99442 ZZC PHYSICIAN TELEPHONE EVALUATION 11-20 MIN: CPT | Performed by: NURSE PRACTITIONER

## 2020-04-08 RX ORDER — LOPERAMIDE HCL 2 MG
2 CAPSULE ORAL 2 TIMES DAILY PRN
Qty: 15 CAPSULE | Refills: 0 | Status: SHIPPED | OUTPATIENT
Start: 2020-04-08

## 2020-04-08 NOTE — TELEPHONE ENCOUNTER
Reason for Call:  Other     Detailed comments: Pt is calling and is currently taking metamucil and calling on her BM's.  Wondering what else she could do.  No BM's for two days.    Phone Number Patient can be reached at: Home number on file [phone] (home)    Best Time: any    Can we leave a detailed message on this number? YES    Call taken on 4/8/2020 at 8:56 AM by Arcelia Alonso

## 2020-04-08 NOTE — TELEPHONE ENCOUNTER
Additional Information    Negative: Patient sounds very sick or weak to the triager    Negative: Constant abdominal pain lasting > 2 hours    Negative: Vomiting bile (green color)    Negative: Vomiting and abdomen looks much more swollen than usual    Negative: Rectal pain or fullness from fecal impaction (rectum full of stool) and NOT better after SITZ bath, suppository or enema    Negative: Abdomen is more swollen than usual    Negative: Last bowel movement (BM) > 4 days ago    Negative: Leaking stool    Negative: Intermittent mild abdominal pain and fever    Negative: Unable to have a bowel movement (BM) without manually removing stool (using finger to pull out stool or perform disimpaction)    Negative: Unable to have a bowel movement (BM) without using a laxative, suppository, or enema    Negative: Constipation persists > 1 week and no improvement after using CARE ADVICE    Negative: Weight loss greater than 10 pounds (5 kg) and not dieting    Negative: Pencil-like, narrow stools    Negative: Patient wants to be seen    Negative: Uses laxative (e.g., PEG / Miralax. milk of magnesia) or enema more than once a month    Negative: Constipation is a recurrent ongoing problem (i.e., < 3 BMs / week or straining > 25% of the time)    Negative: Minor bleeding from rectum (e.g., blood just on toilet paper, few drops, streaks on surface of normal formed BM) occurs more than twice    Negative: Mild constipation    Negative: Rectal pain    Negative: Rectal pain or fullness from fecal impaction (rectum full of stool) has not tried a SITZ bath, suppository, or enema    Negative: Minor bleeding from rectum (e.g., blood just on toilet paper, few drops, streaks on surface of normal formed BM), and only 1-2 times    Negative: Shock suspected (e.g., cold/pale/clammy skin, too weak to stand, low BP, rapid pulse)    Negative: Difficult to awaken or acting confused (e.g., disoriented, slurred speech)    Negative: Sounds like a  "life-threatening emergency to the triager    Negative: SEVERE abdominal pain (e.g., excruciating) and present > 1 hour    Negative: SEVERE abdominal pain and age > 60    Negative: Bloody, black, or tarry bowel movements    Negative: SEVERE diarrhea (e.g., 7 or more times / day more than normal) and age > 60 years    Negative: Constant abdominal pain lasting > 2 hours    Negative: Drinking very little and has signs of dehydration (e.g., no urine > 12 hours, very dry mouth, very lightheaded)    Negative: Patient sounds very sick or weak to the triager    Negative: SEVERE diarrhea (e.g., 7 or more times / day more than normal) and present > 24 hours (1 day)    Negative: MODERATE diarrhea (e.g., 4-6 times / day more than normal) and present > 48 hours (2 days)    Negative: MODERATE diarrhea (e.g., 4-6 times / day more than normal) and age > 70 years    Negative: Abdominal pain  (Exception: pain clears completely with each passage of diarrhea stool)    Negative: Fever > 101 F (38.3 C)    Negative: Blood in the stool    Negative: Mucus or pus in stool has been present > 2 days and diarrhea is more than mild    Negative: Weak immune system (e.g., HIV positive, cancer chemo, splenectomy, organ transplant, chronic steroids)    Negative: Travel to a foreign country in past month    Negative: Recent antibiotic therapy (i.e., within last 2 months) and diarrhea present > 3 days since antibiotic was stopped    Negative: Recent hospitalization and diarrhea present > 3 days    Negative: Tube feedings (e.g., nasogastric, g-tube, j-tube)    MILD-MODERATE diarrhea (e.g., 1-6 times / day more than normal)    Answer Assessment - Initial Assessment Questions  1. STOOL PATTERN OR FREQUENCY: \"How often do you pass bowel movements (BMs)?\"  (Normal range: tid to q 3 days)  \"When was the last BM passed?\"        04/06/20  2. STRAINING: \"Do you have to strain to have a BM?\"       no  3. RECTAL PAIN: \"Does your rectum hurt when the stool comes " "out?\" If so, ask: \"Do you have hemorrhoids? How bad is the pain?\"  (Scale 1-10; or mild, moderate, severe)      No  4. STOOL COMPOSITION: \"Are the stools hard?\"       Liquid stools  5. BLOOD ON STOOLS: \"Has there been any blood on the toilet tissue or on the surface of the BM?\" If so, ask: \"When was the last time?\"       No  6. CHRONIC CONSTIPATION: \"Is this a new problem for you?\"  If no, ask: How long have you had this problem?\" (days, weeks, months)       New problem  7. CHANGES IN DIET: \"Have there been any recent changes in your diet?\"       no  8. MEDICATIONS: \"Have you been taking any new medications?\"      Metamucil  9. LAXATIVES: \"Have you been using any laxatives or enemas?\"  If yes, ask \"What, how often, and when was the last time?\"      No  10. CAUSE: \"What do you think is causing the constipation?\"         Unsure  11. OTHER SYMPTOMS: \"Do you have any other symptoms?\" (e.g., abdominal pain, fever, vomiting)        no  12. PREGNANCY: \"Is there any chance you are pregnant?\" \"When was your last menstrual period?\"        n/a    Protocols used: CONSTIPATION-A-OH, DIARRHEA-A-OH    "

## 2020-04-08 NOTE — PROGRESS NOTES
"Subjective     Nora Alarcon is a 69 year old female who is being evaluated via a billable telephone visit.      The patient has been notified of following:     \"This telephone visit will be conducted via a call between you and your physician/provider. We have found that certain health care needs can be provided without the need for a physical exam.  This service lets us provide the care you need with a short phone conversation.  If a prescription is necessary we can send it directly to your pharmacy.  If lab work is needed we can place an order for that and you can then stop by our lab to have the test done at a later time.    Telephone visits are billed at different rates depending on your insurance coverage. During this emergency period, for some insurers they may be billed the same as an in-person visit.  Please reach out to your insurance provider with any questions.    If during the course of the call the physician/provider feels a telephone visit is not appropriate, you will not be charged for this service.\"    Patient has given verbal consent for Telephone visit?  Yes    Nora Alarcon complains of   Chief Complaint   Patient presents with     Diarrhea       ALLERGIES  Penicillins; Seasonal allergies; and Tegretol [carbamazepine]        Diarrhea      Duration: since April 2 nd    Description:       Consistency of stool: watery, loose and mucousy       Blood in stool: no        Number of loose stools past 24 hours: 3-4    Intensity:  moderate    Accompanying signs and symptoms:       Fever: no        Nausea/vomitting: no        Abdominal pain: no        Weight loss: no     History (recent antibiotics or travel/ill contacts/med changes/testing done): none     Precipitating or alleviating factors: None    Therapies tried and outcome: Metamucil- has not been helpful. Takes Metamucil regularly daily morning since she has history of diverticular disease           Reviewed and updated as needed this visit by " Provider  Allergies  Meds  Problems  Med Hx  Surg Hx         Review of Systems   ROS COMP: Constitutional, HEENT, cardiovascular, pulmonary, gi and gu systems are negative, except as otherwise noted.       Objective   Reported vitals:  There were no vitals taken for this visit.   Psych: Alert and oriented times 3; coherent speech, normal   rate and volume, able to articulate logical thoughts, able   to abstract reason, no tangential thoughts, no hallucinations   or delusions  Her affect is normal     Diagnostic Test Results:  Labs reviewed in Epic        Assessment/Plan:  1. Diarrhea, unspecified type  -unclear etiology, she had normal colonoscopy back in 2015 except for some sigmoid colon diverticula. The patient is on daily Metamucil due to diverticular disease. Denies abdominal pain, nausea, vomiting, fevers, melena and blood in stools   -recommended symptomatic treatment with twice daily as needed Imodium 2 mg, BRAT diet, drink enough fluids, if no improvement in 2-3 days schedule lab appointment for stool tests to rule out possible infectious etiology. But giving her symptoms, denies abdominal pain, I think infectious etiology is unlikely.    - Clostridium difficile toxin B PCR; Future  - Enteric Bacteria and Virus Panel by [facility] Stool; Future  - loperamide (IMODIUM A-D) 2 MG capsule; Take 1 capsule (2 mg) by mouth 2 times daily as needed for diarrhea  Dispense: 15 capsule; Refill: 0    Return in about 2 days (around 4/10/2020), or if symptoms worsen or fail to improve, for Lab Work.      Phone call duration:  13 minutes    JESUS Rosa CNP

## 2020-04-10 ENCOUNTER — TELEPHONE (OUTPATIENT)
Dept: INTERNAL MEDICINE | Facility: CLINIC | Age: 70
End: 2020-04-10

## 2020-04-10 NOTE — TELEPHONE ENCOUNTER
S-(situation): I spoke with pt.    Diarrhea started on 4/2/20.  Pt was seen 4/8/20.  Pt was prescribed Imodium.  Pt updates that she has had a normal bowel movement since calling in the morning and that she has no further concerns.    B-(background): per above.    A-(assessment): constipation/diarrhea concerns    R-(recommendations): No further concerns at this time.  Maricruz Thorpe RN

## 2020-04-10 NOTE — TELEPHONE ENCOUNTER
Reason for Call:  Constipation    Detailed comments: patient is calling and stating that she had a telephone visit with ELISA Mcdaniel and was given something for her diarrhea. She now cannot seem to have a bowel movement. Please advise.    Phone Number Patient can be reached at: Home number on file [phone] (home)    Best Time: any    Can we leave a detailed message on this number? YES   Pavithra Muro  Clinic Station        Call taken on 4/10/2020 at 9:07 AM by Pavithra Ponce

## 2020-06-17 ENCOUNTER — OFFICE VISIT (OUTPATIENT)
Dept: DERMATOLOGY | Facility: CLINIC | Age: 70
End: 2020-06-17
Payer: COMMERCIAL

## 2020-06-17 VITALS — OXYGEN SATURATION: 98 % | HEART RATE: 94 BPM | DIASTOLIC BLOOD PRESSURE: 78 MMHG | SYSTOLIC BLOOD PRESSURE: 160 MMHG

## 2020-06-17 DIAGNOSIS — L82.1 SEBORRHEIC KERATOSIS: ICD-10-CM

## 2020-06-17 DIAGNOSIS — Z85.828 HISTORY OF SKIN CANCER: Primary | ICD-10-CM

## 2020-06-17 DIAGNOSIS — D18.01 ANGIOMA OF SKIN: ICD-10-CM

## 2020-06-17 DIAGNOSIS — L81.4 LENTIGO: ICD-10-CM

## 2020-06-17 DIAGNOSIS — D23.9 DERMAL NEVUS: ICD-10-CM

## 2020-06-17 PROCEDURE — 99213 OFFICE O/P EST LOW 20 MIN: CPT | Performed by: DERMATOLOGY
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
## 2020-07-03 ENCOUNTER — HOSPITAL ENCOUNTER (OUTPATIENT)
Facility: CLINIC | Age: 70
Setting detail: SPECIMEN
Discharge: HOME | End: 2020-07-03
Admitting: PSYCHIATRY & NEUROLOGY
Payer: COMMERCIAL

## 2020-07-03 DIAGNOSIS — F31.74 MANIC DISORDER, RECURRENT EPISODE, IN FULL REMISSION (H): Primary | ICD-10-CM

## 2020-07-03 LAB
ALBUMIN SERPL-MCNC: 3.7 G/DL (ref 3.4–5)
ALP SERPL-CCNC: 56 U/L (ref 40–150)
ALT SERPL W P-5'-P-CCNC: 30 U/L (ref 0–50)
ANION GAP SERPL CALCULATED.3IONS-SCNC: 3 MMOL/L (ref 3–14)
AST SERPL W P-5'-P-CCNC: 27 U/L (ref 0–45)
BASOPHILS # BLD AUTO: 0 10E9/L (ref 0–0.2)
BASOPHILS NFR BLD AUTO: 0.8 %
BILIRUB SERPL-MCNC: 0.4 MG/DL (ref 0.2–1.3)
BUN SERPL-MCNC: 35 MG/DL (ref 7–30)
CALCIUM SERPL-MCNC: 8.8 MG/DL (ref 8.5–10.1)
CHLORIDE SERPL-SCNC: 109 MMOL/L (ref 94–109)
CO2 SERPL-SCNC: 29 MMOL/L (ref 20–32)
CREAT SERPL-MCNC: 0.81 MG/DL (ref 0.52–1.04)
DIFFERENTIAL METHOD BLD: ABNORMAL
EOSINOPHIL # BLD AUTO: 0.1 10E9/L (ref 0–0.7)
EOSINOPHIL NFR BLD AUTO: 2.6 %
ERYTHROCYTE [DISTWIDTH] IN BLOOD BY AUTOMATED COUNT: 13.5 % (ref 10–15)
GFR SERPL CREATININE-BSD FRML MDRD: 74 ML/MIN/{1.73_M2}
GLUCOSE SERPL-MCNC: 118 MG/DL (ref 70–99)
HCT VFR BLD AUTO: 41.1 % (ref 35–47)
HGB BLD-MCNC: 13.7 G/DL (ref 11.7–15.7)
LYMPHOCYTES # BLD AUTO: 0.6 10E9/L (ref 0.8–5.3)
LYMPHOCYTES NFR BLD AUTO: 16.2 %
MCH RBC QN AUTO: 34.8 PG (ref 26.5–33)
MCHC RBC AUTO-ENTMCNC: 33.3 G/DL (ref 31.5–36.5)
MCV RBC AUTO: 104 FL (ref 78–100)
MONOCYTES # BLD AUTO: 0.7 10E9/L (ref 0–1.3)
MONOCYTES NFR BLD AUTO: 18.6 %
NEUTROPHILS # BLD AUTO: 2.4 10E9/L (ref 1.6–8.3)
NEUTROPHILS NFR BLD AUTO: 61.8 %
PLATELET # BLD AUTO: 188 10E9/L (ref 150–450)
POTASSIUM SERPL-SCNC: 4.2 MMOL/L (ref 3.4–5.3)
PROT SERPL-MCNC: 7.9 G/DL (ref 6.8–8.8)
RBC # BLD AUTO: 3.94 10E12/L (ref 3.8–5.2)
SODIUM SERPL-SCNC: 141 MMOL/L (ref 133–144)
VALPROATE SERPL-MCNC: 73 MG/L (ref 50–100)
WBC # BLD AUTO: 3.9 10E9/L (ref 4–11)

## 2020-07-03 PROCEDURE — 80053 COMPREHEN METABOLIC PANEL: CPT | Performed by: PSYCHIATRY & NEUROLOGY

## 2020-07-03 PROCEDURE — 85025 COMPLETE CBC W/AUTO DIFF WBC: CPT | Performed by: PSYCHIATRY & NEUROLOGY

## 2020-07-03 PROCEDURE — 36415 COLL VENOUS BLD VENIPUNCTURE: CPT | Performed by: PSYCHIATRY & NEUROLOGY

## 2020-07-03 PROCEDURE — 80164 ASSAY DIPROPYLACETIC ACD TOT: CPT | Performed by: PSYCHIATRY & NEUROLOGY

## 2020-07-30 NOTE — PROGRESS NOTES
Subjective     Nora Alarcon is a 69 year old female who presents to clinic today for the following health issues:    HPI       Patient is here to discuss getting a colonoscopy and who should perform the procedure.    History of 4 mm serrated adenoma removed in 2002.  Scope in 2005 and 2015 was normal  5 mm polyp removed in 2005 - path unknown.  Recommended follow-up in 5 years    Hypertension:   On losartan in 2016, stopped due to CARIN  BP Readings from Last 6 Encounters:   08/05/20 (!) 154/94   06/17/20 (!) 160/78   10/30/19 128/80   10/11/19 116/72   10/03/19 110/80   08/02/19 136/78     Left hip pain: no recent fall.  Started 3 months ago, tra thayer chart review, we did visit for this in Oct.  Intermittent but daily.  Rest makes it worse, activity improves.  No pain with laying on the hip at night.  Is not limiting activities.  Uses Aleve or Tylenol and that helps some.  --she did physical therapy and it helped.    Xray pelviz 10/2019 - IMPRESSION: No fracture or acute abnormality is seen. Again  demonstrated are mild degenerative changes in both hips. No other  abnormality is noted. I see no definite change since the previous  examination.     Current Outpatient Medications   Medication Sig Dispense Refill     Acetaminophen (TYLENOL PO) Take 1,000 mg by mouth 2 times daily as needed for mild pain or fever        ASPIRIN PO Take 325 mg by mouth daily        Divalproex Sodium (DEPAKOTE PO) Take 500 mg by mouth 2 times daily       loperamide (IMODIUM A-D) 2 MG capsule Take 1 capsule (2 mg) by mouth 2 times daily as needed for diarrhea 15 capsule 0     MIRTAZAPINE PO Take 45 mg by mouth At Bedtime       order for DME Equipment being ordered: bilateral below the knee LILIBETH stockings 2 each 2     simvastatin (ZOCOR) 10 MG tablet Take 1 tablet (10 mg) by mouth At Bedtime 90 tablet 3     tolterodine ER (DETROL LA) 4 MG 24 hr capsule TAKE 1 CAPSULE BY MOUTH ONCE DAILY 90 capsule 2     TYMLOS 3120 MCG/1.56ML SOPN injection  "Inject 80 mcg Subcutaneous daily        olopatadine (PATADAY) 0.2 % ophthalmic solution Place 1 drop into both eyes daily (Patient not taking: Reported on 8/5/2020) 2.5 mL 11     trimethoprim-polymyxin b (POLYTRIM) 20254-3.1 UNIT/ML-% ophthalmic solution Place 1-2 drops into both eyes every 6 hours (Patient not taking: Reported on 4/8/2020) 3 mL 0     Valproate Sodium (VALPROIC ACID) 250 MG/5ML 500 mg by Enteral route daily 10 mL (500 mg total) by Enteral Tube route 2 (two) times a day.         Reviewed and updated as needed this visit by Provider         Review of Systems   Constitutional, HEENT, cardiovascular, pulmonary, gi and gu systems are negative, except as otherwise noted.      Objective    BP (!) 154/94   Pulse 79   Temp 99.1  F (37.3  C) (Tympanic)   Resp 16   Ht 1.676 m (5' 6\")   Wt 54.9 kg (121 lb)   SpO2 98%   BMI 19.53 kg/m    Body mass index is 19.53 kg/m .  Physical Exam   GENERAL APPEARANCE: alert, no distress and frail, anxious  MS: extremities normal- no gross deformities noted and 5/5 strength bilateral hip, normal ROM          Assessment & Plan     1. Essential hypertension with goal blood pressure less than 140/90 = uncontrolled.  Restart med.  RN blood pressure check 2-4 weeks  - losartan (COZAAR) 25 MG tablet; Take 1 tablet (25 mg) by mouth daily  Dispense: 90 tablet; Refill: 3    2. Left hip pain - mild OA. Not limiting life and not severe.  Home care as below    3. Screen for colon cancer - due  - GASTROENTEROLOGY ADULT REF PROCEDURE ONLY; Future       Patient Instructions   1. You are due for a colonoscopy.  Please call [phone] to schedule  2. Because of higher blood pressure, recommend to start losartan 25 mg once daily  3. Your blood pressure was elevated today.  Please come back to clinic in 2 weeks for (free) Nurse visit to recheck blood pressure.    4. Because the pain is not severe and you managing your activities, recommend ongoing treatment at home as " below.        Treatments for arthritis:  1. Over the counter pain medications   A. Tylenol (Acetaminophen) 500-1000 mg 3 x day as needed   B. Ibuprofen (or other NSAIDs such as Aleve, Aspirin, Advil) 400-600 mg 3 x day as needed - can alternate with Tylenol    C. Supplement such as Glucosamine with Chondroitin   2. Over the counter topical   A. Aspercream with Lidocaine, Capsaicin, Icy Hot, Biofreeze, Salon Pas, Blue Emu  3. Prescription pain medications (NSAIDS)   A. Celebrex 100-200 mg 2 x day as needed.  Similar to Ibuprofen, cannot take along with Celebrex   B. Prescription Ibuprofen  4. Prescription topical NSAID   A. Voltaren gel (topical anti-inflammatory)  5. Physical therapy   6. Heat, ice, stretching, braces, modified activity  7. Sports Medicine or Orthopedics for Injections (steroids, 'rooster comb')  8. Joint replacement          No follow-ups on file.    Brandie Hill,   [facility]

## 2020-08-05 ENCOUNTER — OFFICE VISIT (OUTPATIENT)
Dept: FAMILY MEDICINE | Facility: CLINIC | Age: 70
End: 2020-08-05
Payer: COMMERCIAL

## 2020-08-05 VITALS
WEIGHT: 121 LBS | OXYGEN SATURATION: 98 % | RESPIRATION RATE: 16 BRPM | SYSTOLIC BLOOD PRESSURE: 160 MMHG | HEIGHT: 66 IN | DIASTOLIC BLOOD PRESSURE: 90 MMHG | BODY MASS INDEX: 19.44 KG/M2 | TEMPERATURE: 99.1 F | HEART RATE: 79 BPM

## 2020-08-05 DIAGNOSIS — I10 ESSENTIAL HYPERTENSION WITH GOAL BLOOD PRESSURE LESS THAN 140/90: Primary | ICD-10-CM

## 2020-08-05 DIAGNOSIS — M25.552 LEFT HIP PAIN: ICD-10-CM

## 2020-08-05 DIAGNOSIS — Z12.11 SCREEN FOR COLON CANCER: ICD-10-CM

## 2020-08-05 PROCEDURE — 99214 OFFICE O/P EST MOD 30 MIN: CPT | Performed by: INTERNAL MEDICINE

## 2020-08-05 RX ORDER — LOSARTAN POTASSIUM 25 MG/1
25 TABLET ORAL DAILY
Qty: 90 TABLET | Refills: 3 | Status: SHIPPED | OUTPATIENT
Start: 2020-08-05 | End: 2021-06-30

## 2020-08-05 ASSESSMENT — MIFFLIN-ST. JEOR: SCORE: 1085.6

## 2020-08-05 NOTE — PATIENT INSTRUCTIONS
1. You are due for a colonoscopy.  Please call [phone] to schedule  2. Because of higher blood pressure, recommend to start losartan 25 mg once daily  3. Your blood pressure was elevated today.  Please come back to clinic in 2 weeks for (free) Nurse visit to recheck blood pressure.    4. Because the pain is not severe and you managing your activities, recommend ongoing treatment at home as below.        Treatments for arthritis:  1. Over the counter pain medications   A. Tylenol (Acetaminophen) 500-1000 mg 3 x day as needed   B. Ibuprofen (or other NSAIDs such as Aleve, Aspirin, Advil) 400-600 mg 3 x day as needed - can alternate with Tylenol    C. Supplement such as Glucosamine with Chondroitin   2. Over the counter topical   A. Aspercream with Lidocaine, Capsaicin, Icy Hot, Biofreeze, Salon Pas, Blue Emu  3. Prescription pain medications (NSAIDS)   A. Celebrex 100-200 mg 2 x day as needed.  Similar to Ibuprofen, cannot take along with Celebrex   B. Prescription Ibuprofen  4. Prescription topical NSAID   A. Voltaren gel (topical anti-inflammatory)  5. Physical therapy   6. Heat, ice, stretching, braces, modified activity  7. Sports Medicine or Orthopedics for Injections (steroids, 'rooster comb')  8. Joint replacement

## 2020-08-05 NOTE — NURSING NOTE
"Initial BP (!) 154/94   Pulse 79   Temp 99.1  F (37.3  C) (Tympanic)   Resp 16   Ht 1.676 m (5' 6\")   Wt 54.9 kg (121 lb)   SpO2 98%   BMI 19.53 kg/m   Estimated body mass index is 19.53 kg/m  as calculated from the following:    Height as of this encounter: 1.676 m (5' 6\").    Weight as of this encounter: 54.9 kg (121 lb). .    Kylie Juárez, SYLVESTER    "

## 2020-08-18 ENCOUNTER — OFFICE VISIT (OUTPATIENT)
Dept: AUDIOLOGY | Facility: CLINIC | Age: 70
End: 2020-08-18

## 2020-08-18 DIAGNOSIS — H90.3 SENSORINEURAL HEARING LOSS, BILATERAL: Primary | ICD-10-CM

## 2020-08-18 PROCEDURE — V5014 HEARING AID REPAIR/MODIFYING: HCPCS | Performed by: AUDIOLOGIST

## 2020-08-18 PROCEDURE — 99207 ZZC NO CHARGE LOS: CPT | Performed by: AUDIOLOGIST

## 2020-08-18 NOTE — PROGRESS NOTES
[facility]        SUBJECTIVE:  Nora Alarcon, 69 year old requests in office repair of her 2017 [employer] B50-R hearing aids. She reports she is having trouble with right ear feedback.     OBJECTIVE:  An otoscopic examination was done and revealed cerumen bilaterally. Ears were cleaned with a curette.   Replaced wax guards and medium closed domes bilaterally. Verified hearing aid functionality.    No feedback noticed in office.    ASSESSMENT/PLAN:    Return to clinic as needed.     Kathe Sneed M.A. -[facility], [facility][id number]

## 2020-08-20 ENCOUNTER — TELEPHONE (OUTPATIENT)
Dept: INTERNAL MEDICINE | Facility: CLINIC | Age: 70
End: 2020-08-20

## 2020-08-20 ENCOUNTER — ALLIED HEALTH/NURSE VISIT (OUTPATIENT)
Dept: FAMILY MEDICINE | Facility: CLINIC | Age: 70
End: 2020-08-20
Payer: COMMERCIAL

## 2020-08-20 VITALS
RESPIRATION RATE: 12 BRPM | HEART RATE: 80 BPM | SYSTOLIC BLOOD PRESSURE: 150 MMHG | DIASTOLIC BLOOD PRESSURE: 74 MMHG | OXYGEN SATURATION: 98 %

## 2020-08-20 DIAGNOSIS — I10 ESSENTIAL HYPERTENSION WITH GOAL BLOOD PRESSURE LESS THAN 140/90: Primary | ICD-10-CM

## 2020-08-20 PROCEDURE — 99207 ZZC NO CHARGE NURSE ONLY: CPT

## 2020-08-20 NOTE — TELEPHONE ENCOUNTER
"S: BP check    B: 8/5/20 Office visit with medication change:  1. Essential hypertension with goal blood pressure less than 140/90 = uncontrolled.  Restart med.  RN blood pressure check 2-4 weeks  - losartan (COZAAR) 25 MG tablet; Take 1 tablet (25 mg) by mouth daily  Dispense: 90 tablet; Refill: 3    A: Nora Alarcon is a 69 year old year old patient who comes in today for a Blood Pressure check because of new medication, started Losartan 25 mg daily. .  Vital Signs as repeated by RN Twice.  Patient is taking medication as prescribed  Patient is tolerating medications well.  Patient is monitoring Blood Pressure at home.  Average readings if yes are 130/70, taking BPmidday, taking Losartan in the evening.   Current complaints: none    Vital Signs 8/20/2020 8/20/2020   Systolic 148 150   Diastolic 96 74   Pulse 80 - Apical    Temperature     Respirations 12    Weight (LB)     Height     BMI (Calculated)     Pain     O2 98% on room air      Patient reports she has \"white coat syndrome\"; patient is tearful off and on throughout the visit at random. Patient asks about talking with a therapist. Gave patient Gilda HORN  And Elle MALONE [employer] and she will check with her insurance for coverage and may call for a referral.   [employer] [place]     R:   Disposition:  Routed to provider in telephone encounter.   "

## 2020-08-20 NOTE — TELEPHONE ENCOUNTER
Patient is frequently anxious in the doctor's office.  Recommend to continue current medications, since her blood pressure is better controlled at home

## 2020-08-20 NOTE — PROGRESS NOTES
"Nora Alarcon is a 69 year old year old patient who comes in today for a Blood Pressure check because of new medication, started Losartan 25 mg daily. .  Vital Signs as repeated by RN Twice.  Patient is taking medication as prescribed  Patient is tolerating medications well.  Patient is monitoring Blood Pressure at home.  Average readings if yes are 130/70, taking BPmidday, taking Losartan in the evening.   Current complaints: none    Vital Signs 8/20/2020 8/20/2020   Systolic 148 150   Diastolic 96 74   Pulse 80    Temperature     Respirations     Weight (LB)     Height     BMI (Calculated)     Pain     O2 98      Patient reports she has \"white coat syndrome\"; patient is tearful off and on throughout the visit at random. Patient asks about talking with a therapist. Gave patient Gilda HORN  And Elle MALONE Business Cards and she will check with her insurance for coverage and may call for a referral.   [employer] [place]     Disposition:  Routed to provider in telephone encounter.     "

## 2020-09-22 DIAGNOSIS — Z11.59 ENCOUNTER FOR SCREENING FOR OTHER VIRAL DISEASES: Primary | ICD-10-CM

## 2020-09-28 DIAGNOSIS — E78.5 HYPERLIPIDEMIA LDL GOAL <130: ICD-10-CM

## 2020-09-29 RX ORDER — SIMVASTATIN 10 MG
TABLET ORAL
Qty: 90 TABLET | Refills: 3 | Status: SHIPPED | OUTPATIENT
Start: 2020-09-29 | End: 2021-09-23

## 2020-09-29 NOTE — TELEPHONE ENCOUNTER
"Requested Prescriptions   Pending Prescriptions Disp Refills     simvastatin (ZOCOR) 10 MG tablet [Pharmacy Med Name: SIMVASTATIN 10MG TABLET] 90 tablet 3     Sig: TAKE 1 TABLET BY MOUTH DAILY AT BEDTIME       Statins Protocol Passed - 9/28/2020  1:00 AM        Passed - LDL on file in past 12 months     Recent Labs   Lab Test 10/03/19  1021   LDL 65             Passed - No abnormal creatine kinase in past 12 months     No lab results found.             Passed - Recent (12 mo) or future (30 days) visit within the authorizing provider's specialty     Patient has had an office visit with the authorizing provider or a provider within the authorizing providers department within the previous 12 mos or has a future within next 30 days. See \"Patient Info\" tab in inbasket, or \"Choose Columns\" in Meds & Orders section of the refill encounter.              Passed - Medication is active on med list        Passed - Patient is age 18 or older        Passed - No active pregnancy on record        Passed - No positive pregnancy test in past 12 months             "

## 2020-09-30 ENCOUNTER — TELEPHONE (OUTPATIENT)
Dept: INTERNAL MEDICINE | Facility: CLINIC | Age: 70
End: 2020-09-30

## 2020-09-30 NOTE — TELEPHONE ENCOUNTER
S-(situation): rash on right knee x 2 days.  Red, raised.  No sores or lesions.  Source unknown.  Not red or painful.    Pt wants to be seen.    B-(background): per above.     A-(assessment): rash on right knee    R-(recommendations):   Pt wants to be seen.  Alex is ringing; someone at her door.  Pt will call back to schedule her appt.    Maricruz Thorpe RN

## 2020-09-30 NOTE — TELEPHONE ENCOUNTER
Reason for call:  Patient reporting a symptom    Symptom or request: Rash    Duration (how long have symptoms been present): A few days    Have you been treated for this before? No    Additional comments: 50 cent size on right knee    Phone Number patient can be reached at:  Home number on file [phone] (home)    Best Time:  Any    Can we leave a detailed message on this number:  YES    Call taken on 9/30/2020 at 10:24 AM by Pavithra Abdul

## 2020-10-01 ENCOUNTER — OFFICE VISIT (OUTPATIENT)
Dept: FAMILY MEDICINE | Facility: CLINIC | Age: 70
End: 2020-10-01
Payer: COMMERCIAL

## 2020-10-01 VITALS
RESPIRATION RATE: 16 BRPM | DIASTOLIC BLOOD PRESSURE: 84 MMHG | HEIGHT: 66 IN | OXYGEN SATURATION: 98 % | HEART RATE: 81 BPM | BODY MASS INDEX: 19.75 KG/M2 | WEIGHT: 122.9 LBS | SYSTOLIC BLOOD PRESSURE: 128 MMHG | TEMPERATURE: 98.8 F
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
## 2020-10-01 DIAGNOSIS — Z23 NEED FOR PROPHYLACTIC VACCINATION AND INOCULATION AGAINST INFLUENZA: ICD-10-CM

## 2020-10-01 DIAGNOSIS — S80.261A INSECT BITE OF RIGHT KNEE, INITIAL ENCOUNTER: Primary | ICD-10-CM

## 2020-10-01 PROCEDURE — 99213 OFFICE O/P EST LOW 20 MIN: CPT | Mod: 25 | Performed by: NURSE PRACTITIONER

## 2020-10-01 PROCEDURE — 90662 IIV NO PRSV INCREASED AG IM: CPT | Performed by: NURSE PRACTITIONER

## 2020-10-01 PROCEDURE — 90471 IMMUNIZATION ADMIN: CPT | Performed by: NURSE PRACTITIONER

## 2020-10-01 RX ORDER — TRIAMCINOLONE ACETONIDE 1 MG/G
CREAM TOPICAL 2 TIMES DAILY
Qty: 15 G | Refills: 0 | Status: SHIPPED | OUTPATIENT
Start: 2020-10-01 | End: 2021-02-26

## 2020-10-01 RX ORDER — TERIPARATIDE 250 UG/ML
INJECTION, SOLUTION SUBCUTANEOUS
COMMUNITY
Start: 2020-03-17 | End: 2023-03-15

## 2020-10-01 ASSESSMENT — MIFFLIN-ST. JEOR: SCORE: 1094.22

## 2020-10-01 NOTE — PROGRESS NOTES
"Subjective     Nora Alarcon is a 69 year old female who presents to clinic today for the following health issues:    HPI         Rash  Onset/Duration: 3 days   Description  Location: right knee   Character: red, raised  and bumpy and is about the size of a golf ball.   Itching: no  Intensity:  Mild- no pain   Progression of Symptoms:  same  Accompanying signs and symptoms:   Fever: no  Body aches or joint pain: no  Sore throat symptoms: no  Recent cold symptoms: no  History:           Previous episodes of similar rash: None  New exposures:  None  Recent travel: no  Exposure to similar rash: no  Precipitating or alleviating factors: none   Therapies tried and outcome: neosporin didn't help         Review of Systems   Constitutional, HEENT, cardiovascular, pulmonary, gi and gu systems are negative, except as otherwise noted.      Objective    /84   Pulse 81   Temp 98.8  F (37.1  C) (Tympanic)   Resp 16   Ht 1.676 m (5' 6\")   Wt 55.7 kg (122 lb 14.4 oz)   SpO2 98%   BMI 19.84 kg/m    Body mass index is 19.84 kg/m .  Physical Exam   GENERAL: healthy, alert and no distress  SKIN: on the lateral side of the right knee there is two small papules with surrounding circular erythema about 1.5 inches in diameter, the area of rash is not warm to touch, non-tender appears  to be local reaction from insect bites            Assessment & Plan     Insect bite of right knee, initial encounter    - triamcinolone (KENALOG) 0.1 % external cream; Apply topically 2 times daily    Need for prophylactic vaccination and inoculation against influenza    - FLUZONE HIGH DOSE 65+  [90422]  - ADMIN INFLUENZA (For MEDICARE Patients ONLY) []        See Patient Instructions    Return in about 1 week (around 10/8/2020), or if symptoms worsen or fail to improve.    JESUS Rosa [facility]    "

## 2020-10-02 ENCOUNTER — TELEPHONE (OUTPATIENT)
Dept: INTERNAL MEDICINE | Facility: CLINIC | Age: 70
End: 2020-10-02

## 2020-10-02 NOTE — TELEPHONE ENCOUNTER
Patient is contacted and she is having a colonoscopy on 10/9/2020 with Dr. Boone.  She takes  mg with apple sauce and wondering if this is ok.?  Will forward to the specialty clinic for comment. Arcelia VARGAS RN

## 2020-10-02 NOTE — TELEPHONE ENCOUNTER
Reason for call:    Symptom or request:     Patient called stating that she is taking aspirin 325 mg, however she is scheduled for a colonoscopy for 10/09/2020.      Patient reports when she takes medication she take them with apple sauce. As other liquids cause her mouth to become dry, making swallowing tablets difficult. --FYI    Best Time:  any    Can we leave a detailed message on this number?  YES     Bonita BEAR  Station

## 2020-10-05 ENCOUNTER — TELEPHONE (OUTPATIENT)
Dept: INTERNAL MEDICINE | Facility: CLINIC | Age: 70
End: 2020-10-05

## 2020-10-05 NOTE — TELEPHONE ENCOUNTER
Venu Gaona DO Hughs, Renee L, RN 5 hours ago (9:12 AM)     She should go down to 81mg.     Thank you     Vance    Message text

## 2020-10-05 NOTE — TELEPHONE ENCOUNTER
Patient advised ok to continue on asa. Also she questions taking her evening meds with applesauce . Advised she use the water only once she is at the point of drinking clear liquids.  Yani Onofre RN

## 2020-10-05 NOTE — TELEPHONE ENCOUNTER
Reason for call:    Symptom or request:     Patient called stating that she has a colonoscopy for 10/09 however she take aspirin 325 mg. She is calling to find out next step. Takes aspirin due to previous history of stroke.    Best Time:  Any    Can we leave a detailed message on this number?  YES     Bonita BEAR  Station

## 2020-10-05 NOTE — TELEPHONE ENCOUNTER
Called patient and notified that she should drop to the 81 mg aspirin until after her colonoscopy. Pt verbalized understanding.  Abimbola RICCI RN BSN PHN  Specialty Clinics

## 2020-10-06 DIAGNOSIS — Z11.59 ENCOUNTER FOR SCREENING FOR OTHER VIRAL DISEASES: ICD-10-CM

## 2020-10-06 PROCEDURE — U0003 INFECTIOUS AGENT DETECTION BY NUCLEIC ACID (DNA OR RNA); SEVERE ACUTE RESPIRATORY SYNDROME CORONAVIRUS 2 (SARS-COV-2) (CORONAVIRUS DISEASE [COVID-19]), AMPLIFIED PROBE TECHNIQUE, MAKING USE OF HIGH THROUGHPUT TECHNOLOGIES AS DESCRIBED BY CMS-2020-01-R: HCPCS | Performed by: SURGERY

## 2020-10-07 LAB
SARS-COV-2 RNA SPEC QL NAA+PROBE: NOT DETECTED
SPECIMEN SOURCE: NORMAL

## 2020-10-08 ENCOUNTER — ANESTHESIA EVENT (OUTPATIENT)
Dept: GASTROENTEROLOGY | Facility: CLINIC | Age: 70
End: 2020-10-08
Payer: COMMERCIAL
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
## 2020-10-09 ENCOUNTER — ANESTHESIA (OUTPATIENT)
Dept: GASTROENTEROLOGY | Facility: CLINIC | Age: 70
End: 2020-10-09
Payer: COMMERCIAL

## 2020-10-09 ENCOUNTER — HOSPITAL ENCOUNTER (OUTPATIENT)
Facility: CLINIC | Age: 70
Discharge: HOME | End: 2020-10-09
Attending: SURGERY | Admitting: SURGERY
Payer: COMMERCIAL

## 2020-10-09 VITALS
WEIGHT: 121 LBS | HEART RATE: 76 BPM | SYSTOLIC BLOOD PRESSURE: 138 MMHG | OXYGEN SATURATION: 98 % | HEIGHT: 66 IN | BODY MASS INDEX: 19.44 KG/M2 | TEMPERATURE: 97.5 F | DIASTOLIC BLOOD PRESSURE: 69 MMHG | RESPIRATION RATE: 16 BRPM

## 2020-10-09 LAB — COLONOSCOPY: NORMAL

## 2020-10-09 PROCEDURE — 250N000009 HC RX 250: Performed by: NURSE ANESTHETIST, CERTIFIED REGISTERED

## 2020-10-09 PROCEDURE — 45380 COLONOSCOPY AND BIOPSY: CPT | Performed by: SURGERY

## 2020-10-09 PROCEDURE — 250N000009 HC RX 250: Performed by: SURGERY

## 2020-10-09 PROCEDURE — 45385 COLONOSCOPY W/LESION REMOVAL: CPT | Mod: PT | Performed by: SURGERY

## 2020-10-09 PROCEDURE — 370N000001 HC ANESTHESIA TECHNICAL FEE, 1ST 30 MIN: Performed by: SURGERY

## 2020-10-09 PROCEDURE — 250N000011 HC RX IP 250 OP 636: Performed by: NURSE ANESTHETIST, CERTIFIED REGISTERED

## 2020-10-09 PROCEDURE — 258N000003 HC RX IP 258 OP 636: Performed by: SURGERY

## 2020-10-09 PROCEDURE — 88305 TISSUE EXAM BY PATHOLOGIST: CPT | Mod: TC | Performed by: SURGERY

## 2020-10-09 PROCEDURE — 88305 TISSUE EXAM BY PATHOLOGIST: CPT | Mod: 26 | Performed by: PATHOLOGY

## 2020-10-09 RX ORDER — LIDOCAINE HYDROCHLORIDE 10 MG/ML
INJECTION, SOLUTION INFILTRATION; PERINEURAL PRN
Status: DISCONTINUED | OUTPATIENT
Start: 2020-10-09 | End: 2020-10-09

## 2020-10-09 RX ORDER — SODIUM CHLORIDE, SODIUM LACTATE, POTASSIUM CHLORIDE, CALCIUM CHLORIDE 600; 310; 30; 20 MG/100ML; MG/100ML; MG/100ML; MG/100ML
INJECTION, SOLUTION INTRAVENOUS CONTINUOUS
Status: DISCONTINUED | OUTPATIENT
Start: 2020-10-09 | End: 2020-10-09 | Stop reason: HOSPADM

## 2020-10-09 RX ORDER — ONDANSETRON 2 MG/ML
4 INJECTION INTRAMUSCULAR; INTRAVENOUS
Status: DISCONTINUED | OUTPATIENT
Start: 2020-10-09 | End: 2020-10-09 | Stop reason: HOSPADM

## 2020-10-09 RX ORDER — PROPOFOL 10 MG/ML
INJECTION, EMULSION INTRAVENOUS CONTINUOUS PRN
Status: DISCONTINUED | OUTPATIENT
Start: 2020-10-09 | End: 2020-10-09

## 2020-10-09 RX ORDER — GLYCOPYRROLATE 0.2 MG/ML
INJECTION, SOLUTION INTRAMUSCULAR; INTRAVENOUS PRN
Status: DISCONTINUED | OUTPATIENT
Start: 2020-10-09 | End: 2020-10-09

## 2020-10-09 RX ORDER — PROPOFOL 10 MG/ML
INJECTION, EMULSION INTRAVENOUS PRN
Status: DISCONTINUED | OUTPATIENT
Start: 2020-10-09 | End: 2020-10-09

## 2020-10-09 RX ORDER — FLUMAZENIL 0.1 MG/ML
0.2 INJECTION, SOLUTION INTRAVENOUS
Status: DISCONTINUED | OUTPATIENT
Start: 2020-10-09 | End: 2020-10-09 | Stop reason: HOSPADM

## 2020-10-09 RX ORDER — LIDOCAINE 40 MG/G
CREAM TOPICAL
Status: DISCONTINUED | OUTPATIENT
Start: 2020-10-09 | End: 2020-10-09 | Stop reason: HOSPADM

## 2020-10-09 RX ORDER — NALOXONE HYDROCHLORIDE 0.4 MG/ML
.1-.4 INJECTION, SOLUTION INTRAMUSCULAR; INTRAVENOUS; SUBCUTANEOUS
Status: DISCONTINUED | OUTPATIENT
Start: 2020-10-09 | End: 2020-10-09 | Stop reason: HOSPADM

## 2020-10-09 RX ADMIN — PROPOFOL 30 MG: 10 INJECTION, EMULSION INTRAVENOUS at 08:03

## 2020-10-09 RX ADMIN — LIDOCAINE HYDROCHLORIDE 1 ML: 10 INJECTION, SOLUTION EPIDURAL; INFILTRATION; INTRACAUDAL; PERINEURAL at 07:25

## 2020-10-09 RX ADMIN — GLYCOPYRROLATE 0.1 MG: 0.2 INJECTION, SOLUTION INTRAMUSCULAR; INTRAVENOUS at 07:59

## 2020-10-09 RX ADMIN — SODIUM CHLORIDE, POTASSIUM CHLORIDE, SODIUM LACTATE AND CALCIUM CHLORIDE: 600; 310; 30; 20 INJECTION, SOLUTION INTRAVENOUS at 07:25

## 2020-10-09 RX ADMIN — PROPOFOL 30 MG: 10 INJECTION, EMULSION INTRAVENOUS at 08:17

## 2020-10-09 RX ADMIN — GLYCOPYRROLATE 0.1 MG: 0.2 INJECTION, SOLUTION INTRAMUSCULAR; INTRAVENOUS at 08:00

## 2020-10-09 RX ADMIN — LIDOCAINE HYDROCHLORIDE 30 MG: 10 INJECTION, SOLUTION INFILTRATION; PERINEURAL at 08:00

## 2020-10-09 RX ADMIN — PROPOFOL 200 MCG/KG/MIN: 10 INJECTION, EMULSION INTRAVENOUS at 08:00

## 2020-10-09 ASSESSMENT — MIFFLIN-ST. JEOR: SCORE: 1085.6

## 2020-10-09 NOTE — ANESTHESIA CARE TRANSFER NOTE
Patient: Nora Alarcon    Procedure(s):  COLONOSCOPY    Diagnosis: Special screening for malignant neoplasms, colon [Z12.11]  Diagnosis Additional Information: No value filed.    Anesthesia Type:   MAC     Note:  Airway :Room Air  Patient transferred to:Phase II        Vitals: (Last set prior to Anesthesia Care Transfer)    CRNA VITALS  10/9/2020 0736 - 10/9/2020 0806      10/9/2020             Pulse:  74    Ht Rate:  73    SpO2:  100 %                Electronically Signed By: JESUS Robles CRNA  October 9, 2020  8:06 AM
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
## 2020-10-09 NOTE — ANESTHESIA POSTPROCEDURE EVALUATION
Patient: Nora Alarcon    Procedure(s):  COLONOSCOPY    Diagnosis:Special screening for malignant neoplasms, colon [Z12.11]  Diagnosis Additional Information: No value filed.    Anesthesia Type:  MAC    Note:  Anesthesia Post Evaluation    Patient location during evaluation: Phase 2  Patient participation: Able to fully participate in evaluation  Level of consciousness: awake  Pain management: adequate  Airway patency: patent  Cardiovascular status: acceptable and hemodynamically stable  Respiratory status: acceptable, room air and spontaneous ventilation  Hydration status: acceptable  PONV: none     Anesthetic complications: None          Last vitals:  Vitals:    10/09/20 0657   BP: (!) 158/90   Pulse: 91   Resp: 22   Temp: 36.4  C (97.5  F)   SpO2: 98%         Electronically Signed By: JESUS Robles CRNA  October 9, 2020  8:10 AM

## 2020-10-12 LAB — COPATH REPORT: NORMAL

## 2020-11-23 ENCOUNTER — TELEPHONE (OUTPATIENT)
Dept: AUDIOLOGY | Facility: CLINIC | Age: 70
End: 2020-11-23

## 2020-11-23 NOTE — TELEPHONE ENCOUNTER
Reason for Call:  Other     Detailed comments: Wants to know how much it would cost to have hearing aid insurance?     Phone Number Patient can be reached at: Cell number on file:    Telephone Information:   Mobile [phone]       Best Time: Any    Can we leave a detailed message on this number? YES    Call taken on 11/23/2020 at 9:46 AM by Denise Behrendt

## 2020-11-24 NOTE — TELEPHONE ENCOUNTER
Left message for patient to call [facility] to purchase additional hearing aid insurance.     Kathe MONROYAAA, #[id number]

## 2020-12-09 ENCOUNTER — HOSPITAL ENCOUNTER (OUTPATIENT)
Dept: MAMMOGRAPHY | Facility: CLINIC | Age: 70
Discharge: HOME | End: 2020-12-09
Attending: INTERNAL MEDICINE | Admitting: INTERNAL MEDICINE
Payer: COMMERCIAL

## 2020-12-09 DIAGNOSIS — Z12.31 VISIT FOR SCREENING MAMMOGRAM: ICD-10-CM

## 2020-12-09 PROCEDURE — 77067 SCR MAMMO BI INCL CAD: CPT

## 2021-02-01 ENCOUNTER — TELEPHONE (OUTPATIENT)
Dept: AUDIOLOGY | Facility: CLINIC | Age: 71
End: 2021-02-01

## 2021-02-01 NOTE — TELEPHONE ENCOUNTER
Patient states she would like a referral from you to an ENT to have her ears cleaned.  Please call to schedule if/when done.    Thank you.

## 2021-02-02 ENCOUNTER — TELEPHONE (OUTPATIENT)
Dept: INTERNAL MEDICINE | Facility: CLINIC | Age: 71
End: 2021-02-02

## 2021-02-02 NOTE — TELEPHONE ENCOUNTER
Reason for call:  Other   Patient called regarding (reason for call): call back  Additional comments: Patient is calling wanting a referral to see an ENT, please call back     Phone number to reach patient:  [phone]    Best Time:  any    Can we leave a detailed message on this number?  YES    Travel screening: Not Applicable

## 2021-02-02 NOTE — TELEPHONE ENCOUNTER
I spoke with pt.    She explains that she has ear wax building up in her right ear. She is hearing extra noise with her hearing aids.  Unsure if it is due to ear wax only or hearing aid problem.    Advised to start with PCP appt to evaluate her ears.      Pt agrees and will schedule.    Pt has pending appt in March for audiology.    Maricruz Thorpe RN

## 2021-02-02 NOTE — TELEPHONE ENCOUNTER
Discussed with patient that this order will need to be done through her primary care provider. She stated she understood and will contact her PCP.    Kathe MURRAY, #[id number]

## 2021-02-26 ENCOUNTER — OFFICE VISIT (OUTPATIENT)
Dept: OTOLARYNGOLOGY | Facility: CLINIC | Age: 71
End: 2021-02-26
Payer: COMMERCIAL

## 2021-02-26 VITALS — HEART RATE: 78 BPM | TEMPERATURE: 98.9 F | SYSTOLIC BLOOD PRESSURE: 154 MMHG | DIASTOLIC BLOOD PRESSURE: 81 MMHG

## 2021-02-26 DIAGNOSIS — H61.23 BILATERAL IMPACTED CERUMEN: Primary | ICD-10-CM

## 2021-02-26 DIAGNOSIS — H90.3 SENSORINEURAL HEARING LOSS, BILATERAL: ICD-10-CM

## 2021-02-26 PROCEDURE — 69210 REMOVE IMPACTED EAR WAX UNI: CPT | Performed by: OTOLARYNGOLOGY

## 2021-02-26 PROCEDURE — 99203 OFFICE O/P NEW LOW 30 MIN: CPT | Mod: 25 | Performed by: OTOLARYNGOLOGY

## 2021-02-26 NOTE — NURSING NOTE
"Initial BP (!) 154/81 (BP Location: Right arm, Patient Position: Sitting, Cuff Size: Adult Regular)   Pulse 78   Temp 98.9  F (37.2  C) (Tympanic)  Estimated body mass index is 19.53 kg/m  as calculated from the following:    Height as of 10/9/20: 1.676 m (5' 6\").    Weight as of 10/9/20: 54.9 kg (121 lb). .    Chrissy Florez MA on 2/26/2021 at 12:50 PM    "
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
## 2021-03-27 DIAGNOSIS — N39.44 NOCTURNAL ENURESIS: ICD-10-CM

## 2021-03-29 NOTE — TELEPHONE ENCOUNTER
"Requested Prescriptions   Pending Prescriptions Disp Refills     tolterodine ER (DETROL LA) 4 MG 24 hr capsule [Pharmacy Med Name: TOLTERODINE ER 4MG CAPSULE] 90 capsule 2     Sig: TAKE 1 CAPSULE BY MOUTH ONCE DAILY       Muscarinic Antagonists (Urinary Incontinence Agents) Passed - 3/27/2021  1:02 AM        Passed - Recent (12 mo) or future (30 days) visit within the authorizing provider's specialty     Patient has had an office visit with the authorizing provider or a provider within the authorizing providers department within the previous 12 mos or has a future within next 30 days. See \"Patient Info\" tab in inbasket, or \"Choose Columns\" in Meds & Orders section of the refill encounter.              Passed - Patient does not have a diagnosis of glaucoma on the problem list     If glaucoma diagnosis is new, refer refill to physician.          Passed - Medication is active on med list        Passed - Patient is 18 years of age or older             "

## 2021-03-30 RX ORDER — TOLTERODINE 4 MG/1
CAPSULE, EXTENDED RELEASE ORAL
Qty: 90 CAPSULE | Refills: 2 | Status: SHIPPED | OUTPATIENT
Start: 2021-03-30 | End: 2021-12-22

## 2021-05-12 ENCOUNTER — TRANSFERRED RECORDS (OUTPATIENT)
Dept: HEALTH INFORMATION MANAGEMENT | Facility: CLINIC | Age: 71
End: 2021-05-12

## 2021-05-13 ENCOUNTER — TELEPHONE (OUTPATIENT)
Dept: OTOLARYNGOLOGY | Facility: CLINIC | Age: 71
End: 2021-05-13

## 2021-05-13 NOTE — TELEPHONE ENCOUNTER
Unknown how patient received surgery scheduler's phone number. Patient has called several times asking for a call back. Routing to proper team.

## 2021-05-13 NOTE — TELEPHONE ENCOUNTER
Patient left another voicemail stating she is calling for her . Patients  is another patient of surg onc. Sent updated message to please disregard patient call and surgery scheduler will follow up.

## 2021-05-20 ENCOUNTER — TELEPHONE (OUTPATIENT)
Dept: ONCOLOGY | Facility: CLINIC | Age: 71
End: 2021-05-20

## 2021-05-20 NOTE — TELEPHONE ENCOUNTER
[facility] Triage Telephone Call    Called by patient to ask about what to do for Randall.  Will open his chart.

## 2021-06-09 ENCOUNTER — OFFICE VISIT (OUTPATIENT)
Dept: DERMATOLOGY | Facility: CLINIC | Age: 71
End: 2021-06-09
Payer: COMMERCIAL

## 2021-06-09 VITALS — OXYGEN SATURATION: 96 % | SYSTOLIC BLOOD PRESSURE: 150 MMHG | HEART RATE: 83 BPM | DIASTOLIC BLOOD PRESSURE: 77 MMHG

## 2021-06-09 DIAGNOSIS — L81.4 LENTIGO: ICD-10-CM

## 2021-06-09 DIAGNOSIS — D18.01 ANGIOMA OF SKIN: ICD-10-CM

## 2021-06-09 DIAGNOSIS — L82.1 SEBORRHEIC KERATOSIS: ICD-10-CM

## 2021-06-09 DIAGNOSIS — D23.9 DERMAL NEVUS: ICD-10-CM

## 2021-06-09 DIAGNOSIS — Z85.828 HISTORY OF SKIN CANCER: Primary | ICD-10-CM

## 2021-06-09 PROCEDURE — 99213 OFFICE O/P EST LOW 20 MIN: CPT | Performed by: DERMATOLOGY
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
## 2021-06-09 NOTE — NURSING NOTE
Chief Complaint   Patient presents with     Skin Check       Vitals:    06/09/21 1300   BP: (!) 150/77   BP Location: Right arm   Patient Position: Sitting   Cuff Size: Adult Regular   Pulse: 83   SpO2: 96%     Wt Readings from Last 1 Encounters:   10/09/20 54.9 kg (121 lb)       Kylie Go LPN .................6/9/2021
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
## 2021-06-25 DIAGNOSIS — I10 ESSENTIAL HYPERTENSION WITH GOAL BLOOD PRESSURE LESS THAN 140/90: ICD-10-CM

## 2021-06-30 ENCOUNTER — OFFICE VISIT (OUTPATIENT)
Dept: AUDIOLOGY | Facility: CLINIC | Age: 71
End: 2021-06-30

## 2021-06-30 DIAGNOSIS — H90.3 SENSORINEURAL HEARING LOSS, BILATERAL: Primary | ICD-10-CM

## 2021-06-30 PROCEDURE — 99207 PR NO CHARGE LOS: CPT | Performed by: AUDIOLOGIST

## 2021-06-30 PROCEDURE — V5299 HEARING SERVICE: HCPCS | Performed by: AUDIOLOGIST

## 2021-06-30 RX ORDER — LOSARTAN POTASSIUM 25 MG/1
TABLET ORAL
Qty: 90 TABLET | Refills: 0 | Status: SHIPPED | OUTPATIENT
Start: 2021-06-30 | End: 2021-09-23

## 2021-06-30 NOTE — PROGRESS NOTES
AUDIOLOGY REPORT: HEARING AID RECHECK    SUBJECTIVE: Nora Alarcon is a 70 year old female, :  1950, was seen in the Audiology Clinic at [facility] on 21 for a return check of their hearing aids. Patient was unaccompanied to today's visit.     Background:   Patient is here today with the complaint of excessive feedback of the right ear hearing aid.     Procedures:       SIDE: Both    : Phonak    TYPE: TokBoxeo B50-R RITE    S/N:      R: [id number]     L: [id number]    WARRANTY:  2020    Today otoscopic inspection finds significant crumen present in the right ear canal. A ear cleaning is recommended. The hearing aids were cleaned and the waxguards and medium closed domes were replaced bilaterally. Biologic listening check found the hearing aids sounding crisp and clear.     Plan:   Patient will return as needed for hearing aid concerns.     CHARGES:   [id number] [facility].     Venu Valenzuela [facility]  Licensed Audiologist #[id number]  2021
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
## 2021-07-21 ENCOUNTER — OFFICE VISIT (OUTPATIENT)
Dept: OTOLARYNGOLOGY | Facility: CLINIC | Age: 71
End: 2021-07-21
Payer: COMMERCIAL

## 2021-07-21 VITALS
HEART RATE: 76 BPM | DIASTOLIC BLOOD PRESSURE: 87 MMHG | WEIGHT: 135 LBS | HEIGHT: 66 IN | BODY MASS INDEX: 21.69 KG/M2 | TEMPERATURE: 99.1 F | SYSTOLIC BLOOD PRESSURE: 138 MMHG

## 2021-07-21 DIAGNOSIS — H61.23 BILATERAL IMPACTED CERUMEN: ICD-10-CM

## 2021-07-21 DIAGNOSIS — H90.3 SENSORINEURAL HEARING LOSS, BILATERAL: Primary | ICD-10-CM

## 2021-07-21 PROCEDURE — 69210 REMOVE IMPACTED EAR WAX UNI: CPT | Performed by: OTOLARYNGOLOGY

## 2021-07-21 PROCEDURE — 99213 OFFICE O/P EST LOW 20 MIN: CPT | Mod: 25 | Performed by: OTOLARYNGOLOGY

## 2021-07-21 ASSESSMENT — MIFFLIN-ST. JEOR: SCORE: 1144.11
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
## 2021-07-21 NOTE — PATIENT INSTRUCTIONS
Nemesio Collins, PATIENT IN [place], EATING BREAKFAST     [facility]  02/07/21 [id number] Per physician instructions      If you have questions or concerns on any instructions given to you by your provider today or if you need to schedule an appointment, you can reach us at [phone].

## 2021-07-21 NOTE — NURSING NOTE
"Initial /87 (BP Location: Right arm, Patient Position: Sitting, Cuff Size: Adult Regular)   Pulse 76   Temp 99.1  F (37.3  C) (Tympanic)   Ht 1.676 m (5' 6\")   Wt 61.2 kg (135 lb)   BMI 21.79 kg/m   Estimated body mass index is 21.79 kg/m  as calculated from the following:    Height as of this encounter: 1.676 m (5' 6\").    Weight as of this encounter: 61.2 kg (135 lb). .    Salome Boyce CMA    "

## 2021-09-23 ENCOUNTER — TELEPHONE (OUTPATIENT)
Dept: FAMILY MEDICINE | Facility: CLINIC | Age: 71
End: 2021-09-23

## 2021-09-23 DIAGNOSIS — I10 ESSENTIAL HYPERTENSION WITH GOAL BLOOD PRESSURE LESS THAN 140/90: ICD-10-CM

## 2021-09-23 DIAGNOSIS — E78.5 HYPERLIPIDEMIA LDL GOAL <130: ICD-10-CM

## 2021-09-23 RX ORDER — SIMVASTATIN 10 MG
TABLET ORAL
Qty: 90 TABLET | Refills: 0 | Status: SHIPPED | OUTPATIENT
Start: 2021-09-23 | End: 2021-12-22

## 2021-09-23 RX ORDER — LOSARTAN POTASSIUM 25 MG/1
TABLET ORAL
Qty: 90 TABLET | Refills: 0 | Status: SHIPPED | OUTPATIENT
Start: 2021-09-23 | End: 2021-12-22

## 2021-09-23 NOTE — TELEPHONE ENCOUNTER
"Requested Prescriptions   Pending Prescriptions Disp Refills     simvastatin (ZOCOR) 10 MG tablet [Pharmacy Med Name: SIMVASTATIN 10MG TABLET] 90 tablet 2     Sig: TAKE 1 TABLET BY MOUTH DAILY AT BEDTIME       Statins Protocol Failed - 9/23/2021  1:13 AM        Failed - LDL on file in past 12 months     Recent Labs   Lab Test 10/03/19  1021   LDL 65             Passed - No abnormal creatine kinase in past 12 months     No lab results found.             Passed - Recent (12 mo) or future (30 days) visit within the authorizing provider's specialty     Patient has had an office visit with the authorizing provider or a provider within the authorizing providers department within the previous 12 mos or has a future within next 30 days. See \"Patient Info\" tab in inbasket, or \"Choose Columns\" in Meds & Orders section of the refill encounter.              Passed - Medication is active on med list        Passed - Patient is age 18 or older        Passed - No active pregnancy on record        Passed - No positive pregnancy test in past 12 months           losartan (COZAAR) 25 MG tablet [Pharmacy Med Name: LOSARTAN 25MG TABLET] 90 tablet 0     Sig: TAKE 1 TABLET BY MOUTH DAILY       Angiotensin-II Receptors Failed - 9/23/2021  1:13 AM        Failed - Normal serum creatinine on file in past 12 months     Recent Labs   Lab Test 07/03/20  1025   CR 0.81       Ok to refill medication if creatinine is low          Failed - Normal serum potassium on file in past 12 months     Recent Labs   Lab Test 07/03/20  1025   POTASSIUM 4.2                    Passed - Last blood pressure under 140/90 in past 12 months     BP Readings from Last 3 Encounters:   07/21/21 138/87   06/09/21 (!) 150/77   02/26/21 (!) 154/81                 Passed - Recent (12 mo) or future (30 days) visit within the authorizing provider's specialty     Patient has had an office visit with the authorizing provider or a provider within the authorizing providers " "department within the previous 12 mos or has a future within next 30 days. See \"Patient Info\" tab in inbasket, or \"Choose Columns\" in Meds & Orders section of the refill encounter.              Passed - Medication is active on med list        Passed - Patient is age 18 or older        Passed - No active pregnancy on record        Passed - No positive pregnancy test in past 12 months             "

## 2021-10-26 ENCOUNTER — TELEPHONE (OUTPATIENT)
Dept: FAMILY MEDICINE | Facility: CLINIC | Age: 71
End: 2021-10-26

## 2021-10-26 ENCOUNTER — LAB (OUTPATIENT)
Dept: LAB | Facility: CLINIC | Age: 71
End: 2021-10-26
Payer: COMMERCIAL

## 2021-10-26 ENCOUNTER — IMMUNIZATION (OUTPATIENT)
Dept: FAMILY MEDICINE | Facility: CLINIC | Age: 71
End: 2021-10-26
Payer: COMMERCIAL

## 2021-10-26 DIAGNOSIS — F31.74 MANIC DISORDER, RECURRENT EPISODE, IN FULL REMISSION (H): Primary | ICD-10-CM

## 2021-10-26 DIAGNOSIS — R60.0 BILATERAL LEG EDEMA: Primary | ICD-10-CM

## 2021-10-26 DIAGNOSIS — I10 ESSENTIAL HYPERTENSION WITH GOAL BLOOD PRESSURE LESS THAN 140/90: ICD-10-CM

## 2021-10-26 DIAGNOSIS — E78.5 HYPERLIPIDEMIA LDL GOAL <130: ICD-10-CM

## 2021-10-26 LAB
ALBUMIN SERPL-MCNC: 3.7 G/DL (ref 3.4–5)
ALP SERPL-CCNC: 52 U/L (ref 40–150)
ALT SERPL W P-5'-P-CCNC: 20 U/L (ref 0–50)
ANION GAP SERPL CALCULATED.3IONS-SCNC: 2 MMOL/L (ref 3–14)
ANION GAP SERPL CALCULATED.3IONS-SCNC: 2 MMOL/L (ref 3–14)
AST SERPL W P-5'-P-CCNC: 17 U/L (ref 0–45)
BASOPHILS # BLD AUTO: 0 10E3/UL (ref 0–0.2)
BASOPHILS NFR BLD AUTO: 1 %
BILIRUB SERPL-MCNC: 0.4 MG/DL (ref 0.2–1.3)
BUN SERPL-MCNC: 31 MG/DL (ref 7–30)
BUN SERPL-MCNC: 31 MG/DL (ref 7–30)
CALCIUM SERPL-MCNC: 9.4 MG/DL (ref 8.5–10.1)
CALCIUM SERPL-MCNC: 9.4 MG/DL (ref 8.5–10.1)
CHLORIDE BLD-SCNC: 108 MMOL/L (ref 94–109)
CHLORIDE BLD-SCNC: 108 MMOL/L (ref 94–109)
CHOLEST SERPL-MCNC: 211 MG/DL
CO2 SERPL-SCNC: 30 MMOL/L (ref 20–32)
CO2 SERPL-SCNC: 30 MMOL/L (ref 20–32)
CREAT SERPL-MCNC: 0.78 MG/DL (ref 0.52–1.04)
CREAT SERPL-MCNC: 0.78 MG/DL (ref 0.52–1.04)
EOSINOPHIL # BLD AUTO: 0.1 10E3/UL (ref 0–0.7)
EOSINOPHIL NFR BLD AUTO: 2 %
ERYTHROCYTE [DISTWIDTH] IN BLOOD BY AUTOMATED COUNT: 13.1 % (ref 10–15)
FASTING STATUS PATIENT QL REPORTED: YES
GFR SERPL CREATININE-BSD FRML MDRD: 77 ML/MIN/1.73M2
GFR SERPL CREATININE-BSD FRML MDRD: 77 ML/MIN/1.73M2
GLUCOSE BLD-MCNC: 88 MG/DL (ref 70–99)
GLUCOSE BLD-MCNC: 88 MG/DL (ref 70–99)
HCT VFR BLD AUTO: 40.8 % (ref 35–47)
HDLC SERPL-MCNC: 105 MG/DL
HGB BLD-MCNC: 13.5 G/DL (ref 11.7–15.7)
LDLC SERPL CALC-MCNC: 87 MG/DL
LYMPHOCYTES # BLD AUTO: 0.5 10E3/UL (ref 0.8–5.3)
LYMPHOCYTES NFR BLD AUTO: 14 %
MCH RBC QN AUTO: 35.2 PG (ref 26.5–33)
MCHC RBC AUTO-ENTMCNC: 33.1 G/DL (ref 31.5–36.5)
MCV RBC AUTO: 106 FL (ref 78–100)
MONOCYTES # BLD AUTO: 0.7 10E3/UL (ref 0–1.3)
MONOCYTES NFR BLD AUTO: 18 %
NEUTROPHILS # BLD AUTO: 2.5 10E3/UL (ref 1.6–8.3)
NEUTROPHILS NFR BLD AUTO: 65 %
NONHDLC SERPL-MCNC: 106 MG/DL
PLATELET # BLD AUTO: 171 10E3/UL (ref 150–450)
POTASSIUM BLD-SCNC: 4.3 MMOL/L (ref 3.4–5.3)
POTASSIUM BLD-SCNC: 4.3 MMOL/L (ref 3.4–5.3)
PROT SERPL-MCNC: 8 G/DL (ref 6.8–8.8)
RBC # BLD AUTO: 3.84 10E6/UL (ref 3.8–5.2)
SODIUM SERPL-SCNC: 140 MMOL/L (ref 133–144)
SODIUM SERPL-SCNC: 140 MMOL/L (ref 133–144)
TRIGL SERPL-MCNC: 95 MG/DL
VALPROATE SERPL-MCNC: 113 MG/L
VALPROATE SERPL-MCNC: NORMAL UG/ML
WBC # BLD AUTO: 3.9 10E3/UL (ref 4–11)

## 2021-10-26 PROCEDURE — 90662 IIV NO PRSV INCREASED AG IM: CPT

## 2021-10-26 PROCEDURE — 80053 COMPREHEN METABOLIC PANEL: CPT

## 2021-10-26 PROCEDURE — 80164 ASSAY DIPROPYLACETIC ACD TOT: CPT

## 2021-10-26 PROCEDURE — 36415 COLL VENOUS BLD VENIPUNCTURE: CPT

## 2021-10-26 PROCEDURE — 85025 COMPLETE CBC W/AUTO DIFF WBC: CPT

## 2021-10-26 PROCEDURE — G0008 ADMIN INFLUENZA VIRUS VAC: HCPCS

## 2021-10-26 PROCEDURE — 80061 LIPID PANEL: CPT

## 2021-10-26 PROCEDURE — 84295 ASSAY OF SERUM SODIUM: CPT

## 2021-10-26 NOTE — TELEPHONE ENCOUNTER
Patient needing compression stockings ordered for her.    2 pair one beige and one black  Found her old order if this helps-    Elastic Bandages & Supports (T.E.D. KNEE LENGTH/L-REGULAR) MISC (Discontinued) 2 each 1 10/26/2015 3/18/2016 --   Sig: Apply LILIBETH Hose during the day and remove at bedtime.   Class: E-Prescribe   Notes to Pharmacy: 15-20 mmhg knee high compression stockings   Order: [id number]   E-Prescribing Status: Receipt confirmed by pharmacy (10/26/2015  3:27 PM CDT     Fax order to [phone]  Ph. [phone] [facility]. Arcelia VARGAS RN

## 2021-10-26 NOTE — RESULT ENCOUNTER NOTE
Kidney function, blood sugar, electrolytes are normal.  Cholesterol slightly higher than last check 2 years ago, but still reasonably well controlled.

## 2021-10-26 NOTE — LETTER
October 26, 2021      Nora Alarcon  [address]   [place] [facility] [address]        Dear ,    We are writing to inform you of your test results.    Kidney function, blood sugar, electrolytes are normal.   Cholesterol slightly higher than last check 2 years ago, but still reasonably well controlled.       Resulted Orders   Basic metabolic panel  (Ca, Cl, CO2, Creat, Gluc, K, Na, BUN)   Result Value Ref Range    Sodium 140 133 - 144 mmol/L    Potassium 4.3 3.4 - 5.3 mmol/L    Chloride 108 94 - 109 mmol/L    Carbon Dioxide (CO2) 30 20 - 32 mmol/L    Anion Gap 2 (L) 3 - 14 mmol/L    Urea Nitrogen 31 (H) 7 - 30 mg/dL    Creatinine 0.78 0.52 - 1.04 mg/dL    Calcium 9.4 8.5 - 10.1 mg/dL    Glucose 88 70 - 99 mg/dL    GFR Estimate 77 >60 mL/min/1.73m2      Comment:      As of July 11, 2021, eGFR is calculated by the CKD-EPI creatinine equation, without race adjustment. eGFR can be influenced by muscle mass, exercise, and diet. The reported eGFR is an estimation only and is only applicable if the renal function is stable.  As of July 11, 2021, eGFR is calculated by the CKD-EPI creatinine equation, without race adjustment. eGFR can be influenced by muscle mass, exercise, and diet. The reported eGFR is an estimation only and is only applicable if the renal function is stable.   Lipid panel reflex to direct LDL Fasting   Result Value Ref Range    Cholesterol 211 (H) <200 mg/dL    Triglycerides 95 <150 mg/dL    Direct Measure  >=50 mg/dL    LDL Cholesterol Calculated 87 <=100 mg/dL    Non HDL Cholesterol 106 <130 mg/dL    Patient Fasting > 8hrs? Yes     Narrative    Cholesterol  Desirable:  <200 mg/dL    Triglycerides  Normal:  Less than 150 mg/dL  Borderline High:  150-199 mg/dL  High:  200-499 mg/dL  Very High:  Greater than or equal to 500 mg/dL    Direct Measure HDL  Female:  Greater than or equal to 50 mg/dL   Male:  Greater than or equal to 40 mg/dL    LDL Cholesterol  Desirable:  <100mg/dL  Above  Desirable:  100-129 mg/dL   Borderline High:  130-159 mg/dL   High:  160-189 mg/dL   Very High:  >= 190 mg/dL    Non HDL Cholesterol  Desirable:  130 mg/dL  Above Desirable:  130-159 mg/dL  Borderline High:  160-189 mg/dL  High:  190-219 mg/dL  Very High:  Greater than or equal to 220 mg/dL       If you have any questions or concerns, please call the clinic at the number listed above.       Sincerely,      Brandie Hill, DO

## 2022-01-25 ENCOUNTER — OFFICE VISIT (OUTPATIENT)
Dept: FAMILY MEDICINE | Facility: CLINIC | Age: 72
End: 2022-01-25
Payer: COMMERCIAL

## 2022-01-25 ENCOUNTER — ANCILLARY PROCEDURE (OUTPATIENT)
Dept: GENERAL RADIOLOGY | Facility: CLINIC | Age: 72
End: 2022-01-25
Attending: INTERNAL MEDICINE
Payer: COMMERCIAL

## 2022-01-25 VITALS
HEART RATE: 92 BPM | TEMPERATURE: 99.8 F | HEIGHT: 66 IN | RESPIRATION RATE: 20 BRPM | OXYGEN SATURATION: 96 % | BODY MASS INDEX: 20.49 KG/M2 | WEIGHT: 127.5 LBS | DIASTOLIC BLOOD PRESSURE: 72 MMHG | SYSTOLIC BLOOD PRESSURE: 138 MMHG

## 2022-01-25 DIAGNOSIS — I10 ESSENTIAL HYPERTENSION WITH GOAL BLOOD PRESSURE LESS THAN 140/90: ICD-10-CM

## 2022-01-25 DIAGNOSIS — M25.552 HIP PAIN, LEFT: ICD-10-CM

## 2022-01-25 DIAGNOSIS — N39.44 NOCTURNAL ENURESIS: ICD-10-CM

## 2022-01-25 DIAGNOSIS — F31.0 BIPOLAR AFFECTIVE DISORDER, CURRENT EPISODE HYPOMANIC (H): ICD-10-CM

## 2022-01-25 DIAGNOSIS — M25.552 HIP PAIN, LEFT: Primary | ICD-10-CM

## 2022-01-25 DIAGNOSIS — E78.5 HYPERLIPIDEMIA LDL GOAL <130: ICD-10-CM

## 2022-01-25 PROCEDURE — 72170 X-RAY EXAM OF PELVIS: CPT | Performed by: RADIOLOGY

## 2022-01-25 PROCEDURE — 99214 OFFICE O/P EST MOD 30 MIN: CPT | Performed by: INTERNAL MEDICINE

## 2022-01-25 RX ORDER — TOLTERODINE 4 MG/1
CAPSULE, EXTENDED RELEASE ORAL
Qty: 90 CAPSULE | Refills: 3 | Status: SHIPPED | OUTPATIENT
Start: 2022-01-25 | End: 2022-01-27

## 2022-01-25 RX ORDER — SIMVASTATIN 10 MG
10 TABLET ORAL AT BEDTIME
Qty: 90 TABLET | Refills: 3 | Status: SHIPPED | OUTPATIENT
Start: 2022-01-25 | End: 2022-01-27

## 2022-01-25 RX ORDER — LOSARTAN POTASSIUM 25 MG/1
25 TABLET ORAL DAILY
Qty: 90 TABLET | Refills: 3 | Status: SHIPPED | OUTPATIENT
Start: 2022-01-25 | End: 2022-01-27

## 2022-01-25 ASSESSMENT — PAIN SCALES - GENERAL: PAINLEVEL: SEVERE PAIN (7)

## 2022-01-25 ASSESSMENT — MIFFLIN-ST. JEOR: SCORE: 1105.09

## 2022-01-25 NOTE — PATIENT INSTRUCTIONS
Health Care Maintenance:  1. Check insurance coverage of shingles vaccine  2. You are up to date on pneumonia vaccine    Hip pain:  1. Updated xray today  2. Start Ibuprofen 600 mg 3 x day with food x 1 week  3. If symptoms not improving, follow-up with Orthopedics/Sports Medicine  4. Can use heat or ice  5. Try topicals such as Voltaren, Aspercream with Lidocaine, Capsaicin, Icy Hot, Biofreeze, Salon Pas

## 2022-01-25 NOTE — RESULT ENCOUNTER NOTE
The arthritis in the hips has slightly worsened. Continue with plan of care discussed during office visit

## 2022-01-25 NOTE — LETTER
January 26, 2022      Nora Alarcon  [address]   [place] [address]        Dear Ms.Dorian,    We are writing to inform you of your test results.    The arthritis in the hips has slightly worsened. Continue with plan of care discussed during office visit       Resulted Orders   XR Pelvis 1/2 Views    Narrative    PELVIS ONE TO TWO VIEWS January 25, 2022 2:37 PM     HISTORY: Lateral left hip pain. Hip pain, left.    COMPARISON: 10/30/2019 x-ray.      Impression    IMPRESSION: Vertebroplasty at L5. Mild to moderate bilateral hip  degenerative changes. No evidence of acute fracture or AVN.  Degenerative changes in the SI joints. No significant change.    DAISY GALDAMEZ MD         SYSTEM ID:  [id number]       If you have any questions or concerns, please call the clinic at the number listed above.       Sincerely,      Brandie Hill, DO

## 2022-01-25 NOTE — PROGRESS NOTES
Assessment & Plan     Hip pain, left - Bursitis versus osteoarthritis versus other.  X-ray to check on advancing arthritis.  NSAIDs x1 week.  Other home care discussed.  Follow-up with Ortho if symptoms not improving  - XR Pelvis 1/2 Views; Future  - Orthopedic  Referral; Future    Bipolar affective disorder, current episode hypomanic (H) Known issue that I take into account for their medical decisions, no current exacerbations or new concerns    Hyperlipidemia LDL goal <130  - stable, refill provided  - simvastatin (ZOCOR) 10 MG tablet; Take 1 tablet (10 mg) by mouth At Bedtime Patient will call to fill    Nocturnal enuresis  - stable, refill provided  - tolterodine ER (DETROL LA) 4 MG 24 hr capsule; TAKE 1 CAPSULE BY MOUTH ONCE DAILY    Essential hypertension with goal blood pressure less than 140/90  - stable, refill provided  - losartan (COZAAR) 25 MG tablet; Take 1 tablet (25 mg) by mouth daily             Patient Instructions   Health Care Maintenance:  1. Check insurance coverage of shingles vaccine  2. You are up to date on pneumonia vaccine    Hip pain:  1. Updated xray today  2. Start Ibuprofen 600 mg 3 x day with food x 1 week  3. If symptoms not improving, follow-up with Orthopedics/Sports Medicine  4. Can use heat or ice  5. Try topicals such as Voltaren, Aspercream with Lidocaine, Capsaicin, Icy Hot, Biofreeze, Salon Pas         Return in about 2 weeks (around 2/8/2022) for If symptoms don't improve or if they worsen.    Brandie Hill DO  [facility]    Nasreen Melendez is a 71 year old who presents for the following health issues     HPI     Joint Pain    Onset: about 2 weeks    Description:   Location: left hip  Character: Dull ache    Intensity: severe, 7/10 currently, at it's worst about 8/10    Progression of Symptoms: same    Accompanying Signs & Symptoms:  Other symptoms: none    History:   Previous similar pain: no       Precipitating factors:   Trauma  or overuse: no     Alleviating factors:Improved by: advil helps    Therapies Tried and outcome: advil helps, tried salonpas patch but hasn't gotten relief    Lateral left hip pain, worse at night; pain is constant, but wax/wane    No pain with laying on the hip    Xray pelvis 2019  IMPRESSION: No fracture or acute abnormality is seen. Again  demonstrated are mild degenerative changes in both hips. No other  abnormality is noted. I see no definite change since the previous  Examination.    Xray lumbar spine 2019  IMPRESSION:  1. Bones appear osteopenic which limits evaluation for fractures.  2. Compression deformities of the T12 and L1 vertebral bodies are  again seen and appear grossly similar to prior CT. No obvious new loss  of vertebral body height.  3. Ill-defined sclerosis at the anterior inferior margin of the L5  vertebral body. This was not present on prior. Recommend correlation  for previous kyphoplasty at that level. If no previous kyphoplasty,  sclerotic metastasis could be considered.  4. Convex left curvature of the lumbar spine centered at L3. Mild  grade 1 anterolisthesis of L4 on L5.  5. Moderate degenerative endplate changes and loss of intervertebral  disc space throughout the lumbar spine. Marked facet hypertrophy in  the lower lumbar spine.  6. Right pleural effusion partially visualized.    Hypertension:   On losartan in 2016, stopped due to acute kidney injury  --needs refills      HEALTH CARE MAINTENANCE:  --wonders about shingles and pneumonia vaccine     Current Outpatient Medications   Medication Sig Dispense Refill     Acetaminophen (TYLENOL PO) Take 1,000 mg by mouth 2 times daily as needed for mild pain or fever        ASPIRIN PO Take 325 mg by mouth daily        Divalproex Sodium (DEPAKOTE PO) Take 250 mg by mouth 2 times daily        FORTEO 600 MCG/2.4ML SOPN injection        loperamide (IMODIUM A-D) 2 MG capsule Take 1 capsule (2 mg) by mouth 2 times daily as needed for diarrhea 15  "capsule 0     losartan (COZAAR) 25 MG tablet TAKE 1 TABLET BY MOUTH DAILY 90 tablet 0     mirtazapine (REMERON) 15 MG tablet        MIRTAZAPINE PO Take 45 mg by mouth At Bedtime       neomycin-polymixin-dexamethasone (MAXITROL) 0.1 % ophthalmic suspension neomycin-polymyxin-dexameth 3.5 mg/mL-10,000 unit/mL-0.1% eye drops       order for DME Equipment being ordered: bilateral below the knee LILIBETH stockings 2 each 2     simvastatin (ZOCOR) 10 MG tablet TAKE 1 TABLET BY MOUTH DAILY AT BEDTIME 90 tablet 0     tolterodine ER (DETROL LA) 4 MG 24 hr capsule TAKE 1 CAPSULE BY MOUTH ONCE DAILY 90 capsule 0     divalproex sodium delayed-release (DEPAKOTE) 500 MG DR tablet  (Patient not taking: Reported on 1/25/2022)           Review of Systems   Constitutional, HEENT, cardiovascular, pulmonary, gi and gu systems are negative, except as otherwise noted.      Objective    /72 (BP Location: Right arm, Patient Position: Sitting, Cuff Size: Adult Regular)   Pulse 92   Temp 99.8  F (37.7  C) (Tympanic)   Resp 20   Ht 1.676 m (5' 6\")   Wt 57.8 kg (127 lb 8 oz)   SpO2 96%   Breastfeeding No   BMI 20.58 kg/m    Body mass index is 20.58 kg/m .  Physical Exam   GENERAL APPEARANCE: alert, no distress and frail, elderly  ORTHO: Hip Exam: Palpation: Tender:   right greater trochanter  Non-tender:  left greater trochanter, left gluteus medius, right gluteus medius, left ASIS, right ASIS, left iliac crest, right iliac crest  Range of Motion:  Full ROM, both hips  Strength:  full strength  Special tests:  negative Lolis's, no iliopsoas tightness                  "

## 2022-01-26 DIAGNOSIS — N39.44 NOCTURNAL ENURESIS: ICD-10-CM

## 2022-01-26 DIAGNOSIS — I10 ESSENTIAL HYPERTENSION WITH GOAL BLOOD PRESSURE LESS THAN 140/90: ICD-10-CM

## 2022-01-26 DIAGNOSIS — E78.5 HYPERLIPIDEMIA LDL GOAL <130: ICD-10-CM

## 2022-01-27 RX ORDER — LOSARTAN POTASSIUM 25 MG/1
25 TABLET ORAL DAILY
Qty: 90 TABLET | Refills: 3 | Status: SHIPPED | OUTPATIENT
Start: 2022-01-27 | End: 2023-01-26

## 2022-01-27 RX ORDER — SIMVASTATIN 10 MG
10 TABLET ORAL AT BEDTIME
Qty: 90 TABLET | Refills: 3 | Status: SHIPPED | OUTPATIENT
Start: 2022-01-27 | End: 2022-12-20

## 2022-01-27 RX ORDER — TOLTERODINE 4 MG/1
CAPSULE, EXTENDED RELEASE ORAL
Qty: 90 CAPSULE | Refills: 3 | Status: SHIPPED | OUTPATIENT
Start: 2022-01-27 | End: 2023-02-17

## 2022-02-09 ENCOUNTER — OFFICE VISIT (OUTPATIENT)
Dept: ORTHOPEDICS | Facility: CLINIC | Age: 72
End: 2022-02-09
Attending: INTERNAL MEDICINE
Payer: COMMERCIAL

## 2022-02-09 ENCOUNTER — ANCILLARY PROCEDURE (OUTPATIENT)
Dept: GENERAL RADIOLOGY | Facility: CLINIC | Age: 72
End: 2022-02-09
Attending: PEDIATRICS
Payer: COMMERCIAL

## 2022-02-09 VITALS
WEIGHT: 127 LBS | BODY MASS INDEX: 20.41 KG/M2 | DIASTOLIC BLOOD PRESSURE: 80 MMHG | HEIGHT: 66 IN | SYSTOLIC BLOOD PRESSURE: 143 MMHG

## 2022-02-09 DIAGNOSIS — S76.012A STRAIN OF FLEXOR MUSCLE OF LEFT HIP, INITIAL ENCOUNTER: Primary | ICD-10-CM

## 2022-02-09 DIAGNOSIS — M25.552 HIP PAIN, LEFT: ICD-10-CM

## 2022-02-09 PROCEDURE — 99203 OFFICE O/P NEW LOW 30 MIN: CPT | Performed by: PEDIATRICS

## 2022-02-09 PROCEDURE — 73502 X-RAY EXAM HIP UNI 2-3 VIEWS: CPT | Mod: LT | Performed by: RADIOLOGY

## 2022-02-09 ASSESSMENT — MIFFLIN-ST. JEOR: SCORE: 1102.82

## 2022-02-09 NOTE — PROGRESS NOTES
ASSESSMENT & PLAN    Nora was seen today for pain.    Diagnoses and all orders for this visit:    Strain of flexor muscle of left hip, initial encounter  -     Physical Therapy Referral; Future    Hip pain, left  -     Orthopedic  Referral  -     XR Pelvis w Hip LT 1 View  -     Physical Therapy Referral; Future      This issue is acute and Unchanged.    We discussed these other possible diagnosis: Does have mild arthritis, pain is more near ASIS and hip flexor    Plan:  - Today's Plan of Care:  Rehab: Physical Therapy: [facility] - [phone]  Continue with relative rest and activity modification, Ice, Compression, and Elevation.  Can apply ice 10-15 minutes 3-4 times per day as needed. OTC medications as needed.    -We also discussed other future treatment options:  MRI if symptoms worsen    Follow Up: 6 - 8 weeks    Concerning signs and symptoms were reviewed.  The patient expressed understanding of this management plan and all questions were answered at this time.    Thanks for the opportunity to participate in the care of this patient, I will keep you updated on their progress.    CC: Brandie Polanco MD [facility]  Sports Medicine Physician  [facility]      -----  Chief Complaint   Patient presents with     Left Hip - Pain       SUBJECTIVE  Nora Alarcon is a/an 71 year old female who is seen in consultation at the request of  Brandie Hill D.O. for evaluation of left hip pain.     The patient is seen by themselves.    Onset: 2 week(s) ago. Reports insidious onset without acute precipitating event.  Location of Pain: left hip, anterior and lateral  Worsened by: sleeping on the hip, stairs, non specific movements   Better with: ibuprofen, sitting/resting   Treatments tried: rest/activity avoidance, ibuprofen, other medications: topical salonpas and previous imaging (xray 1/25/22)  Associated symptoms: pain of lateral hip,  "ASIS    Orthopedic/Surgical history: YES - Date: chronic right hip pain, arthritis  Social History/Occupation: retired;     No family history pertinent to patient's problem today.    REVIEW OF SYSTEMS:  Review of Systems  Skin: no bruising, no swelling  Musculoskeletal: as above  Neurologic: no numbness, paresthesias  Remainder of review of systems is negative including constitutional, CV, pulmonary, GI, except as noted in HPI or medical history.    OBJECTIVE:  BP (!) 143/80   Ht 1.676 m (5' 6\")   Wt 57.6 kg (127 lb)   BMI 20.50 kg/m     General: healthy, alert and in no distress  HEENT: no scleral icterus or conjunctival erythema  Skin: no suspicious lesions or rash. No jaundice.  CV: distal perfusion intact  Resp: normal respiratory effort without conversational dyspnea   Psych: normal mood and affect  Gait: using walking stickes  Neuro: Normal light sensory exam of lower extremity    Bilateral hip exam    Inspection:      no edema or ecchymosis in hip area    Tender:      ASIS left and into groin    Non Tender:      remainder of the hip area bilateral    ROM:     Full active and passive ROM  bilateral    Strength:      flexion 5-/5 left       abduction 5/5 bilateral       adduction 5/5 bilateral    Sensation:      grossly intact in hip and thigh    Special Tests:      neg (-) MARK left       neg (-) FADIR left  - neg log roll    RADIOLOGY:  I independently ordered, visualized and reviewed these images with the patient  AP Pelvis and lateral left XR views of hip reviewed: no acute bony abnormality, mild degenerative change  - will follow official read      Review of prior external note(s) from - PCP  Review of the result(s) of each unique test - XR         "

## 2022-02-09 NOTE — LETTER
2/9/2022         RE: Nora Alarcon  [address]   [place] [address]        Dear Colleague,    Thank you for referring your patient, Nora Alarcon, to the [facility] [place]. Please see a copy of my visit note below.    ASSESSMENT & PLAN    Nora was seen today for pain.    Diagnoses and all orders for this visit:    Strain of flexor muscle of left hip, initial encounter  -     Physical Therapy Referral; Future    Hip pain, left  -     Orthopedic  Referral  -     XR Pelvis w Hip LT 1 View  -     Physical Therapy Referral; Future      This issue is acute and Unchanged.    We discussed these other possible diagnosis: Does have mild arthritis, pain is more near ASIS and hip flexor    Plan:  - Today's Plan of Care:  Rehab: Physical Therapy: [facility] - [phone]  Continue with relative rest and activity modification, Ice, Compression, and Elevation.  Can apply ice 10-15 minutes 3-4 times per day as needed. OTC medications as needed.    -We also discussed other future treatment options:  MRI if symptoms worsen    Follow Up: 6 - 8 weeks    Concerning signs and symptoms were reviewed.  The patient expressed understanding of this management plan and all questions were answered at this time.    Thanks for the opportunity to participate in the care of this patient, I will keep you updated on their progress.    CC: Brandie Polanco MD [facility]  Sports Medicine Physician  [facility]      -----  Chief Complaint   Patient presents with     Left Hip - Pain       SUBJECTIVE  Nora Alarcon is a/an 71 year old female who is seen in consultation at the request of  Brandie Hill D.O. for evaluation of left hip pain.     The patient is seen by themselves.    Onset: 2 week(s) ago. Reports insidious onset without acute precipitating event.  Location of Pain: left hip, anterior and lateral  Worsened by: sleeping on  "the hip, stairs, non specific movements   Better with: ibuprofen, sitting/resting   Treatments tried: rest/activity avoidance, ibuprofen, other medications: topical salonpas and previous imaging (xray 1/25/22)  Associated symptoms: pain of lateral hip, ASIS    Orthopedic/Surgical history: YES - Date: chronic right hip pain, arthritis  Social History/Occupation: retired;     No family history pertinent to patient's problem today.    REVIEW OF SYSTEMS:  Review of Systems  Skin: no bruising, no swelling  Musculoskeletal: as above  Neurologic: no numbness, paresthesias  Remainder of review of systems is negative including constitutional, CV, pulmonary, GI, except as noted in HPI or medical history.    OBJECTIVE:  BP (!) 143/80   Ht 1.676 m (5' 6\")   Wt 57.6 kg (127 lb)   BMI 20.50 kg/m     General: healthy, alert and in no distress  HEENT: no scleral icterus or conjunctival erythema  Skin: no suspicious lesions or rash. No jaundice.  CV: distal perfusion intact  Resp: normal respiratory effort without conversational dyspnea   Psych: normal mood and affect  Gait: using walking stickes  Neuro: Normal light sensory exam of lower extremity    Bilateral hip exam    Inspection:      no edema or ecchymosis in hip area    Tender:      ASIS left and into groin    Non Tender:      remainder of the hip area bilateral    ROM:     Full active and passive ROM  bilateral    Strength:      flexion 5-/5 left       abduction 5/5 bilateral       adduction 5/5 bilateral    Sensation:      grossly intact in hip and thigh    Special Tests:      neg (-) MARK left       neg (-) FADIR left  - neg log roll    RADIOLOGY:  I independently ordered, visualized and reviewed these images with the patient  AP Pelvis and lateral left XR views of hip reviewed: no acute bony abnormality, mild degenerative change  - will follow official read      Review of prior external note(s) from - PCP  Review of the result(s) of each unique test - XR       "       Again, thank you for allowing me to participate in the care of your patient.        Sincerely,        Evelyne Polanco MD

## 2022-02-09 NOTE — PATIENT INSTRUCTIONS
We discussed these other possible diagnosis: Does have mild arthritis, pain is more near ASIS and hip flexor    Plan:  - Today's Plan of Care:  [phone]  Continue with relative rest and activity modification, Ice, Compression, and Elevation.  Can apply ice 10-15 minutes 3-4 times per day as needed. OTC medications as needed.    -We also discussed other future treatment options:  MRI if symptoms worsen    Follow Up: 6 - 8 weeks    If you have any further questions for your physician or physician s care team you can call [phone] and use option 3 to leave a voice message. Calls received during business hours will be returned same day.

## 2022-02-14 ENCOUNTER — ALLIED HEALTH/NURSE VISIT (OUTPATIENT)
Dept: FAMILY MEDICINE | Facility: CLINIC | Age: 72
End: 2022-02-14
Payer: COMMERCIAL

## 2022-02-14 ENCOUNTER — HOSPITAL ENCOUNTER (OUTPATIENT)
Dept: MAMMOGRAPHY | Facility: CLINIC | Age: 72
Discharge: HOME | End: 2022-02-14
Attending: INTERNAL MEDICINE | Admitting: INTERNAL MEDICINE
Payer: COMMERCIAL

## 2022-02-14 DIAGNOSIS — Z12.31 VISIT FOR SCREENING MAMMOGRAM: ICD-10-CM

## 2022-02-14 DIAGNOSIS — Z23 ENCOUNTER FOR IMMUNIZATION: Primary | ICD-10-CM

## 2022-02-14 PROCEDURE — 90471 IMMUNIZATION ADMIN: CPT

## 2022-02-14 PROCEDURE — 99207 PR NO CHARGE NURSE ONLY: CPT

## 2022-02-14 PROCEDURE — 90750 HZV VACC RECOMBINANT IM: CPT

## 2022-02-14 PROCEDURE — 77067 SCR MAMMO BI INCL CAD: CPT

## 2022-02-14 NOTE — PROGRESS NOTES
Prior to immunization administration, verified patients identity using patient s name and date of birth. Please see Immunization Activity for additional information.     Screening Questionnaire for Adult Immunization    Are you sick today?   No   Do you have allergies to medications, food, a vaccine component or latex?   No   Have you ever had a serious reaction after receiving a vaccination?   No   Do you have a long-term health problem with heart, lung, kidney, or metabolic disease (e.g., diabetes), asthma, a blood disorder, no spleen, complement component deficiency, a cochlear implant, or a spinal fluid leak?  Are you on long-term aspirin therapy?   Yes   Do you have cancer, leukemia, HIV/AIDS, or any other immune system problem?   No   Do you have a parent, brother, or sister with an immune system problem?   No   In the past 3 months, have you taken medications that affect  your immune system, such as prednisone, other steroids, or anticancer drugs; drugs for the treatment of rheumatoid arthritis, Crohn s disease, or psoriasis; or have you had radiation treatments?   No   Have you had a seizure, or a brain or other nervous system problem?   No   During the past year, have you received a transfusion of blood or blood    products, or been given immune (gamma) globulin or antiviral drug?   No   For women: Are you pregnant or is there a chance you could become       pregnant during the next month?   No   Have you received any vaccinations in the past 4 weeks?   No     Immunization questionnaire was positive for at least one answer. Long term asa use .

## 2022-03-04 ENCOUNTER — HOSPITAL ENCOUNTER (OUTPATIENT)
Dept: BONE DENSITY | Facility: CLINIC | Age: 72
Discharge: HOME | End: 2022-03-04
Attending: FAMILY MEDICINE | Admitting: FAMILY MEDICINE
Payer: COMMERCIAL

## 2022-03-04 DIAGNOSIS — M81.0 POSTMENOPAUSAL OSTEOPOROSIS: ICD-10-CM

## 2022-03-04 PROCEDURE — 77080 DXA BONE DENSITY AXIAL: CPT

## 2022-03-09 ENCOUNTER — TELEPHONE (OUTPATIENT)
Dept: FAMILY MEDICINE | Facility: CLINIC | Age: 72
End: 2022-03-09
Payer: MEDICARE

## 2022-03-13 ENCOUNTER — TELEPHONE (OUTPATIENT)
Dept: FAMILY MEDICINE | Facility: CLINIC | Age: 72
End: 2022-03-13
Payer: MEDICARE

## 2022-03-13 NOTE — TELEPHONE ENCOUNTER
[employer] shingrix claim form completed and routed to Dr. Hill for review and signature.

## 2022-03-15 ENCOUNTER — MEDICAL CORRESPONDENCE (OUTPATIENT)
Dept: HEALTH INFORMATION MANAGEMENT | Facility: CLINIC | Age: 72
End: 2022-03-15
Payer: MEDICARE

## 2022-03-16 ENCOUNTER — TRANSFERRED RECORDS (OUTPATIENT)
Dept: HEALTH INFORMATION MANAGEMENT | Facility: CLINIC | Age: 72
End: 2022-03-16
Payer: MEDICARE

## 2022-03-16 NOTE — TELEPHONE ENCOUNTER
Re-faxed they said they did not get it. But it did went through.  Marilin Givens PSC on 3/16/2022 at 2:45 PM

## 2022-03-16 NOTE — TELEPHONE ENCOUNTER
Form completed, signed, and faxed to [phone]. Copy of form sent to scan and copy placed in basket.

## 2022-03-25 ENCOUNTER — LAB (OUTPATIENT)
Dept: LAB | Facility: CLINIC | Age: 72
End: 2022-03-25
Payer: COMMERCIAL

## 2022-03-25 DIAGNOSIS — M81.0 POSTMENOPAUSAL OSTEOPOROSIS: Primary | ICD-10-CM

## 2022-03-25 DIAGNOSIS — M81.0 POSTMENOPAUSAL OSTEOPOROSIS: ICD-10-CM

## 2022-03-25 LAB
ALP SERPL-CCNC: 105 U/L (ref 40–150)
CALCIUM SERPL-MCNC: 9.3 MG/DL (ref 8.5–10.1)
DEPRECATED CALCIDIOL+CALCIFEROL SERPL-MC: 83 UG/L (ref 20–75)
PTH-INTACT SERPL-MCNC: 25 PG/ML (ref 18–80)

## 2022-03-25 PROCEDURE — 82306 VITAMIN D 25 HYDROXY: CPT

## 2022-03-25 PROCEDURE — 36415 COLL VENOUS BLD VENIPUNCTURE: CPT

## 2022-03-25 PROCEDURE — 83970 ASSAY OF PARATHORMONE: CPT

## 2022-03-25 PROCEDURE — 84075 ASSAY ALKALINE PHOSPHATASE: CPT

## 2022-03-25 PROCEDURE — 82310 ASSAY OF CALCIUM: CPT

## 2022-06-14 ENCOUNTER — TELEPHONE (OUTPATIENT)
Dept: FAMILY MEDICINE | Facility: CLINIC | Age: 72
End: 2022-06-14
Payer: MEDICARE

## 2022-06-14 NOTE — TELEPHONE ENCOUNTER
Patient Quality Outreach    Patient is due for the following:   Hypertension -  BP check    NEXT STEPS:   Schedule a nurse only visit for BP     Type of outreach:    Phone, spoke to patient/parent. she scheduled a RN BP check . She will call another time to schedule Wellness      Questions for provider review:    None     Duglas Garcia, [facility]

## 2022-06-14 NOTE — TELEPHONE ENCOUNTER
Panel Management:    Patient's blood pressure was elevated at a recent clinic visit.  It is important to get blood pressure < 140/90    Please contact the patient and have them schedule RN visit for free blood pressure check; however, she is due for Medicare annual wellness visit and this would be a preferred way to monitor her blood pressure than a nurse pressure check, so reach out to her to schedule this

## 2022-06-21 ENCOUNTER — TELEPHONE (OUTPATIENT)
Dept: FAMILY MEDICINE | Facility: CLINIC | Age: 72
End: 2022-06-21
Payer: MEDICARE

## 2022-06-21 ENCOUNTER — ALLIED HEALTH/NURSE VISIT (OUTPATIENT)
Dept: FAMILY MEDICINE | Facility: CLINIC | Age: 72
End: 2022-06-21
Payer: COMMERCIAL

## 2022-06-21 ENCOUNTER — HOSPITAL ENCOUNTER (OUTPATIENT)
Dept: PHYSICAL THERAPY | Facility: CLINIC | Age: 72
Setting detail: THERAPIES SERIES
Discharge: HOME | End: 2022-06-21
Attending: PEDIATRICS
Payer: COMMERCIAL

## 2022-06-21 VITALS — DIASTOLIC BLOOD PRESSURE: 94 MMHG | OXYGEN SATURATION: 98 % | SYSTOLIC BLOOD PRESSURE: 174 MMHG | HEART RATE: 81 BPM

## 2022-06-21 DIAGNOSIS — I10 ESSENTIAL HYPERTENSION WITH GOAL BLOOD PRESSURE LESS THAN 140/90: Primary | ICD-10-CM

## 2022-06-21 DIAGNOSIS — I10 ESSENTIAL HYPERTENSION WITH GOAL BLOOD PRESSURE LESS THAN 140/90: ICD-10-CM

## 2022-06-21 PROCEDURE — 97161 PT EVAL LOW COMPLEX 20 MIN: CPT | Mod: GP | Performed by: PHYSICAL THERAPIST

## 2022-06-21 PROCEDURE — 97110 THERAPEUTIC EXERCISES: CPT | Mod: GP | Performed by: PHYSICAL THERAPIST

## 2022-06-21 PROCEDURE — 99207 PR NO CHARGE NURSE ONLY: CPT

## 2022-06-21 NOTE — PROGRESS NOTES
Nora Alarcon is a 71 year old year old patient who comes in today for a Blood Pressure check because of ongoing blood pressure monitoring.    Vital Signs as repeated by /92 p 81, 174/94 p 81    Patient is taking medication as prescribed    Patient is tolerating medications well.    Patient is monitoring Blood Pressure at home.  Average readings if yes are 130/70    Current complaints: none-pt appeared anxious. Asked pt if any stressors and pt began to cry and states younger sister has cancer and is not doing well. Allowed pt 10 min between bp checks to calm.    Disposition:  patient instructed to await provider response      Hilario Magaña RN

## 2022-06-21 NOTE — TELEPHONE ENCOUNTER
Because blood pressures at home are well controlled and was only high here in the clinic, no changes to medications for now.  Recommend to recheck blood pressure again in about 2 to 4 weeks and bring blood pressure log so I can review the results.  If blood pressure continues to be quite elevated, would increase medications.  Even if the blood pressure is high due to stress-  It is still high which increases the risk of heart attack or stroke

## 2022-06-21 NOTE — PROGRESS NOTES
06/21/22 1000   General Information   Type of Visit Initial OP Ortho PT Evaluation   Start of Care Date 06/21/22   Referring Physician Dr. Polanco   Patient/Family Goals Statement reduce hip pain; strengthen   Orders Evaluate and Treat   Date of Order 02/09/22   Certification Required? Yes   Medical Diagnosis Strain of flexor muscle of left hip, initial encounter (S76.012A)  - Primary     Hip pain, left (M25.552)   Body Part(s)   Body Part(s) Hip   Presentation and Etiology   Pertinent history of current problem (include personal factors and/or comorbidities that impact the POC) pt notes some L hip on the lateral portion. She has osteoporosis with a decrease in density to jake bone of the hip/pelvis. She is currently being treated for osteoporosis. No noted fractured on the images. She notes pain when sitting, limited pain when ambulating with the walking sticks. Uses walking sticks when outside, for about 2 years. A previous fall about 6 months ago with landing on her coccyx. Pt notes surgery afterwards with possible mark placement. Hx of stroke with weakness on the R side.   Impairments A. Pain;G. Impaired balance;H. Impaired gait   Functional Limitations perform activities of daily living   Symptom Location L hip lateral portion   How/Where did it occur With repetition/overuse   Onset date of current episode/exacerbation 02/02/22   Chronicity Chronic   Pain rating (0-10 point scale) Worst (/10)   Worst (/10) 5   Pain quality C. Aching   Frequency of pain/symptoms C. With activity   Pain/symptoms are: Worse during the night   Pain/symptoms exacerbated by C. Lifting;I. Bending   Pain/symptoms eased by B. Walking;E. Changing positions;I. OTC medication(s);J. Braces/supports   Progression of symptoms since onset: Unchanged   Current Level of Function   Patient role/employment history F. Retired   Fall Risk Screen   Fall screen completed by PT   Have you fallen 2 or more times in the past year? No   Have you fallen  and had an injury in the past year? Yes   Timed Up and Go score (seconds) 15.59 sec and 13.78 sec   Is patient a fall risk? Yes;Department fall risk interventions implemented   Abuse Screen (yes response referral indicated)   Feels Unsafe at Home or Work/School no   Feels Threatened by Someone no   Does Anyone Try to Keep You From Having Contact with Others or Doing Things Outside Your Home? no   Physical Signs of Abuse Present no   Hip Objective Findings   Gait/Locomotion 2 walking sticks; slow mobility; shuffling gait   Balance/Proprioception (Single Leg Stance) tandem stance- 2-3 sec holds   Side (if bilateral, select both right and left) Left   Hip Flexibility Comments tightness within the hip and limited ROM   Lumbar ROM fair with flex; limited extension   Pelvic Screen no noted tenderness with palpation; - LLD: - sacral rotation   Resisted Hip Adduction Test -   Straight Leg Raise Test -   MARK Test -   Palpation Tenderness to insertion of TFL   Posture fwd fold; pt is currently wearing a brace on her upper back to keep her upright and to hold her shoulders back   Stevie Flexibility Test +   Obers/ITB Flexibility +   Left Hamstring Flexibility +   Left Hip IR PROM limited in 90/90   Left Hip Flexion Strength 4-   Left Hip Abduction Strength 3+   Left Hip IR Strength limited in ROM 3   Left Hip ER Strength 4-   Left Knee Flexion Strength 4   Planned Therapy Interventions   Planned Therapy Interventions balance training;gait training;joint mobilization;manual therapy;neuromuscular re-education;ROM;strengthening;stretching   Planned Modality Interventions   Planned Modality Interventions Electrical stimulation;TENS;Ultrasound   Clinical Impression   Criteria for Skilled Therapeutic Interventions Met yes, treatment indicated   PT Diagnosis L hip pain   Influenced by the following impairments weakness, balance problem, gait abnormality   Functional limitations due to impairments ambulation, squatting, standing    Clinical Presentation Stable/Uncomplicated   Clinical Presentation Rationale chronic condition with pain for months with on and off Sx throughout the day   Clinical Decision Making (Complexity) Low complexity   Therapy Frequency 2 times/Week   Predicted Duration of Therapy Intervention (days/wks) 8   Risk & Benefits of therapy have been explained Yes   Patient, Family & other staff in agreement with plan of care Yes   ORTHO GOALS   PT Ortho Eval Goals 1;2;3;4   Ortho Goal 1   Goal Identifier ST HEP goal   Goal Description Pt will be independent with her HEP in 2 weeks to be able to strengthen independently at home.   Target Date 07/05/22   Ortho Goal 2   Goal Identifier ST TUG goal   Goal Description Pt will achieve a TUG time of 11 sec in 4 weeks to improve her balance.   Target Date 07/19/22   Ortho Goal 3   Goal Identifier LT strength goal   Goal Description Pt will have 5/5 LE strength in 8 weeks to be able to ambulate longer distances.   Target Date 08/30/22   Ortho Goal 4   Goal Identifier LT Balance Goal   Goal Description Pt will be able to hold a tandem stance for 15 sec to improve her balance and decrease her changes of falls in 8 weeks.   Target Date 08/30/22   Total Evaluation Time   PT Eval, Low Complexity Minutes (08587) 30   Therapy Certification   Certification date from 06/21/22   Certification date to 08/30/22   Medical Diagnosis Strain of flexor muscle of left hip, initial encounter (S76.012A)  - Primary     Hip pain, left (M25.552)       Hayley Dave, PT, DPT

## 2022-06-21 NOTE — PROGRESS NOTES
[facility]    OUTPATIENT PHYSICAL THERAPY ORTHOPEDIC EVALUATION  PLAN OF TREATMENT FOR OUTPATIENT REHABILITATION  (COMPLETE FOR INITIAL CLAIMS ONLY)  Patient's Last Name, First Name, M.I.  YOB: 1950  Nroa Alarcon       Provider s Name:  [facility]   Medical Record No.  [id number]   Start of Care Date:  06/21/22   Onset Date:  02/02/22   Type:     _X__PT   ___OT   ___SLP Medical Diagnosis:  Strain of flexor muscle of left hip, initial encounter (S76.012A)  - Primary     Hip pain, left (M25.552)     PT Diagnosis:  L hip pain   Visits from SOC:  1      _________________________________________________________________________________  Plan of Treatment/Functional Goals:  balance training, gait training, joint mobilization, manual therapy, neuromuscular re-education, ROM, strengthening, stretching     Electrical stimulation, TENS, Ultrasound     Goals  Goal Identifier: ST HEP goal  Goal Description: Pt will be independent with her HEP in 2 weeks to be able to strengthen independently at home.  Target Date: 07/05/22    Goal Identifier: ST TUG goal  Goal Description: Pt will achieve a TUG time of 11 sec in 4 weeks to improve her balance.  Target Date: 07/19/22    Goal Identifier: LT strength goal  Goal Description: Pt will have 5/5 LE strength in 8 weeks to be able to ambulate longer distances.  Target Date: 08/30/22    Goal Identifier: LT Balance Goal  Goal Description: Pt will be able to hold a tandem stance for 15 sec to improve her balance and decrease her changes of falls in 8 weeks.  Target Date: 08/30/22                                                Therapy Frequency:  2 times/Week  Predicted Duration of Therapy Intervention:  8    Hayley Dave PT                 I CERTIFY THE NEED FOR THESE SERVICES FURNISHED UNDER        THIS PLAN OF TREATMENT AND WHILE  UNDER MY CARE     (Physician co-signature of this document indicates review and certification of the therapy plan).                       Certification Date From:  06/21/22   Certification Date To:  08/30/22    Referring Provider:  Dr. Polanco    Initial Assessment        See Epic Evaluation Start of Care Date: 06/21/22

## 2022-07-01 ENCOUNTER — TELEPHONE (OUTPATIENT)
Dept: FAMILY MEDICINE | Facility: CLINIC | Age: 72
End: 2022-07-01

## 2022-07-01 NOTE — TELEPHONE ENCOUNTER
Patient calls stating she tried to get a refill of losartan 25 mg from [facility], but they won't fill it.    RN sees Rx sent by us on 1/27/22 for a year supply, so patient should have refills on file with them.  It sounds like patient called them with her bottle (which may have old Rx number on it), and tried to use their automated refill line.   RN advised patient to call them and speak with staff directly regarding her request, as the Rx we sent 1/27/22 is active and shows they did receive it. She will reach out to [facility].     Amelia Mandujano RN  [facility]

## 2022-07-08 ENCOUNTER — HOSPITAL ENCOUNTER (OUTPATIENT)
Dept: PHYSICAL THERAPY | Facility: CLINIC | Age: 72
Setting detail: THERAPIES SERIES
Discharge: HOME | End: 2022-07-08
Attending: PEDIATRICS
Payer: COMMERCIAL

## 2022-07-08 PROCEDURE — 97110 THERAPEUTIC EXERCISES: CPT | Mod: GP | Performed by: PHYSICAL THERAPIST

## 2022-07-22 ENCOUNTER — HOSPITAL ENCOUNTER (OUTPATIENT)
Dept: PHYSICAL THERAPY | Facility: CLINIC | Age: 72
Setting detail: THERAPIES SERIES
Discharge: HOME | End: 2022-07-22
Attending: PEDIATRICS
Payer: COMMERCIAL

## 2022-07-22 PROCEDURE — 97110 THERAPEUTIC EXERCISES: CPT | Mod: GP | Performed by: PHYSICAL THERAPIST

## 2022-07-22 PROCEDURE — 97140 MANUAL THERAPY 1/> REGIONS: CPT | Mod: GP | Performed by: PHYSICAL THERAPIST

## 2022-08-11 ENCOUNTER — TELEPHONE (OUTPATIENT)
Dept: FAMILY MEDICINE | Facility: CLINIC | Age: 72
End: 2022-08-11

## 2022-08-11 NOTE — TELEPHONE ENCOUNTER
Panel Management:    Patient's blood pressure was elevated at a recent clinic visit.  It is important to get blood pressure < 140/90    Please contact the patient and have them schedule RN visit for free blood pressure check       Patient also due for medicare wellness visit

## 2022-08-15 NOTE — TELEPHONE ENCOUNTER
Patient Quality Outreach    Patient is due for the following:   Hypertension -  BP check    Next Steps:   Schedule a nurse only visit for htn    Type of outreach:    Phone, spoke to patient/parent. Appt scheduled for 8/16/22.      Questions for provider review:    None     Allie Alfred, [facility]

## 2022-08-16 ENCOUNTER — MEDICAL CORRESPONDENCE (OUTPATIENT)
Dept: FAMILY MEDICINE | Facility: CLINIC | Age: 72
End: 2022-08-16

## 2022-08-16 ENCOUNTER — TELEPHONE (OUTPATIENT)
Dept: FAMILY MEDICINE | Facility: CLINIC | Age: 72
End: 2022-08-16

## 2022-08-16 ENCOUNTER — ALLIED HEALTH/NURSE VISIT (OUTPATIENT)
Dept: FAMILY MEDICINE | Facility: CLINIC | Age: 72
End: 2022-08-16
Payer: COMMERCIAL

## 2022-08-16 VITALS — SYSTOLIC BLOOD PRESSURE: 166 MMHG | HEART RATE: 88 BPM | DIASTOLIC BLOOD PRESSURE: 76 MMHG

## 2022-08-16 DIAGNOSIS — I10 ESSENTIAL HYPERTENSION WITH GOAL BLOOD PRESSURE LESS THAN 140/90: Primary | ICD-10-CM

## 2022-08-16 DIAGNOSIS — I10 WHITE COAT SYNDROME WITH HYPERTENSION: ICD-10-CM

## 2022-08-16 PROCEDURE — 99207 PR NO CHARGE NURSE ONLY: CPT

## 2022-08-16 NOTE — TELEPHONE ENCOUNTER
Blood pressure is consistently well controlled at home - reviewed home tracker.  No changes to medications.  She has white coat hypertension

## 2022-08-16 NOTE — NURSING NOTE
BP rechecked by MARIANELA Alfred MA  166/76    Patient brought list of bp readings from home.  Advised the patient information would be sent to her provider, will call her with any recommendations.

## 2022-08-16 NOTE — NURSING NOTE
Nora Alarcon is a 71 year old year old patient who comes in today for a Blood Pressure check because of ongoing blood pressure monitoring. Has had a stroke in the past.  Vital Signs as repeated by Nisreen Melendez MA  Patient is taking medication as prescribed  Patient is tolerating medications well.  Patient is monitoring Blood Pressure at home.  Average readings if yes are 126/67, pulse 80  Current complaints: none  Disposition:  Routing chart to provider. Patients bp went down a little bit but was still high.  Second blood pressure take by Ayleen Alfred MA.

## 2022-08-16 NOTE — TELEPHONE ENCOUNTER
Nora Alarcon is a 71 year old year old patient who comes in today for a Blood Pressure check because of ongoing blood pressure monitoring. Has had a stroke in the past.  Vital Signs as repeated by Nisreen Melendez MA  Patient is taking medication as prescribed  Patient is tolerating medications well.  Patient is monitoring Blood Pressure at home.  Average readings if yes are 126/67, pulse 80  Current complaints: none  Disposition:  Routing chart to provider. Patients bp went down a little bit but was still high.  Second blood pressure take by Ayleen Alfred MA.      BP rechecked by MARIANELA Alfred MA  166/76    Patient brought list of bp readings from home.  Advised the patient information would be sent to her provider, will call her with any recommendations.

## 2022-09-08 ENCOUNTER — OFFICE VISIT (OUTPATIENT)
Dept: FAMILY MEDICINE | Facility: CLINIC | Age: 72
End: 2022-09-08
Payer: COMMERCIAL

## 2022-09-08 VITALS
HEART RATE: 80 BPM | DIASTOLIC BLOOD PRESSURE: 74 MMHG | TEMPERATURE: 98.3 F | WEIGHT: 111 LBS | HEIGHT: 66 IN | SYSTOLIC BLOOD PRESSURE: 128 MMHG | BODY MASS INDEX: 17.84 KG/M2 | RESPIRATION RATE: 18 BRPM

## 2022-09-08 DIAGNOSIS — H61.23 BILATERAL IMPACTED CERUMEN: Primary | ICD-10-CM

## 2022-09-08 PROCEDURE — 69209 REMOVE IMPACTED EAR WAX UNI: CPT | Mod: 50 | Performed by: NURSE PRACTITIONER

## 2022-09-08 RX ORDER — PAROXETINE 10 MG/1
TABLET, FILM COATED ORAL
COMMUNITY
Start: 2022-08-14

## 2022-09-08 ASSESSMENT — PAIN SCALES - GENERAL: PAINLEVEL: NO PAIN (0)

## 2022-09-08 NOTE — PROGRESS NOTES
"  Assessment & Plan     (H61.23) Bilateral impacted cerumen  (primary encounter diagnosis)  Comment:   Plan:     Bilateral ear irrigation preformed by CMA   No follow-ups on file.    JESUS Francois CNP  [facility]    Nasreen Melendez is a 71 year old, presenting for the following health issues:  No chief complaint on file.      HPI     Patient's right ear has felt plugged for about one month. No pain.        Review of Systems   Constitutional, HEENT, cardiovascular, pulmonary, gi and gu systems are negative, except as otherwise noted.      Objective    /74   Pulse 80   Temp 98.3  F (36.8  C) (Tympanic)   Resp 18   Ht 1.676 m (5' 6\")   Wt 50.3 kg (111 lb)   BMI 17.92 kg/m    There is no height or weight on file to calculate BMI.  Physical Exam   GENERAL: healthy, alert and no distress  EYES: Eyes grossly normal to inspection, PERRL and conjunctivae and sclerae normal  HENT: ear canals and TM's normal post removal of cerumen, nose and mouth without ulcers or lesions  RESP: lungs clear to auscultation - no rales, rhonchi or wheezes  CV: regular rate and rhythm, normal S1 S2, no S3 or S4, no murmur, click or rub, no peripheral edema and peripheral pulses strong  PSYCH: mentation appears normal, affect normal/bright        "

## 2022-09-09 NOTE — PROGRESS NOTES
[facility]    Outpatient Physical Therapy Discharge Note  Patient: Nora Alarcon  : 1950    Beginning/End Dates of Reporting Period:  22 to 22    Referring Provider: Dr. Polanco    Therapy Diagnosis: L hip pain     Client Self Report: Pt notes her pain is about the same as before without an increase in Sx with her lateral hip Sx. Pt came in today with 2 walking sticks. Wide HAIR and looks unstable with her gait pattern.    Objective Measurements:  Objective Measure: TUG  Details: 14.84 sec       Goals:  Goal Identifier ST HEP goal   Goal Description Pt will be independent with her HEP in 2 weeks to be able to strengthen independently at home.   Target Date 22   Date Met  22   Progress (detail required for progress note):       Goal Identifier ST TUG goal   Goal Description Pt will achieve a TUG time of 11 sec in 4 weeks to improve her balance.   Target Date 22   Date Met      Progress (detail required for progress note):       Goal Identifier LT strength goal   Goal Description Pt will have 5/5 LE strength in 8 weeks to be able to ambulate longer distances.   Target Date 22   Date Met      Progress (detail required for progress note):       Goal Identifier LT Balance Goal   Goal Description Pt will be able to hold a tandem stance for 15 sec to improve her balance and decrease her changes of falls in 8 weeks.   Target Date 22   Date Met      Progress (detail required for progress note):         Plan:  Discharge from therapy.    Discharge:    Reason for Discharge: Patient has failed to schedule further appointments. Pt cancelled her last appointment and failed to schedule further appointments.    Equipment Issued: none    Discharge Plan: Patient to continue home program.

## 2022-10-25 ENCOUNTER — TELEPHONE (OUTPATIENT)
Dept: ORTHOPEDICS | Facility: CLINIC | Age: 72
End: 2022-10-25

## 2022-10-25 DIAGNOSIS — S76.012A STRAIN OF FLEXOR MUSCLE OF LEFT HIP, INITIAL ENCOUNTER: ICD-10-CM

## 2022-10-25 DIAGNOSIS — M25.552 HIP PAIN, LEFT: Primary | ICD-10-CM

## 2022-10-25 NOTE — TELEPHONE ENCOUNTER
[facility] Call Center    Phone Message    May a detailed message be left on voicemail: yes     Reason for Call: Order(s): Other:   Reason for requested: Physical Therapy for her Hip  Date needed: today  Provider name: Collin       Action Taken: Message routed to:  [facility] ortho    Travel Screening: Not Applicable     Patient is being told by PT that since it has been 2 mo. Since she was last seen , she will needs new orders.    She would like to speak with Dr. Polanco's team regarding this        Please call her CELL # [phone]

## 2022-10-26 NOTE — TELEPHONE ENCOUNTER
Please call to discuss symptoms.  If new symptoms or worsening would advise follow up visit with me, had discussed MRI if symptoms worsen.    If symptoms are similar, ok to place additional PT referral, would recommend follow up ~ 6 weeks if no improvement    Let me know if additional questions.  Evelyne Polanco MD

## 2022-10-28 NOTE — TELEPHONE ENCOUNTER
FW: PHI - Need new referral  Received: Today  Evelyne Polanco MD  P [facility]         Previous Messages     ----- Message -----   From: Sushma Patel PTA   Sent: 10/28/2022   2:07 PM CDT   To: Evelyne Polanco MD   Subject: PHI - Need new referral                           Hey,     Hey,   just needing an updated referral for this patient. She was last seen in 7/22/22 and is wanting to start up in physical therapy again.     If you could let me know when this is taken care of that would be great so we could look at scheduling more PT for her.     Thanks   Sushma VERA

## 2022-11-03 ENCOUNTER — IMMUNIZATION (OUTPATIENT)
Dept: FAMILY MEDICINE | Facility: CLINIC | Age: 72
End: 2022-11-03
Payer: COMMERCIAL

## 2022-11-03 PROCEDURE — 0134A COVID-19,PF,MODERNA BIVALENT: CPT

## 2022-11-03 PROCEDURE — 91313 COVID-19,PF,MODERNA BIVALENT: CPT

## 2022-11-21 ENCOUNTER — TELEPHONE (OUTPATIENT)
Dept: FAMILY MEDICINE | Facility: CLINIC | Age: 72
End: 2022-11-21

## 2022-11-21 ENCOUNTER — NURSE TRIAGE (OUTPATIENT)
Dept: NURSING | Facility: CLINIC | Age: 72
End: 2022-11-21

## 2022-11-21 ENCOUNTER — OFFICE VISIT (OUTPATIENT)
Dept: FAMILY MEDICINE | Facility: CLINIC | Age: 72
End: 2022-11-21
Payer: COMMERCIAL

## 2022-11-21 ENCOUNTER — ANCILLARY PROCEDURE (OUTPATIENT)
Dept: GENERAL RADIOLOGY | Facility: CLINIC | Age: 72
End: 2022-11-21
Attending: NURSE PRACTITIONER
Payer: COMMERCIAL

## 2022-11-21 VITALS
WEIGHT: 120 LBS | HEIGHT: 66 IN | BODY MASS INDEX: 19.29 KG/M2 | OXYGEN SATURATION: 100 % | SYSTOLIC BLOOD PRESSURE: 154 MMHG | DIASTOLIC BLOOD PRESSURE: 90 MMHG | RESPIRATION RATE: 18 BRPM | TEMPERATURE: 98.8 F | HEART RATE: 93 BPM

## 2022-11-21 DIAGNOSIS — S82.142A CLOSED FRACTURE OF LEFT TIBIAL PLATEAU, INITIAL ENCOUNTER: Primary | ICD-10-CM

## 2022-11-21 DIAGNOSIS — S89.92XA KNEE INJURY, LEFT, INITIAL ENCOUNTER: ICD-10-CM

## 2022-11-21 LAB — RADIOLOGIST FLAGS: ABNORMAL

## 2022-11-21 PROCEDURE — 73562 X-RAY EXAM OF KNEE 3: CPT | Mod: TC | Performed by: RADIOLOGY

## 2022-11-21 PROCEDURE — 99213 OFFICE O/P EST LOW 20 MIN: CPT | Performed by: NURSE PRACTITIONER

## 2022-11-21 ASSESSMENT — PAIN SCALES - GENERAL: PAINLEVEL: WORST PAIN (10)

## 2022-11-21 NOTE — PATIENT INSTRUCTIONS
Use the knee brace for support.  Ice your knee for 20 minutes 3 times daily.  Start PT.  Follow up with orthopedics.

## 2022-11-21 NOTE — PROGRESS NOTES
"  Assessment & Plan     Closed fracture of left tibial plateau, initial encounter  Discussed using brace, icing, elevating at rest. NSAIDs as needed for pain.  She is currently using crutches to ambulate, she will continue this. Ortho referral placed. PT referral also placed. Discussed removing throw rugs from home.   - XR Knee Left 3 Views; Future  - Orthopedic  Referral; Future  - Physical Therapy Referral; Future      Patient Instructions   Use the knee brace for support.  Ice your knee for 20 minutes 3 times daily.  Start PT.  Follow up with orthopedics.      Return in about 1 week (around 11/28/2022) for worsening or continued symptoms.    Lisandra Singh, JESUS CNP  [facility]    Nasreen Melendez is a 71 year old, presenting for the following health issues:  Knee Pain (left)      HPI     Per triage, Nora was to come in to clinic as she slipped on a rug last night and has had left knee pain and swelling since.     Above HPI reviewed. Additionally, tripped and fell after slipping on a rug, landed directly on left knee. Pain, swelling to lateral knee. Cannot fully flex or extend the knee. Unable to bear weight without assistive device. Previous surgery to this knee many years ago, \"ligament repair\".      Review of Systems   Constitutional, HEENT, cardiovascular, pulmonary, gi and gu systems are negative, except as otherwise noted.      Objective    BP (!) 154/90   Pulse 93   Temp 98.8  F (37.1  C) (Tympanic)   Resp 18   Ht 1.676 m (5' 6\")   Wt 54.4 kg (120 lb)   SpO2 100%   BMI 19.37 kg/m    Body mass index is 19.37 kg/m .  Physical Exam  Vitals and nursing note reviewed.   Constitutional:       General: She is not in acute distress.     Appearance: Normal appearance.   HENT:      Head: Normocephalic and atraumatic.      Mouth/Throat:      Mouth: Mucous membranes are moist.   Cardiovascular:      Rate and Rhythm: Normal rate.   Pulmonary:      Effort: Pulmonary " effort is normal.   Musculoskeletal:      Cervical back: Neck supple.      Comments: Old scar to medial joint line of left knee. Diffuse swelling of left knee, TTP of lateral joint line. ROM limited by pain, unable to tolerate special testing. No TTP of distal femur, distal tibia, fibula   Skin:     General: Skin is warm and dry.   Neurological:      General: No focal deficit present.      Mental Status: She is alert.   Psychiatric:         Mood and Affect: Mood normal.         Behavior: Behavior normal.            Xray - Reviewed and interpreted by me.  Left knee - non displaced tibial plateau fx.

## 2022-11-21 NOTE — TELEPHONE ENCOUNTER
Coral from [facility] imaging outreach is calling with abnormal Left knee x ray. See x ray results for fracture of the left lateral tibial plateau.    Narrative & Impression   EXAM: KNEE LEFT THREE VIEWS  DATE/TIME: 11/21/2022 9:55 AM      INDICATION: Left-sided knee pain after a fall.   COMPARISON: None.                                                                      IMPRESSION:  1.  Nondisplaced minimally impacted intra-articular fracture of the  left lateral tibial plateau.  2.  Large left knee lipohemarthrosis.  3.  Moderate left knee tricompartmental degenerative arthrosis.  4.  Bone demineralization.      Will route to ordering provider, Lisandra Singh    Thank you,  Laury Hood RN

## 2022-11-21 NOTE — TELEPHONE ENCOUNTER
Pt is phoning stating that her left knee is swollen and painful     Pt states that knee is swollen on the left side and that she noticed the swelling last night    Pt states that she slipped on a rug last night and is when the swelling began     Rates pain 10/10    Pt has been icing and elevating left knee     Per Disposition: See HCP Within 4 Hours (Or PCP Triage)    Pt transferred to [facility] and if no appointment available by 10am this morning, pt will be going to [facility] for evaluation     Care advice given per protocol and when to call back. Pt verbalized understanding and agrees to plan of care.    Rosario Benavides RN  [facility] Nurse Advisor  6:40 AM 11/21/2022      COVID 19 Nurse Triage Plan/Patient Instructions    Please be aware that novel coronavirus (COVID-19) may be circulating in the community. If you develop symptoms such as fever, cough, or SOB or if you have concerns about the presence of another infection including coronavirus (COVID-19), please contact your health care provider or visit [email or web address].     Disposition/Instructions    In-Person Visit with provider recommended. Reference Visit Selection Guide.    Thank you for taking steps to prevent the spread of this virus.  o Limit your contact with others.  o Wear a simple mask to cover your cough.  o Wash your hands well and often.    Resources    [facility]: About COVID-19: [email or web address]    CDC: What to Do If You're Sick: www.cdc.gov/coronavirus/2019-ncov/about/steps-when-sick.html    CDC: Ending Home Isolation: www.cdc.gov/coronavirus/2019-ncov/hcp/disposition-in-home-patients.html     CDC: Caring for Someone: www.cdc.gov/coronavirus/2019-ncov/if-you-are-sick/care-for-someone.html     [facility]: Interim Guidance for Hospital Discharge to Home: www.health.[email or web address].[place].us/diseases/coronavirus/hcp/hospdischarge.pdf    [facility] clinical trials (COVID-19 research studies):  [email or web address]-trials     Below are the COVID-19 hotlines at the [place] Department of Health ([facility]). Interpreters are available.   o For health questions: Call 509-666-3105 or 1-450.480.2202 (7 a.m. to 7 p.m.)  o For questions about schools and childcare: Call 455-986-2058 or 1-299.351.6087 (7 a.m. to 7 p.m.)                     Reason for Disposition    [1] SEVERE pain AND [2] not improved 2 hours after pain medicine/ice packs    Additional Information    Negative: Serious injury with multiple fractures (broken bones)    Negative: [1] Major bleeding (e.g., actively dripping or spurting) AND [2] can't be stopped    Negative: Looks like a dislocated joint or knee cap (crooked or deformed)    Negative: Bullet wound, stabbed by knife, or other serious penetrating wound    Negative: Sounds like a life-threatening emergency to the triager    Negative: Wound looks infected    Negative: Can't stand (bear weight) or walk    Negative: Skin is split open or gaping (or length > 1/2 inch or 12 mm)    Negative: [1] Bleeding AND [2] won't stop after 10 minutes of direct pressure (using correct technique)    Negative: [1] Dirt in the wound AND [2] not removed with 15 minutes of scrubbing    Negative: Sounds like a serious injury to the triager    Protocols used: KNEE INJURY-A-AH

## 2022-11-25 ENCOUNTER — HOSPITAL ENCOUNTER (EMERGENCY)
Facility: CLINIC | Age: 72
Discharge: HOME | End: 2022-11-25
Attending: NURSE PRACTITIONER | Admitting: NURSE PRACTITIONER
Payer: COMMERCIAL

## 2022-11-25 ENCOUNTER — TELEPHONE (OUTPATIENT)
Dept: FAMILY MEDICINE | Facility: CLINIC | Age: 72
End: 2022-11-25

## 2022-11-25 VITALS
OXYGEN SATURATION: 97 % | SYSTOLIC BLOOD PRESSURE: 159 MMHG | HEART RATE: 83 BPM | DIASTOLIC BLOOD PRESSURE: 47 MMHG | TEMPERATURE: 98.1 F | RESPIRATION RATE: 20 BRPM

## 2022-11-25 DIAGNOSIS — S82.142D CLOSED FRACTURE OF LEFT TIBIAL PLATEAU WITH ROUTINE HEALING, SUBSEQUENT ENCOUNTER: Primary | ICD-10-CM

## 2022-11-25 DIAGNOSIS — S82.122A CLOSED FRACTURE OF LATERAL PORTION OF LEFT TIBIAL PLATEAU, INITIAL ENCOUNTER: ICD-10-CM

## 2022-11-25 PROCEDURE — 29505 APPLICATION LONG LEG SPLINT: CPT | Mod: LT | Performed by: NURSE PRACTITIONER

## 2022-11-25 PROCEDURE — G0463 HOSPITAL OUTPT CLINIC VISIT: HCPCS | Mod: 25 | Performed by: NURSE PRACTITIONER

## 2022-11-25 PROCEDURE — 99213 OFFICE O/P EST LOW 20 MIN: CPT | Performed by: NURSE PRACTITIONER

## 2022-11-25 RX ORDER — OXYCODONE HYDROCHLORIDE 5 MG/1
5 TABLET ORAL EVERY 6 HOURS PRN
Qty: 12 TABLET | Refills: 0 | Status: SHIPPED | OUTPATIENT
Start: 2022-11-25 | End: 2022-11-28

## 2022-11-25 NOTE — TELEPHONE ENCOUNTER
Symptoms    Describe your symptoms: PAIN IN LEFT KNEE. Slipped and did the splitz. In a lot of pain it happened a couple of days ago. Saw Lisandra Munguia and she told me to ice it and take advil.    Any pain: Yes: still have an awful lot of pain.     How long have you been having symptoms: had xray  On the 21st and need to talk to someone about this pain    Have you been seen for this:  Yes:    Triage offered?: Yes: SENT TO [facility]    [place] remedies tried:ADVIL AND ICE    Preferred Pharmacy:   [id number] - [address] [address]  Phone: [phone] Fax: [phone]

## 2022-11-26 NOTE — DISCHARGE INSTRUCTIONS
I recommend wearing the knee immobilizer at all times unless showering.  I recommend using a walker for ambulation.  You should be nonweightbearing to your left leg.  For pain management I recommend Tylenol 1000 mg 3 times daily.  When pain is more severe than this you may add 1 oxycodone at the same time.  You should not take more than 1 oxycodone 3 times daily.  Oxycodone is a narcotic and can cause high addiction.  It can also cause severe constipation.  If you take oxycodone you should also be taking the Senokot that you said that you have at home once or twice daily.  Follow-up with orthopedics this upcoming week.
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
## 2022-11-29 ENCOUNTER — TELEPHONE (OUTPATIENT)
Dept: FAMILY MEDICINE | Facility: CLINIC | Age: 72
End: 2022-11-29

## 2022-11-29 NOTE — TELEPHONE ENCOUNTER
Patient Quality Outreach    Patient is due for the following:   Hypertension -  BP check  Physical Annual Wellness Visit    Next Steps:   Patient has upcoming appointment, these items will be addressed at that time.    Type of outreach:    Chart review performed, no outreach needed.      Questions for provider review:    None     Cait Zuniga MA

## 2022-12-01 NOTE — PROGRESS NOTES
ASSESSMENT & PLAN    Nora was seen today for pain.    Diagnoses and all orders for this visit:    Closed fracture of left tibial plateau, initial encounter  -     Orthopedic [facility] Referral  -     XR Knee Standing AP Bilat [place] Bilat Lat Left; Future  -     CT Knee Left w/o Contrast; Future    Arthritis of left knee      This issue is acute and Unchanged.    We discussed these other possible diagnosis: Tibial plateau fracture in the setting of severe arthritis and chondrocalcinosis.  Will obtain CT imaging to better guide next steps in treatment    Plan:  - Today's Plan of Care:  CT of left knee  Continue immobilizer and limited weight bearing with walker in the interim.  Continue with relative rest and activity modification, Ice, Compression, and Elevation.  Can apply ice 10-15 minutes 3-4 times per day as needed. OTC medications as needed.    -We also discussed other future treatment options:  Referral to Orthopedic Surgery    Follow Up: 1 week    Concerning signs and symptoms were reviewed.  The patient expressed understanding of this management plan and all questions were answered at this time.    Evelyne Polanco MD [facility]  Sports Medicine Physician  [facility]      -----  Chief Complaint   Patient presents with     Left Knee - Pain       SUBJECTIVE  Nora Alarcon is a/an 71 year old female who is seen in consultation at the request of  Lisandra Singh C.N.P. for evaluation of the left knee injury.    The patient is seen by themselves.  The patient is Right handed    Onset: 17 day(s) ago. Patient describes injury as she slipped on a rug and fell on the left knee  Location of Pain: left anterior aspect of her knee  Worsened by: nothing  Better with: knee immobilizer, walker, oxycodone at night  Treatments tried: rest/activity avoidance, ibuprofen and other medications: Oxycodone/Acetaminophen (Percocet) Use of a walker and knee immoblizer  Associated symptoms:  "swelling    Orthopedic/Surgical history: YES - Previous surgery to this knee many years ago, \"ligament repair\".  Social History/Occupation: She is retired    No family history pertinent to patient's problem today.    REVIEW OF SYSTEMS:  Review of Systems  Skin: yes bruising, yes swelling  Musculoskeletal: as above  Neurologic: no numbness, paresthesias  Remainder of review of systems is negative including constitutional, CV, pulmonary, GI, except as noted in HPI or medical history.    OBJECTIVE:  BP (!) 149/79   Pulse 93   Ht 1.676 m (5' 6\")   Wt 54.4 kg (120 lb)   BMI 19.37 kg/m     General: healthy, alert and in no distress  HEENT: no scleral icterus or conjunctival erythema  Skin: no suspicious lesions or rash. No jaundice.  CV: distal perfusion intact  Resp: normal respiratory effort without conversational dyspnea   Psych: normal mood and affect  Gait: antalgic  Neuro: Normal light sensory exam of lower extremity    Left Knee exam    Inspection:      mild effusion left       mild swelling left    Patella:      Crepitus noted in the patellofemoral joint bilateral    Tender:      medial joint line left       lateral joint line left    Non Tender:      remainder of knee area bilateral    Knee ROM:      Flexion 90 degrees left       Extension 0 degrees left    Hip ROM:     No pain with hip ROM    Strength:      5-/5 with knee extension left    Special Tests:     Difficult due to exam in chair, stable to varus and valgus stress    Gait:      antalgic gait with walker    Neurovascular:      2+ peripheral pulses bilaterally and brisk capillary refill       sensation grossly intact       RADIOLOGY:  I independently ordered, visualized and reviewed these images with the patient  2 XR views of left knee reviewed today compared with x-rays from 11/21/2022 -lateral tibial plateau fracture mildly impacted with moderate arthritis and chondrocalcinosis      Review of prior external note(s) from - ER and PCP  Review of the " result(s) of each unique test - XR

## 2022-12-05 RX ORDER — OXYCODONE HYDROCHLORIDE 5 MG/1
5 TABLET ORAL EVERY 6 HOURS PRN
Qty: 12 TABLET | Refills: 0 | Status: SHIPPED | OUTPATIENT
Start: 2022-12-05 | End: 2022-12-08

## 2022-12-05 NOTE — TELEPHONE ENCOUNTER
Dr Hill or covering provider.  Pt is requesting a refill of oxycodone.   Pt continues to have left knee pain.  She was seen in the ED for this on 11/25.  She has appt with Orthopedics on 12/7.  Pt says pain is about the same , not worse.

## 2022-12-07 ENCOUNTER — ANCILLARY PROCEDURE (OUTPATIENT)
Dept: GENERAL RADIOLOGY | Facility: CLINIC | Age: 72
End: 2022-12-07
Attending: PEDIATRICS
Payer: COMMERCIAL

## 2022-12-07 ENCOUNTER — HOSPITAL ENCOUNTER (OUTPATIENT)
Dept: CT IMAGING | Facility: CLINIC | Age: 72
Discharge: HOME | End: 2022-12-07
Attending: PEDIATRICS | Admitting: PEDIATRICS
Payer: COMMERCIAL

## 2022-12-07 ENCOUNTER — OFFICE VISIT (OUTPATIENT)
Dept: ORTHOPEDICS | Facility: CLINIC | Age: 72
End: 2022-12-07
Attending: NURSE PRACTITIONER
Payer: COMMERCIAL

## 2022-12-07 VITALS
BODY MASS INDEX: 19.29 KG/M2 | DIASTOLIC BLOOD PRESSURE: 79 MMHG | HEART RATE: 93 BPM | WEIGHT: 120 LBS | HEIGHT: 66 IN | SYSTOLIC BLOOD PRESSURE: 149 MMHG

## 2022-12-07 DIAGNOSIS — S82.142A CLOSED FRACTURE OF LEFT TIBIAL PLATEAU, INITIAL ENCOUNTER: ICD-10-CM

## 2022-12-07 DIAGNOSIS — M17.12 ARTHRITIS OF LEFT KNEE: ICD-10-CM

## 2022-12-07 DIAGNOSIS — S82.142A CLOSED FRACTURE OF LEFT TIBIAL PLATEAU, INITIAL ENCOUNTER: Primary | ICD-10-CM

## 2022-12-07 PROCEDURE — 99214 OFFICE O/P EST MOD 30 MIN: CPT | Performed by: PEDIATRICS

## 2022-12-07 PROCEDURE — 73562 X-RAY EXAM OF KNEE 3: CPT | Mod: TC | Performed by: RADIOLOGY

## 2022-12-07 PROCEDURE — 73700 CT LOWER EXTREMITY W/O DYE: CPT | Mod: LT

## 2022-12-07 ASSESSMENT — PAIN SCALES - GENERAL: PAINLEVEL: SEVERE PAIN (6)

## 2022-12-07 NOTE — PATIENT INSTRUCTIONS
We discussed these other possible diagnosis: Tibial plateau fracture in the setting of severe arthritis and chondrocalcinosis.  Will obtain CT imaging to better guide next steps in treatment    Plan:  - Today's Plan of Care:  CT of left knee  Continue immobilizer and limited weight bearing with walker in the interim.  Continue with relative rest and activity modification, Ice, Compression, and Elevation.  Can apply ice 10-15 minutes 3-4 times per day as needed. OTC medications as needed.    -We also discussed other future treatment options:  Referral to Orthopedic Surgery    Follow Up: 1 week    If you have any further questions for your physician or physician s care team you can call [phone] and use option 3 to leave a voice message.

## 2022-12-07 NOTE — LETTER
12/7/2022         RE: Nora Alarcon  [address]  [place] [address]        Dear Colleague,    Thank you for referring your patient, Nora Alarcon, to the [facility] [place]. Please see a copy of my visit note below.    ASSESSMENT & PLAN    Nora was seen today for pain.    Diagnoses and all orders for this visit:    Closed fracture of left tibial plateau, initial encounter  -     Orthopedic  Referral  -     XR Knee Standing AP Bilat [place] Bilat Lat Left; Future  -     CT Knee Left w/o Contrast; Future    Arthritis of left knee      This issue is acute and Unchanged.    We discussed these other possible diagnosis: Tibial plateau fracture in the setting of severe arthritis and chondrocalcinosis.  Will obtain CT imaging to better guide next steps in treatment    Plan:  - Today's Plan of Care:  CT of left knee  Continue immobilizer and limited weight bearing with walker in the interim.  Continue with relative rest and activity modification, Ice, Compression, and Elevation.  Can apply ice 10-15 minutes 3-4 times per day as needed. OTC medications as needed.    -We also discussed other future treatment options:  Referral to Orthopedic Surgery    Follow Up: 1 week    Concerning signs and symptoms were reviewed.  The patient expressed understanding of this management plan and all questions were answered at this time.    Evelyne Polanco MD [facility]  Sports Medicine Physician  [facility]      -----  Chief Complaint   Patient presents with     Left Knee - Pain       SUBJECTIVE  Nora Alarcon is a/an 71 year old female who is seen in consultation at the request of  Lisandra Singh C.N.P. for evaluation of the left knee injury.    The patient is seen by themselves.  The patient is Right handed    Onset: 17 day(s) ago. Patient describes injury as she slipped on a rug and fell on the left knee  Location of Pain: left anterior aspect of her  "knee  Worsened by: nothing  Better with: knee immobilizer, walker, oxycodone at night  Treatments tried: rest/activity avoidance, ibuprofen and other medications: Oxycodone/Acetaminophen (Percocet) Use of a walker and knee immoblizer  Associated symptoms: swelling    Orthopedic/Surgical history: YES - Previous surgery to this knee many years ago, \"ligament repair\".  Social History/Occupation: She is retired    No family history pertinent to patient's problem today.    REVIEW OF SYSTEMS:  Review of Systems  Skin: yes bruising, yes swelling  Musculoskeletal: as above  Neurologic: no numbness, paresthesias  Remainder of review of systems is negative including constitutional, CV, pulmonary, GI, except as noted in HPI or medical history.    OBJECTIVE:  BP (!) 149/79   Pulse 93   Ht 1.676 m (5' 6\")   Wt 54.4 kg (120 lb)   BMI 19.37 kg/m     General: healthy, alert and in no distress  HEENT: no scleral icterus or conjunctival erythema  Skin: no suspicious lesions or rash. No jaundice.  CV: distal perfusion intact  Resp: normal respiratory effort without conversational dyspnea   Psych: normal mood and affect  Gait: antalgic  Neuro: Normal light sensory exam of lower extremity    Left Knee exam    Inspection:      mild effusion left       mild swelling left    Patella:      Crepitus noted in the patellofemoral joint bilateral    Tender:      medial joint line left       lateral joint line left    Non Tender:      remainder of knee area bilateral    Knee ROM:      Flexion 90 degrees left       Extension 0 degrees left    Hip ROM:     No pain with hip ROM    Strength:      5-/5 with knee extension left    Special Tests:     Difficult due to exam in chair, stable to varus and valgus stress    Gait:      antalgic gait with walker    Neurovascular:      2+ peripheral pulses bilaterally and brisk capillary refill       sensation grossly intact       RADIOLOGY:  I independently ordered, visualized and reviewed these images with " the patient  2 XR views of left knee reviewed today compared with x-rays from 11/21/2022 -lateral tibial plateau fracture mildly impacted with moderate arthritis and chondrocalcinosis      Review of prior external note(s) from - ER and PCP  Review of the result(s) of each unique test - XR               Again, thank you for allowing me to participate in the care of your patient.        Sincerely,        Evelyne Polanco MD

## 2022-12-09 NOTE — PROGRESS NOTES
ASSESSMENT & PLAN    Nora was seen today for follow up.    Diagnoses and all orders for this visit:    Closed fracture of left tibial plateau, initial encounter    Arthritis of left knee    Chondrocalcinosis      This issue is acute and Improving.    We discussed these other possible diagnosis: Minimally displaced tibial plateau fracture, reviewed with orthopedic surgery who recommended non-op treatment.  - Discussed most reliable surgical intervention is likely knee replacement, this would depend on chronic pain.    Plan:  - Today's Plan of Care:  Can wean out of immobilizer, can use sleeve for compression  Start Home Exercise Program - range of motion, quad sets, straight leg raises  Continue to use walker to limit weight bearing  Discussed activity considerations and other supportive care including Ice/Heat, OTC and other topical medications as needed.    -We also discussed other future treatment options:  Referral to Physical Therapy    Follow Up: 3 week with repeat x-rays (2 views of left knee, AP and lateral)    Concerning signs and symptoms were reviewed.  The patient expressed understanding of this management plan and all questions were answered at this time.    Evelyne Polanco MD [facility]  Sports Medicine Physician  [facility]      SUBJECTIVE- Interim History December 9, 2022    Chief Complaint   Patient presents with     Left Knee - Follow Up       Nora Alarcon is a 71 year old female who is seen in f/u up for    Closed fracture of left tibial plateau, initial encounter  Arthritis of left knee  Chondrocalcinosis.  Since last visit on 12/7/22  patient has been doing a little better, she has been using the knee immobilizer and walker.  - Now ~ 3 weeks from initial onset, here to review CT results    Location of Pain: left anterior aspect of her knee  Worsened by: nothing  Better with: knee immobilizer, walker, oxycodone at night  Treatments tried: rest/activity avoidance, ibuprofen and  "other medications: Oxycodone/Acetaminophen (Percocet) Use of a walker and knee immoblizer, previous imaging (xray and CT Scan 12/7/22)  Associated symptoms: swelling     Orthopedic/Surgical history: YES - Previous surgery to this knee many years ago, \"ligament repair\".  Social History/Occupation: She is retired     No family history pertinent to patient's problem today.    REVIEW OF SYSTEMS:  Review of Systems  Skin: no bruising, mild swelling  Musculoskeletal: as above  Neurologic: no numbness, paresthesias  Remainder of review of systems is negative including constitutional, CV, pulmonary, GI, except as noted in HPI or medical history.    OBJECTIVE:  BP (!) 149/85   Pulse 90   Ht 1.676 m (5' 6\")   Wt 54.4 kg (120 lb)   BMI 19.37 kg/m       General: healthy, alert and in no distress  HEENT: no scleral icterus or conjunctival erythema  Skin: no suspicious lesions or rash. No jaundice.  CV: distal perfusion intact  Resp: normal respiratory effort without conversational dyspnea   Psych: normal mood and affect  Gait: antalgic  Neuro: Normal light sensory exam of lower extremity     Left Knee exam  Inspection:      mild effusion left       mild swelling left  - well healed scar medial knee     Patella:      Crepitus noted in the patellofemoral joint bilateral     Tender:             lateral joint line left (mild)     Non Tender:      remainder of knee area bilateral     Knee ROM:      Flexion 90 degrees left       Extension 0 degrees left     Hip ROM:     No pain with hip ROM     Strength:      5-/5 with knee extension left     Special Tests:     Difficult due to exam in chair, stable to varus and valgus stress     Gait:      antalgic gait with walker     Neurovascular:      2+ peripheral pulses bilaterally and brisk capillary refill       sensation grossly intact    RADIOLOGY:  Final results and radiologist's interpretation, available in the [facility] health record.  Images were reviewed with the patient in the office " today.  My personal interpretation of the performed imaging:   Reviewed CT scan from 12/7/2022 with minimally depressed fracture of the central aspect of the left tibial plateau with early healing, advanced tricompartmental arthritis and bone demineralization      Review of the result(s) of each unique test - XR and CT

## 2022-12-13 ENCOUNTER — HOSPITAL ENCOUNTER (OUTPATIENT)
Dept: PHYSICAL THERAPY | Facility: CLINIC | Age: 72
Setting detail: THERAPIES SERIES
Discharge: HOME | End: 2022-12-13
Attending: PEDIATRICS
Payer: COMMERCIAL

## 2022-12-13 DIAGNOSIS — M25.552 HIP PAIN, LEFT: ICD-10-CM

## 2022-12-13 DIAGNOSIS — S76.012A STRAIN OF FLEXOR MUSCLE OF LEFT HIP, INITIAL ENCOUNTER: ICD-10-CM

## 2022-12-13 PROCEDURE — 97161 PT EVAL LOW COMPLEX 20 MIN: CPT | Mod: GP | Performed by: PHYSICAL THERAPIST

## 2022-12-13 PROCEDURE — 97110 THERAPEUTIC EXERCISES: CPT | Mod: GP | Performed by: PHYSICAL THERAPIST

## 2022-12-13 PROCEDURE — 97116 GAIT TRAINING THERAPY: CPT | Mod: GP | Performed by: PHYSICAL THERAPIST

## 2022-12-13 NOTE — PROGRESS NOTES
12/13/22 1300   General Information   Type of Visit Initial OP Ortho PT Evaluation   Start of Care Date 12/13/22   Referring Physician Evelyne Johnson MD   Patient/Family Goals Statement decrease pain   Orders Evaluate and Treat   Date of Order 10/28/22   Certification Required? Yes   Medical Diagnosis Hip pain, left (M25.552)     Strain of flexor muscle of left hip, initial encounter (S76.012A)    Closed fracture of left tibial plateau, initial encounter (S82.142A)  - Primary   Body Part(s)   Body Part(s) Knee   Presentation and Etiology   Pertinent history of current problem (include personal factors and/or comorbidities that impact the POC) Pt had a recent fall on 11/25/22 and slipped on a rug, landing on her knee. The pt has a tibial plateau fx at this time and is limited in WBing on the LE. She is in an immobilizer. Her order was originally for hip pain but has since decreased in pain due to limited activity at this time. The pt notes a majority of her pain at this time is in the knee. No noted numbness/tingling. Minimal WBing on the L LE   Impairments A. Pain;B. Decreased WB tolerance;C. Swelling;G. Impaired balance;H. Impaired gait   Functional Limitations perform activities of daily living   Symptom Location L knee and hip   How/Where did it occur With a fall   Onset date of current episode/exacerbation 11/25/22   Chronicity New   Pain rating (0-10 point scale) Best (/10);Worst (/10)   Best (/10) 5   Worst (/10) 8   Pain quality B. Dull   Frequency of pain/symptoms C. With activity   Pain/symptoms are: Worse during the day   Pain/symptoms exacerbated by A. Sitting;C. Lifting   Pain/symptoms eased by C. Rest;E. Changing positions;G. Heat;H. Cold;I. OTC medication(s);J. Braces/supports   Progression of symptoms since onset: Unchanged   Prior Level of Function   Functional Level Prior Comment pt was limited in some activities;  pt's  completed most of the household chores   Current Level of  Function   Patient role/employment history F. Retired   Fall Risk Screen   Fall screen completed by PT   Have you fallen 2 or more times in the past year? No   Have you fallen and had an injury in the past year? Yes   Is patient a fall risk? Yes;Department fall risk interventions implemented   Fall screen comments only able to minimally WBing on the LE and thus didn't complete a TUG at this time   Abuse Screen (yes response referral indicated)   Feels Unsafe at Home or Work/School no   Feels Threatened by Someone no   Does Anyone Try to Keep You From Having Contact with Others or Doing Things Outside Your Home? no   Physical Signs of Abuse Present no   Knee Objective Findings   Gait/Locomotion minimal WBing through the L LE, stand walker   Foot Position In Standing pt in a knee immobilizer   Knee/Hip Strength Comments hip flex- 4/5;  DF- 3+/5   Side (if bilateral, select both right and left) Left   Knee Special Test Comments NT due to recent fx   Left Knee Extension AROM full   Left Hip Abduction Strength 3-/5   L VMO Strength 3-/5   Planned Therapy Interventions   Planned Therapy Interventions balance training;gait training;joint mobilization;manual therapy;neuromuscular re-education;ROM;strengthening;stretching   Planned Modality Interventions   Planned Modality Interventions Cryotherapy;Electrical stimulation;TENS;Ultrasound;Hot packs   Clinical Impression   Criteria for Skilled Therapeutic Interventions Met yes, treatment indicated   PT Diagnosis L tibial plateau fx   Influenced by the following impairments decreased WBing, healing process at this time, weakness, decreased ROM   Functional limitations due to impairments ambulation, stairs, squatting, knee flexion   Clinical Presentation Stable/Uncomplicated   Clinical Presentation Rationale current condition at this time with being in the healing phase during this time and improvement yet to be made as healing continues; the pt is currently on weight  restrictions and limitations in her activities   Clinical Decision Making (Complexity) Low complexity   Therapy Frequency 1 time/week   Predicted Duration of Therapy Intervention (days/wks) 12 weeks   Risk & Benefits of therapy have been explained Yes   Patient, Family & other staff in agreement with plan of care Yes   Clinical Impression Comments pt prognosis is good. Pt would benefit from therapy at this time to address the above limitations.   ORTHO GOALS   PT Ortho Eval Goals 1;2;3   Ortho Goal 1   Goal Identifier ST goal   Goal Description Pt will have 110* of knee flexion in 4 weeks to ambulate without restriction.   Target Date 01/10/23   Ortho Goal 2   Goal Identifier LT goal   Goal Description Pt will be able to ambulate 500ft with a cane in 10 weeks to decrease AD use.   Target Date 02/21/23   Ortho Goal 3   Goal Identifier LT goal   Goal Description Pt will be independent in home strengthening program for self management of Sx in 12 weeks.   Target Date 03/07/23   Total Evaluation Time   PT Chari Low Complexity Minutes (92971) 10   Therapy Certification   Certification date from 12/13/22   Certification date to 03/07/23   Medical Diagnosis Hip pain, left (M25.552)     Strain of flexor muscle of left hip, initial encounter (S76.012A)    Closed fracture of left tibial plateau, initial encounter (S82.142A)  - Primary       Hayley Dave, PT, DPT

## 2022-12-13 NOTE — PROGRESS NOTES
[facility]    OUTPATIENT PHYSICAL THERAPY ORTHOPEDIC EVALUATION  PLAN OF TREATMENT FOR OUTPATIENT REHABILITATION  (COMPLETE FOR INITIAL CLAIMS ONLY)  Patient's Last Name, First Name, M.I.  YOB: 1950  Nora Alarcon    Provider s Name:  [facility]   Medical Record No.  [id number]   Start of Care Date:  12/13/22   Onset Date:  11/25/22   Type:     _X__PT   ___OT   ___SLP Medical Diagnosis:  Hip pain, left (M25.552)     Strain of flexor muscle of left hip, initial encounter (S76.012A)    Closed fracture of left tibial plateau, initial encounter (S82.142A)  - Primary     PT Diagnosis:  L tibial plateau fx   Visits from SOC:  1      _________________________________________________________________________________  Plan of Treatment/Functional Goals:  balance training, gait training, joint mobilization, manual therapy, neuromuscular re-education, ROM, strengthening, stretching     Cryotherapy, Electrical stimulation, TENS, Ultrasound, Hot packs     Goals  Goal Identifier: ST goal  Goal Description: Pt will have 110* of knee flexion in 4 weeks to ambulate without restriction.  Target Date: 01/10/23    Goal Identifier: LT goal  Goal Description: Pt will be able to ambulate 500ft with a cane in 10 weeks to decrease AD use.  Target Date: 02/21/23    Goal Identifier: LT goal  Goal Description: Pt will be independent in home strengthening program for self management of Sx in 12 weeks.  Target Date: 03/07/23                                                           Therapy Frequency:  1 time/week  Predicted Duration of Therapy Intervention:  12 weeks    Hayley Dave PT                 I CERTIFY THE NEED FOR THESE SERVICES FURNISHED UNDER        THIS PLAN OF TREATMENT AND WHILE UNDER MY CARE     (Physician co-signature of this document indicates review and certification  of the therapy plan).                       Certification Date From:  12/13/22   Certification Date To:  03/07/23    Referring Provider:  Evelyne Johnson MD    Initial Assessment        See Epic Evaluation Start of Care Date: 12/13/22

## 2022-12-14 ENCOUNTER — OFFICE VISIT (OUTPATIENT)
Dept: ORTHOPEDICS | Facility: CLINIC | Age: 72
End: 2022-12-14
Payer: COMMERCIAL

## 2022-12-14 VITALS
SYSTOLIC BLOOD PRESSURE: 149 MMHG | WEIGHT: 120 LBS | DIASTOLIC BLOOD PRESSURE: 85 MMHG | HEIGHT: 66 IN | BODY MASS INDEX: 19.29 KG/M2 | HEART RATE: 90 BPM

## 2022-12-14 DIAGNOSIS — M11.20 CHONDROCALCINOSIS: ICD-10-CM

## 2022-12-14 DIAGNOSIS — M17.12 ARTHRITIS OF LEFT KNEE: ICD-10-CM

## 2022-12-14 DIAGNOSIS — S82.142A CLOSED FRACTURE OF LEFT TIBIAL PLATEAU, INITIAL ENCOUNTER: Primary | ICD-10-CM

## 2022-12-14 PROCEDURE — 99213 OFFICE O/P EST LOW 20 MIN: CPT | Performed by: PEDIATRICS

## 2022-12-14 NOTE — PATIENT INSTRUCTIONS
We discussed these other possible diagnosis: Minimally displaced tibial plateau fracture, reviewed with orthopedic surgery who recommended non-op treatment.    Plan:  - Today's Plan of Care:  Can wean out of immobilizer, can use sleeve for compression  Start Home Exercise Program - range of motion, quad sets, straight leg raises  Continue to use walker to limit weight bearing  Discussed activity considerations and other supportive care including Ice/Heat, OTC and other topical medications as needed.    -We also discussed other future treatment options:  Referral to Physical Therapy    Follow Up: 3 week with repeat x-rays (2 views of left knee, AP and lateral)    If you have any further questions for your physician or physician s care team you can call [phone] and use option 3 to leave a voice message.

## 2022-12-14 NOTE — LETTER
12/14/2022         RE: Nora Alarcon  [address] [place] [facility] [address]        Dear Colleague,    Thank you for referring your patient, Nora Alarcon, to the [facility] [place]. Please see a copy of my visit note below.    ASSESSMENT & PLAN    Nora was seen today for follow up.    Diagnoses and all orders for this visit:    Closed fracture of left tibial plateau, initial encounter    Arthritis of left knee    Chondrocalcinosis      This issue is acute and Improving.    We discussed these other possible diagnosis: Minimally displaced tibial plateau fracture, reviewed with orthopedic surgery who recommended non-op treatment.  - Discussed most reliable surgical intervention is likely knee replacement, this would depend on chronic pain.    Plan:  - Today's Plan of Care:  Can wean out of immobilizer, can use sleeve for compression  Start Home Exercise Program - range of motion, quad sets, straight leg raises  Continue to use walker to limit weight bearing  Discussed activity considerations and other supportive care including Ice/Heat, OTC and other topical medications as needed.    -We also discussed other future treatment options:  Referral to Physical Therapy    Follow Up: 3 week with repeat x-rays (2 views of left knee, AP and lateral)    Concerning signs and symptoms were reviewed.  The patient expressed understanding of this management plan and all questions were answered at this time.    Evelyne Polanco MD [facility]  Sports Medicine Physician  [facility]      SUBJECTIVE- Interim History December 9, 2022    Chief Complaint   Patient presents with     Left Knee - Follow Up       Nora Alarcon is a 71 year old female who is seen in f/u up for    Closed fracture of left tibial plateau, initial encounter  Arthritis of left knee  Chondrocalcinosis.  Since last visit on 12/7/22  patient has been doing a little better, she has been using the knee  "immobilizer and walker.  - Now ~ 3 weeks from initial onset, here to review CT results    Location of Pain: left anterior aspect of her knee  Worsened by: nothing  Better with: knee immobilizer, walker, oxycodone at night  Treatments tried: rest/activity avoidance, ibuprofen and other medications: Oxycodone/Acetaminophen (Percocet) Use of a walker and knee immoblizer, previous imaging (xray and CT Scan 12/7/22)  Associated symptoms: swelling     Orthopedic/Surgical history: YES - Previous surgery to this knee many years ago, \"ligament repair\".  Social History/Occupation: She is retired     No family history pertinent to patient's problem today.    REVIEW OF SYSTEMS:  Review of Systems  Skin: no bruising, mild swelling  Musculoskeletal: as above  Neurologic: no numbness, paresthesias  Remainder of review of systems is negative including constitutional, CV, pulmonary, GI, except as noted in HPI or medical history.    OBJECTIVE:  BP (!) 149/85   Pulse 90   Ht 1.676 m (5' 6\")   Wt 54.4 kg (120 lb)   BMI 19.37 kg/m       General: healthy, alert and in no distress  HEENT: no scleral icterus or conjunctival erythema  Skin: no suspicious lesions or rash. No jaundice.  CV: distal perfusion intact  Resp: normal respiratory effort without conversational dyspnea   Psych: normal mood and affect  Gait: antalgic  Neuro: Normal light sensory exam of lower extremity     Left Knee exam  Inspection:      mild effusion left       mild swelling left  - well healed scar medial knee     Patella:      Crepitus noted in the patellofemoral joint bilateral     Tender:             lateral joint line left (mild)     Non Tender:      remainder of knee area bilateral     Knee ROM:      Flexion 90 degrees left       Extension 0 degrees left     Hip ROM:     No pain with hip ROM     Strength:      5-/5 with knee extension left     Special Tests:     Difficult due to exam in chair, stable to varus and valgus stress     Gait:      antalgic gait " with walker     Neurovascular:      2+ peripheral pulses bilaterally and brisk capillary refill       sensation grossly intact    RADIOLOGY:  Final results and radiologist's interpretation, available in the [facility] health record.  Images were reviewed with the patient in the office today.  My personal interpretation of the performed imaging:   Reviewed CT scan from 12/7/2022 with minimally depressed fracture of the central aspect of the left tibial plateau with early healing, advanced tricompartmental arthritis and bone demineralization      Review of the result(s) of each unique test - XR and CT               Again, thank you for allowing me to participate in the care of your patient.        Sincerely,        Evelyne Polanco MD

## 2022-12-19 DIAGNOSIS — E78.5 HYPERLIPIDEMIA LDL GOAL <130: ICD-10-CM

## 2022-12-20 RX ORDER — SIMVASTATIN 10 MG
TABLET ORAL
Qty: 90 TABLET | Refills: 0 | Status: SHIPPED | OUTPATIENT
Start: 2022-12-20 | End: 2023-03-15

## 2022-12-20 NOTE — TELEPHONE ENCOUNTER
"Requested Prescriptions   Pending Prescriptions Disp Refills    simvastatin (ZOCOR) 10 MG tablet [Pharmacy Med Name: SIMVASTATIN 10MG TABLET] 90 tablet 3     Sig: TAKE 1 TABLET (10 MG) BY MOUTH AT BEDTIME       Statins Protocol Failed - 12/19/2022  1:02 AM        Failed - LDL on file in past 12 months     Recent Labs   Lab Test 10/26/21  1022   LDL 87             Passed - No abnormal creatine kinase in past 12 months     No lab results found.             Passed - Recent (12 mo) or future (30 days) visit within the authorizing provider's specialty     Patient has had an office visit with the authorizing provider or a provider within the authorizing providers department within the previous 12 mos or has a future within next 30 days. See \"Patient Info\" tab in inbasket, or \"Choose Columns\" in Meds & Orders section of the refill encounter.              Passed - Medication is active on med list        Passed - Patient is age 18 or older        Passed - No active pregnancy on record        Passed - No positive pregnancy test in past 12 months             "

## 2022-12-28 ENCOUNTER — LAB (OUTPATIENT)
Dept: LAB | Facility: CLINIC | Age: 72
End: 2022-12-28
Payer: COMMERCIAL

## 2022-12-28 DIAGNOSIS — E78.5 HYPERLIPIDEMIA LDL GOAL <130: ICD-10-CM

## 2022-12-28 LAB
ANION GAP SERPL CALCULATED.3IONS-SCNC: 7 MMOL/L (ref 7–15)
BUN SERPL-MCNC: 16.5 MG/DL (ref 8–23)
CALCIUM SERPL-MCNC: 9.1 MG/DL (ref 8.8–10.2)
CHLORIDE SERPL-SCNC: 103 MMOL/L (ref 98–107)
CHOLEST SERPL-MCNC: 199 MG/DL
CREAT SERPL-MCNC: 0.75 MG/DL (ref 0.51–0.95)
DEPRECATED HCO3 PLAS-SCNC: 28 MMOL/L (ref 22–29)
GFR SERPL CREATININE-BSD FRML MDRD: 84 ML/MIN/1.73M2
GLUCOSE SERPL-MCNC: 108 MG/DL (ref 70–99)
HDLC SERPL-MCNC: 97 MG/DL
LDLC SERPL CALC-MCNC: 85 MG/DL
NONHDLC SERPL-MCNC: 102 MG/DL
POTASSIUM SERPL-SCNC: 4.4 MMOL/L (ref 3.4–5.3)
SODIUM SERPL-SCNC: 138 MMOL/L (ref 136–145)
TRIGL SERPL-MCNC: 84 MG/DL

## 2022-12-28 PROCEDURE — 80048 BASIC METABOLIC PNL TOTAL CA: CPT

## 2022-12-28 PROCEDURE — 80061 LIPID PANEL: CPT

## 2022-12-28 PROCEDURE — 36415 COLL VENOUS BLD VENIPUNCTURE: CPT

## 2022-12-28 NOTE — RESULT ENCOUNTER NOTE
Cholesterol is similar to last check 1 year ago.  Kidney function and electrolytes are normal.  The blood sugar is very mildly elevated.  She is still due for an annual follow-up with me, I do not see an appointment scheduled

## 2022-12-28 NOTE — LETTER
December 28, 2022      Nora H Dorian  [address]   [place] [facility] [address]        Dear ,    We are writing to inform you of your test results.    Cholesterol is similar to last check 1 year ago.  Kidney function and electrolytes are normal.  The blood sugar is very mildly elevated. You are due for an annual follow-up with Dr. Hill, we do not see an appointment scheduled. Please call and schedule annual wellness visit.       Resulted Orders   Lipid panel reflex to direct LDL Fasting   Result Value Ref Range    Cholesterol 199 <200 mg/dL    Triglycerides 84 <150 mg/dL    Direct Measure HDL 97 >=50 mg/dL    LDL Cholesterol Calculated 85 <=100 mg/dL    Non HDL Cholesterol 102 <130 mg/dL    Narrative    Cholesterol  Desirable:  <200 mg/dL    Triglycerides  Normal:  Less than 150 mg/dL  Borderline High:  150-199 mg/dL  High:  200-499 mg/dL  Very High:  Greater than or equal to 500 mg/dL    Direct Measure HDL  Female:  Greater than or equal to 50 mg/dL   Male:  Greater than or equal to 40 mg/dL    LDL Cholesterol  Desirable:  <100mg/dL  Above Desirable:  100-129 mg/dL   Borderline High:  130-159 mg/dL   High:  160-189 mg/dL   Very High:  >= 190 mg/dL    Non HDL Cholesterol  Desirable:  130 mg/dL  Above Desirable:  130-159 mg/dL  Borderline High:  160-189 mg/dL  High:  190-219 mg/dL  Very High:  Greater than or equal to 220 mg/dL   Basic metabolic panel  (Ca, Cl, CO2, Creat, Gluc, K, Na, BUN)   Result Value Ref Range    Sodium 138 136 - 145 mmol/L    Potassium 4.4 3.4 - 5.3 mmol/L    Chloride 103 98 - 107 mmol/L    Carbon Dioxide (CO2) 28 22 - 29 mmol/L    Anion Gap 7 7 - 15 mmol/L    Urea Nitrogen 16.5 8.0 - 23.0 mg/dL    Creatinine 0.75 0.51 - 0.95 mg/dL    Calcium 9.1 8.8 - 10.2 mg/dL    Glucose 108 (H) 70 - 99 mg/dL    GFR Estimate 84 >60 mL/min/1.73m2      Comment:      Effective December 21, 2021 eGFRcr in adults is calculated using the 2021 CKD-EPI creatinine equation which includes age and  gender (Charisse et al., NEJM, DOI: 10.1056/IOVZoz7069266)       If you have any questions or concerns, please call the clinic at the number listed above.       Sincerely,      Brandie Hill, DO

## 2023-01-13 ENCOUNTER — OFFICE VISIT (OUTPATIENT)
Dept: ORTHOPEDICS | Facility: CLINIC | Age: 73
End: 2023-01-13
Payer: COMMERCIAL

## 2023-01-13 ENCOUNTER — ANCILLARY PROCEDURE (OUTPATIENT)
Dept: GENERAL RADIOLOGY | Facility: CLINIC | Age: 73
End: 2023-01-13
Attending: PEDIATRICS
Payer: COMMERCIAL

## 2023-01-13 VITALS — WEIGHT: 120 LBS | BODY MASS INDEX: 19.37 KG/M2 | SYSTOLIC BLOOD PRESSURE: 168 MMHG | DIASTOLIC BLOOD PRESSURE: 89 MMHG

## 2023-01-13 DIAGNOSIS — M17.12 ARTHRITIS OF LEFT KNEE: ICD-10-CM

## 2023-01-13 DIAGNOSIS — S82.142A CLOSED FRACTURE OF LEFT TIBIAL PLATEAU, INITIAL ENCOUNTER: ICD-10-CM

## 2023-01-13 DIAGNOSIS — M11.20 CHONDROCALCINOSIS: ICD-10-CM

## 2023-01-13 DIAGNOSIS — S82.142A CLOSED FRACTURE OF LEFT TIBIAL PLATEAU, INITIAL ENCOUNTER: Primary | ICD-10-CM

## 2023-01-13 PROCEDURE — 99213 OFFICE O/P EST LOW 20 MIN: CPT | Performed by: PEDIATRICS

## 2023-01-13 PROCEDURE — 73562 X-RAY EXAM OF KNEE 3: CPT | Mod: TC | Performed by: RADIOLOGY

## 2023-01-13 ASSESSMENT — PAIN SCALES - GENERAL: PAINLEVEL: NO PAIN (0)

## 2023-01-13 NOTE — PATIENT INSTRUCTIONS
We discussed these other possible diagnosis: Fracture is healing, symptoms improving    Plan:  - Today's Plan of Care:  Continue brace as needed, continue walking sticks/walker/cane for support  Discussed activity considerations and other supportive care including Ice/Heat, OTC and other topical medications as needed.  Continue Home Exercise Program and start PT  Rehab: Physical Therapy: [facility] - [phone]    Follow Up: 2 months  - Follow up with PCP    If you have any further questions for your physician or physician s care team you can call [phone] and use option 3 to leave a voice message.

## 2023-01-13 NOTE — PROGRESS NOTES
ASSESSMENT & PLAN    Nora was seen today for pain.    Diagnoses and all orders for this visit:    Closed fracture of left tibial plateau, initial encounter  -     XR Knee Left 3 Views; Future  -     Physical Therapy Referral; Future    Arthritis of left knee  -     Physical Therapy Referral; Future    Chondrocalcinosis  -     Physical Therapy Referral; Future      This issue is acute and Improving.    We discussed these other possible diagnosis: Fracture is healing, symptoms improving    Plan:  - Today's Plan of Care:  Continue brace as needed, continue walking sticks/walker/cane for support  Discussed activity considerations and other supportive care including Ice/Heat, OTC and other topical medications as needed.  Continue Home Exercise Program and start PT  Rehab: Physical Therapy: [facility] - [phone]    Follow Up: 2 months  - Follow up with PCP    Concerning signs and symptoms were reviewed.  The patient  expressed understanding of this management plan and all questions were answered at this time.    Evelyne Polanco MD [facility]  Sports Medicine Physician  [facility]      SUBJECTIVE- Interim History January 13, 2023    Chief Complaint   Patient presents with     Left Knee - Pain       Nora Alarcon is a 72 year old female who is seen in f/u up for    Closed fracture of left tibial plateau, initial encounter  Arthritis of left knee  Chondrocalcinosis.  Since last visit on 12/14/2022, patient has been doing great. Using walking sticks and knee brace.  Still mild pain and stiffness at times.  Initial onset 7 weeks ago.    Worsened by: nothing  Better with: bracing and walking sticks  Treatments tried: bracing and walking stick  Associated symptoms:  no distal numbness or tingling; denies swelling or warmth    The patient is seen by themselves.    No family history pertinent to patient's problem today.    REVIEW OF SYSTEMS:  Review of Systems  Skin: no bruising, mild  swelling  Musculoskeletal: as above  Neurologic: no numbness, paresthesias  Remainder of review of systems is negative including constitutional, CV, pulmonary, GI, except as noted in HPI or medical history.    OBJECTIVE:  BP (!) 168/89   Wt 54.4 kg (120 lb)   BMI 19.37 kg/m       GENERAL APPEARANCE: healthy, alert and no distress   GAIT: antalgic  SKIN: no suspicious lesions or rashes  HEENT: Sclera clear, anicteric  CV: no lower extremity edema, good peripheral pulses  RESP: Breathing not labored  NEURO: Normal strength and tone, mentation intact and speech normal  PSYCH:  mentation appears normal and affect normal/bright    Left Knee exam  Inspection:      Well healed scars, mild swelling    Patella:      Crepitus noted in the patellofemoral joint bilateral    Tender:      none    Non Tender:      remainder of knee area bilateral    Knee ROM:      Range of motion limited 10 - 120    Hip ROM:     Full active and passive ROM bilateral    Strength:      5-/5 with knee extension left    Special Tests:     neg (-) anterior drawer left       neg (-) posterior drawer left       neg (-) varus at 0 deg and 30 deg left       neg (-) valgus at 0 deg and 30 deg left    Gait:      antalgic gait       using assisted device    Neurovascular:      2+ peripheral pulses bilaterally and brisk capillary refill       sensation grossly intact    RADIOLOGY:  Final results and radiologist's interpretation, available in the [facility] health record.  Images were reviewed with the patient in the office today.  My personal interpretation of the performed imaging:  3 XR views of left knee reviewed: significant degenerative changes, sclerosis lateral tibial plateua indicating healing, chondrocalcinosis  - will follow official read      Review of the result(s) of each unique test - XR

## 2023-01-13 NOTE — LETTER
1/13/2023         RE: Nora Alarcon  [address]  [place] [address]        Dear Colleague,    Thank you for referring your patient, Nora Alarcon, to the [facility] [place]. Please see a copy of my visit note below.    ASSESSMENT & PLAN    Nora was seen today for pain.    Diagnoses and all orders for this visit:    Closed fracture of left tibial plateau, initial encounter  -     XR Knee Left 3 Views; Future  -     Physical Therapy Referral; Future    Arthritis of left knee  -     Physical Therapy Referral; Future    Chondrocalcinosis  -     Physical Therapy Referral; Future      This issue is acute and Improving.    We discussed these other possible diagnosis: Fracture is healing, symptoms improving    Plan:  - Today's Plan of Care:  Continue brace as needed, continue walking sticks/walker/cane for support  Discussed activity considerations and other supportive care including Ice/Heat, OTC and other topical medications as needed.  Continue Home Exercise Program and start PT  Rehab: Physical Therapy: [facility] - [phone]    Follow Up: 2 months  - Follow up with PCP    Concerning signs and symptoms were reviewed.  The patient  expressed understanding of this management plan and all questions were answered at this time.    Evelyne Polanco MD [facility]  Sports Medicine Physician  [facility]      SUBJECTIVE- Interim History January 13, 2023    Chief Complaint   Patient presents with     Left Knee - Pain       Nora Alarcon is a 72 year old female who is seen in f/u up for    Closed fracture of left tibial plateau, initial encounter  Arthritis of left knee  Chondrocalcinosis.  Since last visit on 12/14/2022, patient has been doing great. Using walking sticks and knee brace.  Still mild pain and stiffness at times.  Initial onset 7 weeks ago.    Worsened by: nothing  Better with: bracing and walking sticks  Treatments tried: bracing  and walking stick  Associated symptoms:  no distal numbness or tingling; denies swelling or warmth    The patient is seen by themselves.    No family history pertinent to patient's problem today.    REVIEW OF SYSTEMS:  Review of Systems  Skin: no bruising, mild swelling  Musculoskeletal: as above  Neurologic: no numbness, paresthesias  Remainder of review of systems is negative including constitutional, CV, pulmonary, GI, except as noted in HPI or medical history.    OBJECTIVE:  BP (!) 168/89   Wt 54.4 kg (120 lb)   BMI 19.37 kg/m       GENERAL APPEARANCE: healthy, alert and no distress   GAIT: antalgic  SKIN: no suspicious lesions or rashes  HEENT: Sclera clear, anicteric  CV: no lower extremity edema, good peripheral pulses  RESP: Breathing not labored  NEURO: Normal strength and tone, mentation intact and speech normal  PSYCH:  mentation appears normal and affect normal/bright    Left Knee exam  Inspection:      Well healed scars, mild swelling    Patella:      Crepitus noted in the patellofemoral joint bilateral    Tender:      none    Non Tender:      remainder of knee area bilateral    Knee ROM:      Range of motion limited 10 - 120    Hip ROM:     Full active and passive ROM bilateral    Strength:      5-/5 with knee extension left    Special Tests:     neg (-) anterior drawer left       neg (-) posterior drawer left       neg (-) varus at 0 deg and 30 deg left       neg (-) valgus at 0 deg and 30 deg left    Gait:      antalgic gait       using assisted device    Neurovascular:      2+ peripheral pulses bilaterally and brisk capillary refill       sensation grossly intact    RADIOLOGY:  Final results and radiologist's interpretation, available in the [facility] health record.  Images were reviewed with the patient in the office today.  My personal interpretation of the performed imaging:  3 XR views of left knee reviewed: significant degenerative changes, sclerosis lateral tibial plateua indicating healing,  chondrocalcinosis  - will follow official read      Review of the result(s) of each unique test - XR               Again, thank you for allowing me to participate in the care of your patient.        Sincerely,        Evelyne Polanco MD

## 2023-01-23 DIAGNOSIS — I10 ESSENTIAL HYPERTENSION WITH GOAL BLOOD PRESSURE LESS THAN 140/90: ICD-10-CM

## 2023-01-26 RX ORDER — LOSARTAN POTASSIUM 25 MG/1
25 TABLET ORAL DAILY
Qty: 90 TABLET | Refills: 0 | Status: SHIPPED | OUTPATIENT
Start: 2023-01-26 | End: 2023-01-26

## 2023-01-26 RX ORDER — LOSARTAN POTASSIUM 25 MG/1
25 TABLET ORAL DAILY
Qty: 90 TABLET | Refills: 3 | Status: SHIPPED | OUTPATIENT
Start: 2023-01-26 | End: 2023-11-18

## 2023-01-26 NOTE — TELEPHONE ENCOUNTER
Nora has BP numbers documented. Writer will call her back shortly to have her share these.    Malgorzata Cummings on 1/26/2023 at 12:30 PM

## 2023-01-26 NOTE — TELEPHONE ENCOUNTER
Patient called back with bp reading.       01/09/2023 --130/68 pulse 85  1/10/2023-- 130/70 pulse 76  1/11/2023--129/70 pulse 76  1/12/23 130/78 pulse 74.  1/14/2023 134/71 pulse 85    01/26/2023-- 159/89 pulse 69 reports left knee pain.       Dr Hill, did you want her to come in for BP check with RN?    [facility]

## 2023-02-08 ENCOUNTER — HOSPITAL ENCOUNTER (OUTPATIENT)
Dept: PHYSICAL THERAPY | Facility: CLINIC | Age: 73
Setting detail: THERAPIES SERIES
Discharge: HOME | End: 2023-02-08
Attending: PEDIATRICS
Payer: COMMERCIAL

## 2023-02-08 PROCEDURE — 97110 THERAPEUTIC EXERCISES: CPT | Mod: GP | Performed by: PHYSICAL THERAPIST

## 2023-02-15 DIAGNOSIS — N39.44 NOCTURNAL ENURESIS: ICD-10-CM

## 2023-02-17 RX ORDER — TOLTERODINE 4 MG/1
CAPSULE, EXTENDED RELEASE ORAL
Qty: 90 CAPSULE | Refills: 0 | Status: SHIPPED | OUTPATIENT
Start: 2023-02-17 | End: 2023-02-22

## 2023-02-20 DIAGNOSIS — N39.44 NOCTURNAL ENURESIS: ICD-10-CM

## 2023-02-22 RX ORDER — TOLTERODINE 4 MG/1
CAPSULE, EXTENDED RELEASE ORAL
Qty: 90 CAPSULE | Refills: 0 | Status: SHIPPED | OUTPATIENT
Start: 2023-02-22 | End: 2023-03-15

## 2023-03-15 ENCOUNTER — OFFICE VISIT (OUTPATIENT)
Dept: FAMILY MEDICINE | Facility: CLINIC | Age: 73
End: 2023-03-15
Payer: COMMERCIAL

## 2023-03-15 VITALS
WEIGHT: 116 LBS | TEMPERATURE: 97.9 F | SYSTOLIC BLOOD PRESSURE: 142 MMHG | OXYGEN SATURATION: 99 % | HEIGHT: 66 IN | HEART RATE: 86 BPM | DIASTOLIC BLOOD PRESSURE: 70 MMHG | BODY MASS INDEX: 18.64 KG/M2 | RESPIRATION RATE: 16 BRPM

## 2023-03-15 DIAGNOSIS — M81.0 AGE-RELATED OSTEOPOROSIS WITHOUT CURRENT PATHOLOGICAL FRACTURE: ICD-10-CM

## 2023-03-15 DIAGNOSIS — F31.0 BIPOLAR AFFECTIVE DISORDER, CURRENT EPISODE HYPOMANIC (H): ICD-10-CM

## 2023-03-15 DIAGNOSIS — I10 ESSENTIAL HYPERTENSION WITH GOAL BLOOD PRESSURE LESS THAN 140/90: ICD-10-CM

## 2023-03-15 DIAGNOSIS — E78.5 HYPERLIPIDEMIA LDL GOAL <130: ICD-10-CM

## 2023-03-15 DIAGNOSIS — N39.44 NOCTURNAL ENURESIS: ICD-10-CM

## 2023-03-15 DIAGNOSIS — Z00.00 ENCOUNTER FOR MEDICARE ANNUAL WELLNESS EXAM: Primary | ICD-10-CM

## 2023-03-15 PROCEDURE — G0439 PPPS, SUBSEQ VISIT: HCPCS | Performed by: INTERNAL MEDICINE

## 2023-03-15 PROCEDURE — 99214 OFFICE O/P EST MOD 30 MIN: CPT | Mod: 25 | Performed by: INTERNAL MEDICINE

## 2023-03-15 RX ORDER — LOSARTAN POTASSIUM 25 MG/1
25 TABLET ORAL DAILY
Qty: 90 TABLET | Refills: 3 | Status: CANCELLED | OUTPATIENT
Start: 2023-03-15

## 2023-03-15 RX ORDER — TOLTERODINE 4 MG/1
4 CAPSULE, EXTENDED RELEASE ORAL DAILY
Qty: 90 CAPSULE | Refills: 3 | Status: SHIPPED | OUTPATIENT
Start: 2023-03-15 | End: 2023-11-18

## 2023-03-15 RX ORDER — SIMVASTATIN 10 MG
10 TABLET ORAL DAILY
Qty: 90 TABLET | Refills: 3 | Status: SHIPPED | OUTPATIENT
Start: 2023-03-15 | End: 2023-11-18

## 2023-03-15 ASSESSMENT — PAIN SCALES - GENERAL: PAINLEVEL: NO PAIN (0)

## 2023-03-15 ASSESSMENT — ACTIVITIES OF DAILY LIVING (ADL): CURRENT_FUNCTION: NO ASSISTANCE NEEDED

## 2023-03-15 NOTE — PROGRESS NOTES
"SUBJECTIVE:   Nora is a 72 year old who presents for Preventive Visit.  Patient has been advised of split billing requirements and indicates understanding: Yes  Are you in the first 12 months of your Medicare coverage?  No    Healthy Habits:    In general, how would you rate your overall health?  Good    Frequency of exercise:  2-3 days/week    Duration of exercise:  Less than 15 minutes    Do you usually eat at least 4 servings of fruit and vegetables a day, include whole grains    & fiber and avoid regularly eating high fat or \"junk\" foods?  Yes    Taking medications regularly:  Yes    Barriers to taking medications:  None    Medication side effects:  Other (dry mouth)    Ability to successfully perform activities of daily living:  No assistance needed    Home Safety:  No safety concerns identified    Hearing Impairment:  No hearing concerns (does having aids )    In the past 6 months, have you been bothered by leaking of urine? Yes (somtimes on medication )    In general, how would you rate your overall mental or emotional health?  Fair      PHQ-2 Total Score:    Additional concerns today:  No  Would like simvastatin, losartan, and detrol renewed for the year. Does not need fills sent in today, has enough at home.     Have you ever done Advance Care Planning? (For example, a Health Directive, POLST, or a discussion with a medical provider or your loved ones about your wishes): No, advance care planning information given to patient to review.  Patient declined advance care planning discussion at this time.       Fall risk  Fallen 2 or more times in the past year?: No  Any fall with injury in the past year?: No    Cognitive Screening   1) Repeat 3 items (Leader, Season, Table)    2) Clock draw: ABNORMAL arrows at wrong area  3) 3 item recall: Recalls 3 objects  Results: ABNORMAL, recalled all 3     --has known bipolar, under psychiatry care  --patient denies any concerns w/her memory    Mini-CogTM Copyright S " Emma. Licensed by the author for use in [facility]; reprinted with permission (jerry@[email or web address]). All rights reserved.      Do you have sleep apnea, excessive snoring or daytime drowsiness?: no    Reviewed and updated as needed this visit by clinical staff   Tobacco  Allergies  Meds  Problems             Reviewed and updated as needed this visit by Provider     Meds  Problems            Social History     Tobacco Use     Smoking status: Never     Smokeless tobacco: Never   Substance Use Topics     Alcohol use: No     Alcohol/week: 0.0 standard drinks         Alcohol Use 3/15/2023   Prescreen: >3 drinks/day or >7 drinks/week? No   No flowsheet data found.        Hyperlipidemia Follow-Up      Are you regularly taking any medication or supplement to lower your cholesterol?   Yes- simvastatin    Are you having muscle aches or other side effects that you think could be caused by your cholesterol lowering medication?  No    Hypertension Follow-up      Do you check your blood pressure regularly outside of the clinic? Yes     Are you following a low salt diet? Yes    Are your blood pressures ever more than 140 on the top number (systolic) OR more than 90 on the bottom number (diastolic), for example 140/90? No     Blood pressure is consistently well controlled at home - reviewed home tracker in Aug.  No changes to medications.  She has white coat hypertension    osteoporosis   --ortho had ordered Forteo, then switched to Prolia 3/2022   --I don't have updated records but she reports she is still getting Prolia  --recent fracture, did physical therapy, reports healed well  --tries to exercise indoors due to icy conditions;  Trying to stay active to help with balance/falls    Mental health:  --follows with Dr. Tipton psychiatry;  --nephew  recently; hard on her    Current providers sharing in care for this patient include:   Patient Care Team:  Brandie Hill DO as PCP - General (Internal  Medicine)  Isabella Clements MD as MD (Hematology & Oncology)  Isabella Clements MD as MD (Hematology & Oncology)  Brandie Chris APRN CNP as Nurse Practitioner (Nurse Practitioner)  Brandie Hill DO as Assigned PCP  Evelyne Polanco MD as Assigned Musculoskeletal Provider    The following health maintenance items are reviewed in Epic and correct as of today:  Health Maintenance   Topic Date Due     ANNUAL REVIEW OF HM ORDERS  Never done     HEPATITIS C SCREENING  Never done     INFLUENZA VACCINE (1) 09/01/2022     LIPID  12/28/2023     MAMMO SCREENING  02/14/2024     MEDICARE ANNUAL WELLNESS VISIT  03/15/2024     FALL RISK ASSESSMENT  03/15/2024     COLORECTAL CANCER SCREENING  10/09/2025     DTAP/TDAP/TD IMMUNIZATION (4 - Td or Tdap) 01/14/2026     ADVANCE CARE PLANNING  03/15/2028     DEXA  03/04/2037     PHQ-2 (once per calendar year)  Completed     Pneumococcal Vaccine: 65+ Years  Completed     ZOSTER IMMUNIZATION  Completed     COVID-19 Vaccine  Completed     IPV IMMUNIZATION  Aged Out     MENINGITIS IMMUNIZATION  Aged Out     Current Outpatient Medications   Medication Sig Dispense Refill     Acetaminophen (TYLENOL PO) Take 1,000 mg by mouth 2 times daily as needed for mild pain or fever        ASPIRIN PO Take 325 mg by mouth daily        denosumab (PROLIA) 60 MG/ML SOSY injection Inject 60 mg Subcutaneous every 6 months Managed by [facility]       Divalproex Sodium (DEPAKOTE PO) Take 250 mg by mouth 2 times daily        loperamide (IMODIUM A-D) 2 MG capsule Take 1 capsule (2 mg) by mouth 2 times daily as needed for diarrhea 15 capsule 0     losartan (COZAAR) 25 MG tablet Take 1 tablet (25 mg) by mouth daily 90 tablet 3     MIRTAZAPINE PO Take 45 mg by mouth At Bedtime       PARoxetine (PAXIL) 10 MG tablet TAKE ONE (1) TABLET BY MOUTH EVERY MORNING       simvastatin (ZOCOR) 10 MG tablet TAKE 1 TABLET (10 MG) BY MOUTH AT BEDTIME 90 tablet 0     tolterodine ER (DETROL LA) 4 MG 24 hr  "capsule TAKE 1 CAPSULE BY MOUTH ONCE DAILY 90 capsule 0             Review of Systems  Constitutional, HEENT, cardiovascular, pulmonary, GI, , musculoskeletal, neuro, skin, endocrine and psych systems are negative, except as otherwise noted.    OBJECTIVE:   BP (!) 142/70 (BP Location: Right arm, Cuff Size: Adult Small)   Pulse 86   Temp 97.9  F (36.6  C) (Tympanic)   Resp 16   Ht 1.676 m (5' 6\")   Wt 52.6 kg (116 lb)   SpO2 99%   BMI 18.72 kg/m   Estimated body mass index is 18.72 kg/m  as calculated from the following:    Height as of this encounter: 1.676 m (5' 6\").    Weight as of this encounter: 52.6 kg (116 lb).  Physical Exam  GENERAL: alert, no distress, frail and elderly  EYES: Eyes grossly normal to inspection, PERRL and conjunctivae and sclerae normal  HENT: ear canals and TM's normal, nose and mouth without ulcers or lesions  NECK: no adenopathy, no asymmetry, masses, or scars and thyroid normal to palpation  RESP: lungs clear to auscultation - no rales, rhonchi or wheezes  CV: regular rate and rhythm, normal S1 S2, no S3 or S4, no murmur, click or rub, no peripheral edema and peripheral pulses strong  ABDOMEN: soft, nontender, no hepatosplenomegaly, no masses and bowel sounds normal  MS: no gross musculoskeletal defects noted, no edema  SKIN: no suspicious lesions or rashes  NEURO: Normal strength and tone, mentation intact and speech normal  PSYCH: mentation appears normal and affect flat        ASSESSMENT / PLAN:   (Z00.00) Encounter for Medicare annual wellness exam  (primary encounter diagnosis)  Comment:   Plan:     (N39.44) Nocturnal enuresis  Comment:  - stable, refill provided.  Dry mouth as side effects, so would not increase dose.  Tolerable dry mouth  Plan: tolterodine ER (DETROL LA) 4 MG 24 hr capsule            (E78.5) Hyperlipidemia LDL goal <130  Comment:  - stable, refill provided  Plan: simvastatin (ZOCOR) 10 MG tablet, OFFICE/OUTPT         VISIT,EST,LEVL IV            (I10) " Essential hypertension with goal blood pressure less than 140/90  Comment: white coat hypertension.  Blood pressure is consistently well controlled at home.  High fall risk, so permissive hypertension in the clinic  Plan: OFFICE/OUTPT VISIT,EST,LEVL IV            (F31.0) Bipolar affective disorder, current episode hypomanic (H)  Comment: follows with psychiatry  Plan:     (M81.0) Age-related osteoporosis without current pathological fracture  Comment: follows with ortho for prolia  Plan:     Patient has been advised of split billing requirements and indicates understanding: Yes      COUNSELING:  Reviewed preventive health counseling, as reflected in patient instructions        She reports that she has never smoked. She has never used smokeless tobacco.      Appropriate preventive services were discussed with this patient, including applicable screening as appropriate for cardiovascular disease, diabetes, osteopenia/osteoporosis, and glaucoma.  As appropriate for age/gender, discussed screening for colorectal cancer, prostate cancer, breast cancer, and cervical cancer. Checklist reviewing preventive services available has been given to the patient.    Reviewed patients plan of care and provided an AVS. The Complex Care Plan (for patients with higher acuity and needing more deliberate coordination of services) for Nora meets the Care Plan requirement. This Care Plan has been established and reviewed with the Patient.          Brandie Hill, [facility]    Identified Health Risks:    Information on urinary incontinence and treatment options given to patient.  The patient was provided with suggestions to help her develop a healthy emotional lifestyle.

## 2023-03-15 NOTE — PATIENT INSTRUCTIONS
Patient Education   Personalized Prevention Plan  You are due for the preventive services outlined below.  Your care team is available to assist you in scheduling these services.  If you have already completed any of these items, please share that information with your care team to update in your medical record.  Health Maintenance Due   Topic Date Due    ANNUAL REVIEW OF HM ORDERS  Never done    Hepatitis C Screening  Never done    Flu Vaccine (1) 09/01/2022       Urinary Incontinence, Female (Adult)   Urinary incontinence means loss of bladder control. This problem affects many women, especially as they get older. If you have incontinence, you may be embarrassed to ask for help. But know that this problem can be treated.   Types of Incontinence  There are different types of incontinence. Two of the main types are described here. You can have more than one type.   Stress incontinence. With this type, urine leaks when pressure (stress) is put on the bladder. This may happen when you cough, sneeze, or laugh. Stress incontinence most often occurs because the pelvic floor muscles that support the bladder and urethra are weak. This can happen after pregnancy and vaginal childbirth or a hysterectomy. It can also be due to excess body weight or hormone changes.  Urge incontinence (also called overactive bladder). With this type, a sudden urge to urinate is felt often. This may happen even though there may not be much urine in the bladder. The need to urinate often during the night is common. Urge incontinence most often occurs because of bladder spasms. This may be due to bladder irritation or infection. Damage to bladder nerves or pelvic muscles, constipation, and certain medicines can also lead to urge incontinence.  Treatment depends on the cause. Further evaluation is needed to find the type you have. This will likely include an exam and certain tests. Based on the results, you and your healthcare provider can then  plan treatment. Until a diagnosis is made, the home care tips below can help ease symptoms.   Home care  Do pelvic floor muscle exercises, if they are prescribed. The pelvic floor muscles help support the bladder and urethra. Many women find that their symptoms improve when doing special exercises that strengthen these muscles. To do the exercises, contract the muscles you would use to stop your stream of urine. But do this when you re not urinating. Hold for 10 seconds, then relax. Repeat 10 to 20 times in a row, at least 3 times a day. Your healthcare provider may give you other instructions for how to do the exercises and how often.  Keep a bladder diary. This helps track how often and how much you urinate over a set period of time. Bring this diary with you to your next visit with the provider. The information can help your provider learn more about your bladder problem.  Lose weight, if advised to by your provider. Extra weight puts pressure on the bladder. Your provider can help you create a weight-loss plan that s right for you. This may include exercising more and making certain diet changes.  Don't have foods and drinks that may irritate the bladder. These can include alcohol and caffeinated drinks.  Quit smoking. Smoking and other tobacco use can lead to a long-term (chronic) cough that strains the pelvic floor muscles. Smoking may also damage the bladder and urethra. Talk with your provider about treatments or methods you can use to quit smoking.  If drinking large amounts of fluid makes you have symptoms, you may be advised to limit your fluid intake. You may also be advised to drink most of your fluids during the day and to limit fluids at night.  If you re worried about urine leakage or accidents, you may wear absorbent pads to catch urine. Change the pads often. This helps reduce discomfort. It may also reduce the risk of skin or bladder infections.    Follow-up care  Follow up with your healthcare  provider, or as directed. It may take some to find the right treatment for your problem. But healthy lifestyle changes can be made right away. These include such things as exercising on a regular basis, eating a healthy diet, losing weight (if needed), and quitting smoking. Your treatment plan may include special therapies or medicines. Certain procedures or surgery may also be options. Talk about any questions you have with your provider.   When to seek medical advice  Call the healthcare provider right away if any of these occur:  Fever of 100.4 F (38 C) or higher, or as directed by your provider  Bladder pain or fullness  Belly swelling  Nausea or vomiting  Back pain  Weakness, dizziness, or fainting  [employer] last reviewed this educational content on 1/1/2020 2000-2021 [employer]. All rights reserved. This information is not intended as a substitute for professional medical care. Always follow your healthcare professional's instructions.        Your Health Risk Assessment indicates you feel you are not in good emotional health.    Recreation   Recreation is not limited to sports and team events. It includes any activity that provides relaxation, interest, enjoyment, and exercise. Recreation provides an outlet for physical, mental, and social energy. It can give a sense of worth and achievement. It can help you stay healthy.    Mental Exercise and Social Involvement  Mental and emotional health is as important as physical health. Keep in touch with friends and family. Stay as active as possible. Continue to learn and challenge yourself.   Things you can do to stay mentally active are:  Learn something new, like a foreign language or musical instrument.   Play SCRABBLE or do crossword puzzles. If you cannot find people to play these games with you at home, you can play them with others on your computer through the Internet.   Join a games club--anything from card games to chess or checkers or  lawn bowling.   Start a new hobby.   Go back to school.   Volunteer.   Read.   Keep up with world events.             Hypertension/ hyperlipidemia  Refills sent; call pharmacy to fill  Blood pressure is always better controlled at home, so no changes to blood pressure meds  No need for aspirin - risk of bleeding is higher than benefit to prevent heart attack and stroke

## 2023-05-01 NOTE — PROGRESS NOTES
[facility]    Outpatient Physical Therapy Discharge Note  Patient: Nora Alarcon  : 1950    Beginning/End Dates of Reporting Period:  22 to 23    Referring Provider: Evelyne Johnson MD    Therapy Diagnosis: L tibial plateau fx     Client Self Report: tibial plateau fx    Objective Measurements:        ROM        Details lacking 28 degrees of ext on the L; lacking 27* of ext on the R       Objective Measure 2   Objective Measure ROM       Details 116*- flexion             Goals:  Goal Identifier ST goal   Goal Description Pt will have 110* of knee flexion in 4 weeks to ambulate without restriction.   Target Date 01/10/23   Date Met   23   Progress (detail required for progress note):       Goal Identifier LT goal   Goal Description Pt will be able to ambulate 500ft with a cane in 10 weeks to decrease AD use.   Target Date 23   Date Met      Progress (detail required for progress note):  In progress     Goal Identifier LT goal   Goal Description Pt will be independent in home strengthening program for self management of Sx in 12 weeks.   Target Date 23   Date Met      Progress (detail required for progress note):  In progress       Plan:  Discharge from therapy.    Discharge:    Reason for Discharge: Patient chooses to discontinue therapy.    Equipment Issued: none    Discharge Plan: Patient to continue home program.

## 2023-05-02 ENCOUNTER — TELEPHONE (OUTPATIENT)
Dept: FAMILY MEDICINE | Facility: CLINIC | Age: 73
End: 2023-05-02
Payer: COMMERCIAL

## 2023-05-02 NOTE — TELEPHONE ENCOUNTER
Patient Quality Outreach    Patient is due for the following:   Hypertension -  BP check    Next Steps:   Schedule a nurse only visit for BP check     Type of outreach:    Sent letter.    Next Steps:  Reach out within 90 days via Letter.    Max number of attempts reached: Yes. Will try again in 90 days if patient still on fail list.    Questions for provider review:    None     Cait Zuniga MA  Chart routed to Care Team.

## 2023-05-17 ENCOUNTER — TELEPHONE (OUTPATIENT)
Dept: FAMILY MEDICINE | Facility: CLINIC | Age: 73
End: 2023-05-17
Payer: COMMERCIAL

## 2023-05-17 NOTE — TELEPHONE ENCOUNTER
Dr Hill,    Please see telephone note.  Pt was sent letter for quality outreach to schedule RN BP check appt.  Pt sends in recent home BP readings:  5/1: 110/65 p78  5/9: 127/64 p72  5/15: 132/74 p89  5/16: 124/66 p78    Note from OV 3/15/23:   Blood pressure is consistently well controlled at home - reviewed home tracker in Aug.  No changes to medications.  She has white coat hypertension   home BP's.     Ernestine Salas RN

## 2023-05-25 ENCOUNTER — TELEPHONE (OUTPATIENT)
Dept: DERMATOLOGY | Facility: CLINIC | Age: 73
End: 2023-05-25
Payer: COMMERCIAL

## 2023-05-25 NOTE — TELEPHONE ENCOUNTER
[facility] Call Center    Phone Message    May a detailed message be left on voicemail: yes     Reason for Call: Symptoms or Concerns     If patient has red-flag symptoms, warm transfer to triage line    Current symptom or concern: Spots on face in same spot where she has had removals done before. Pt is scheduled for 10/16/2023 but wants to ensure it is okay to wait this long to be seen     Symptoms have been present for:  3 month(s)    Has patient previously been seen for this? Yes    By : Dr. Rivas    Date: Same issue in 2018    Are there any new or worsening symptoms? Yes: New spots       Action Taken: Other: Derm    Travel Screening: Not Applicable

## 2023-06-07 ENCOUNTER — OFFICE VISIT (OUTPATIENT)
Dept: DERMATOLOGY | Facility: CLINIC | Age: 73
End: 2023-06-07
Payer: COMMERCIAL

## 2023-06-07 DIAGNOSIS — L81.4 LENTIGO: ICD-10-CM

## 2023-06-07 DIAGNOSIS — D23.9 DERMAL NEVUS: ICD-10-CM

## 2023-06-07 DIAGNOSIS — L57.0 AK (ACTINIC KERATOSIS): ICD-10-CM

## 2023-06-07 DIAGNOSIS — L82.1 SEBORRHEIC KERATOSIS: ICD-10-CM

## 2023-06-07 DIAGNOSIS — Z85.828 HISTORY OF SKIN CANCER: Primary | ICD-10-CM

## 2023-06-07 DIAGNOSIS — D18.01 ANGIOMA OF SKIN: ICD-10-CM

## 2023-06-07 PROCEDURE — 99213 OFFICE O/P EST LOW 20 MIN: CPT | Mod: 25 | Performed by: DERMATOLOGY

## 2023-06-07 PROCEDURE — 17000 DESTRUCT PREMALG LESION: CPT | Performed by: DERMATOLOGY

## 2023-06-07 PROCEDURE — 17003 DESTRUCT PREMALG LES 2-14: CPT | Performed by: DERMATOLOGY

## 2023-06-07 ASSESSMENT — PAIN SCALES - GENERAL: PAINLEVEL: NO PAIN (0)
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
## 2023-07-12 ENCOUNTER — OFFICE VISIT (OUTPATIENT)
Dept: DERMATOLOGY | Facility: CLINIC | Age: 73
End: 2023-07-12
Payer: COMMERCIAL

## 2023-07-12 DIAGNOSIS — Z87.2 HISTORY OF ACTINIC KERATOSES: Primary | ICD-10-CM

## 2023-07-12 PROCEDURE — 99212 OFFICE O/P EST SF 10 MIN: CPT | Performed by: DERMATOLOGY

## 2023-07-12 ASSESSMENT — PAIN SCALES - GENERAL: PAINLEVEL: NO PAIN (0)
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
## 2023-07-25 ENCOUNTER — HOSPITAL ENCOUNTER (OUTPATIENT)
Dept: MAMMOGRAPHY | Facility: CLINIC | Age: 73
Discharge: HOME | End: 2023-07-25
Attending: INTERNAL MEDICINE | Admitting: INTERNAL MEDICINE
Payer: COMMERCIAL

## 2023-07-25 DIAGNOSIS — Z12.31 VISIT FOR SCREENING MAMMOGRAM: ICD-10-CM

## 2023-07-25 PROCEDURE — 77067 SCR MAMMO BI INCL CAD: CPT

## 2023-09-22 ENCOUNTER — IMMUNIZATION (OUTPATIENT)
Dept: FAMILY MEDICINE | Facility: CLINIC | Age: 73
End: 2023-09-22
Payer: COMMERCIAL

## 2023-09-22 DIAGNOSIS — Z23 NEED FOR PROPHYLACTIC VACCINATION AND INOCULATION AGAINST INFLUENZA: Primary | ICD-10-CM

## 2023-09-22 PROCEDURE — 99207 PR NO CHARGE LOS: CPT

## 2023-09-22 PROCEDURE — G0008 ADMIN INFLUENZA VIRUS VAC: HCPCS

## 2023-09-22 PROCEDURE — 90662 IIV NO PRSV INCREASED AG IM: CPT

## 2023-09-26 ENCOUNTER — TELEPHONE (OUTPATIENT)
Dept: FAMILY MEDICINE | Facility: CLINIC | Age: 73
End: 2023-09-26
Payer: COMMERCIAL

## 2023-09-26 NOTE — TELEPHONE ENCOUNTER
Patient Quality Outreach    Patient is due for the following:   Hypertension -  BP check    Next Steps:   Schedule a nurse only visit for BP    Type of outreach:    Sent letter.    Next Steps:  Reach out within 90 days via Letter.    Max number of attempts reached: Yes. Will try again in 90 days if patient still on fail list.    Questions for provider review:    None           Cait Zuniga MA  Chart routed to .

## 2023-10-04 ENCOUNTER — LAB (OUTPATIENT)
Dept: LAB | Facility: CLINIC | Age: 73
End: 2023-10-04
Payer: COMMERCIAL

## 2023-10-04 DIAGNOSIS — F31.74 BIPOLAR 1 DISORDER, MANIC, FULL REMISSION (H): ICD-10-CM

## 2023-10-04 LAB
ALBUMIN SERPL BCG-MCNC: 4.4 G/DL (ref 3.5–5.2)
ALP SERPL-CCNC: 46 U/L (ref 35–104)
ALT SERPL W P-5'-P-CCNC: 15 U/L (ref 0–50)
ANION GAP SERPL CALCULATED.3IONS-SCNC: 11 MMOL/L (ref 7–15)
AST SERPL W P-5'-P-CCNC: 19 U/L (ref 0–45)
BILIRUB SERPL-MCNC: 0.3 MG/DL
BUN SERPL-MCNC: 23.2 MG/DL (ref 8–23)
CALCIUM SERPL-MCNC: 9.8 MG/DL (ref 8.8–10.2)
CHLORIDE SERPL-SCNC: 103 MMOL/L (ref 98–107)
CREAT SERPL-MCNC: 0.89 MG/DL (ref 0.51–0.95)
DEPRECATED HCO3 PLAS-SCNC: 28 MMOL/L (ref 22–29)
EGFRCR SERPLBLD CKD-EPI 2021: 68 ML/MIN/1.73M2
GLUCOSE SERPL-MCNC: 86 MG/DL (ref 70–99)
POTASSIUM SERPL-SCNC: 4.1 MMOL/L (ref 3.4–5.3)
PROT SERPL-MCNC: 7.5 G/DL (ref 6.4–8.3)
SODIUM SERPL-SCNC: 142 MMOL/L (ref 135–145)
VALPROATE SERPL-MCNC: 39.7 UG/ML

## 2023-10-04 PROCEDURE — 80164 ASSAY DIPROPYLACETIC ACD TOT: CPT

## 2023-10-04 PROCEDURE — 36415 COLL VENOUS BLD VENIPUNCTURE: CPT

## 2023-10-04 PROCEDURE — 80053 COMPREHEN METABOLIC PANEL: CPT

## 2023-11-18 DIAGNOSIS — E78.5 HYPERLIPIDEMIA LDL GOAL <130: ICD-10-CM

## 2023-11-18 DIAGNOSIS — N39.44 NOCTURNAL ENURESIS: ICD-10-CM

## 2023-11-18 DIAGNOSIS — I10 ESSENTIAL HYPERTENSION WITH GOAL BLOOD PRESSURE LESS THAN 140/90: ICD-10-CM

## 2023-11-20 ENCOUNTER — TELEPHONE (OUTPATIENT)
Dept: FAMILY MEDICINE | Facility: CLINIC | Age: 73
End: 2023-11-20
Payer: COMMERCIAL

## 2023-11-20 RX ORDER — LOSARTAN POTASSIUM 25 MG/1
25 TABLET ORAL DAILY
Qty: 90 TABLET | Refills: 1 | Status: SHIPPED | OUTPATIENT
Start: 2023-11-20 | End: 2024-06-26

## 2023-11-20 RX ORDER — TOLTERODINE 4 MG/1
4 CAPSULE, EXTENDED RELEASE ORAL DAILY
Qty: 90 CAPSULE | Refills: 1 | Status: SHIPPED | OUTPATIENT
Start: 2023-11-20

## 2023-11-20 RX ORDER — DIVALPROEX SODIUM 250 MG/1
250 TABLET, DELAYED RELEASE ORAL 2 TIMES DAILY
OUTPATIENT
Start: 2023-11-20

## 2023-11-20 RX ORDER — PAROXETINE 10 MG/1
TABLET, FILM COATED ORAL
OUTPATIENT
Start: 2023-11-20

## 2023-11-20 RX ORDER — SIMVASTATIN 10 MG
10 TABLET ORAL DAILY
Qty: 90 TABLET | Refills: 1 | Status: SHIPPED | OUTPATIENT
Start: 2023-11-20 | End: 2024-09-17

## 2023-11-20 NOTE — TELEPHONE ENCOUNTER
"Requested Prescriptions   Pending Prescriptions Disp Refills    losartan (COZAAR) 25 MG tablet 90 tablet 3     Sig: Take 1 tablet (25 mg) by mouth daily       Angiotensin-II Receptors Failed - 11/18/2023  9:13 AM        Failed - Last blood pressure under 140/90 in past 12 months     BP Readings from Last 3 Encounters:   03/15/23 (!) 142/70   01/13/23 (!) 168/89   12/14/22 (!) 149/85                 Passed - Recent (12 mo) or future (30 days) visit within the authorizing provider's specialty     Patient has had an office visit with the authorizing provider or a provider within the authorizing providers department within the previous 12 mos or has a future within next 30 days. See \"Patient Info\" tab in inbasket, or \"Choose Columns\" in Meds & Orders section of the refill encounter.              Passed - Medication is active on med list        Passed - Patient is age 18 or older        Passed - No active pregnancy on record        Passed - Normal serum creatinine on file in past 12 months     Recent Labs   Lab Test 10/04/23  0657   CR 0.89       Ok to refill medication if creatinine is low          Passed - Normal serum potassium on file in past 12 months     Recent Labs   Lab Test 10/04/23  0657   POTASSIUM 4.1                    Passed - No positive pregnancy test in past 12 months          PARoxetine (PAXIL) 10 MG tablet         SSRIs Protocol Passed - 11/18/2023  9:13 AM        Passed - Recent (12 mo) or future (30 days) visit within the authorizing provider's specialty     Patient has had an office visit with the authorizing provider or a provider within the authorizing providers department within the previous 12 mos or has a future within next 30 days. See \"Patient Info\" tab in inbasket, or \"Choose Columns\" in Meds & Orders section of the refill encounter.              Passed - Medication is active on med list        Passed - Patient is age 18 or older        Passed - No active pregnancy on record        Passed - " "No positive pregnancy test in last 12 months          simvastatin (ZOCOR) 10 MG tablet 90 tablet 3     Sig: Take 1 tablet (10 mg) by mouth daily       Statins Protocol Passed - 11/18/2023  9:13 AM        Passed - LDL on file in past 12 months     Recent Labs   Lab Test 12/28/22  0650   LDL 85             Passed - No abnormal creatine kinase in past 12 months     No lab results found.             Passed - Recent (12 mo) or future (30 days) visit within the authorizing provider's specialty     Patient has had an office visit with the authorizing provider or a provider within the authorizing providers department within the previous 12 mos or has a future within next 30 days. See \"Patient Info\" tab in inbasket, or \"Choose Columns\" in Meds & Orders section of the refill encounter.              Passed - Medication is active on med list        Passed - Patient is age 18 or older        Passed - No active pregnancy on record        Passed - No positive pregnancy test in past 12 months          divalproex sodium delayed-release (DEPAKOTE) 250 MG DR tablet       Sig: Take 1 tablet (250 mg) by mouth 2 times daily       Anti-Seizure Meds Protocol  Failed - 11/18/2023  9:13 AM        Failed - Review Authorizing provider's last note.      Refer to last progress notes: confirm request is for original authorizing provider (cannot be through other providers).          Failed - Normal CBC on file in past 26 months     Recent Labs   Lab Test 10/26/21  1112   WBC 3.9*   RBC 3.84   HGB 13.5   HCT 40.8                    Failed - Depakote level within therapeutic range in past 26 months     Lab Results   Component Value Date    VALPROATE 39.7 10/04/2023     Depakote level must be checked 2-4 weeks after dosage change.          Passed - Recent (12 mo) or future (30 days) visit within the authorizing provider's specialty     Patient has had an office visit with the authorizing provider or a provider within the authorizing providers " "department within the previous 12 mos or has a future within next 30 days. See \"Patient Info\" tab in inbasket, or \"Choose Columns\" in Meds & Orders section of the refill encounter.              Passed - Normal ALT or AST on file in past 26 months     Recent Labs   Lab Test 10/04/23  0657   ALT 15     Recent Labs   Lab Test 10/04/23  0657   AST 19             Passed - Normal platelet count on file in past 26 months     Recent Labs   Lab Test 10/26/21  1112                  Passed - Medication is active on med list        Passed - No active pregnancy on record        Passed - No positive pregnancy test in last 12 months          tolterodine ER (DETROL LA) 4 MG 24 hr capsule 90 capsule 3     Sig: Take 1 capsule (4 mg) by mouth daily       Muscarinic Antagonists (Urinary Incontinence Agents) Passed - 11/18/2023  9:13 AM        Passed - Recent (12 mo) or future (30 days) visit within the authorizing provider's specialty     Patient has had an office visit with the authorizing provider or a provider within the authorizing providers department within the previous 12 mos or has a future within next 30 days. See \"Patient Info\" tab in [employer], or \"Choose Columns\" in Meds & Orders section of the refill encounter.              Passed - Patient does not have a diagnosis of glaucoma on the problem list     If glaucoma diagnosis is new, refer refill to physician.          Passed - Medication is active on med list        Passed - Patient is 18 years of age or older             "

## 2023-11-22 NOTE — TELEPHONE ENCOUNTER
"Routing refill request to provider for review/approval because:  Medication is reported/historical    Requested Prescriptions   Pending Prescriptions Disp Refills    PARoxetine (PAXIL) 10 MG tablet         SSRIs Protocol Passed - 11/21/2023  5:06 PM        Passed - Recent (12 mo) or future (30 days) visit within the authorizing provider's specialty     Patient has had an office visit with the authorizing provider or a provider within the authorizing providers department within the previous 12 mos or has a future within next 30 days. See \"Patient Info\" tab in inbasket, or \"Choose Columns\" in Meds & Orders section of the refill encounter.              Passed - Medication is active on med list        Passed - Patient is age 18 or older        Passed - No active pregnancy on record        Passed - No positive pregnancy test in last 12 months                 "

## 2023-11-24 RX ORDER — PAROXETINE 10 MG/1
TABLET, FILM COATED ORAL
OUTPATIENT
Start: 2023-11-24

## 2023-11-24 NOTE — TELEPHONE ENCOUNTER
Duplicate from 11/18, see that encounter.  She has a psychiatrist that manages her mental health medications

## 2023-11-24 NOTE — TELEPHONE ENCOUNTER
HI,   With patient's pharmacy closing and all prescriptions getting transferred to [facility]. [facility] is backed up and can't get us the transfer on this med before patient needs it. Can we Please just get a New Prescriptions sent to us at [facility]. For Paxil 10mg.     Thanks   Annette Schwab  Certified Pharmacy Tech  [facility]  [phone]'

## 2023-11-27 NOTE — TELEPHONE ENCOUNTER
"Routing refill request to provider for review/approval because:  Medication is reported/historical    Requested Prescriptions   Pending Prescriptions Disp Refills    PARoxetine (PAXIL) 10 MG tablet         SSRIs Protocol Passed - 11/25/2023  2:09 PM        Passed - Recent (12 mo) or future (30 days) visit within the authorizing provider's specialty     Patient has had an office visit with the authorizing provider or a provider within the authorizing providers department within the previous 12 mos or has a future within next 30 days. See \"Patient Info\" tab in inbasket, or \"Choose Columns\" in Meds & Orders section of the refill encounter.              Passed - Medication is active on med list        Passed - Patient is age 18 or older        Passed - No active pregnancy on record        Passed - No positive pregnancy test in last 12 months                 "

## 2023-11-28 RX ORDER — PAROXETINE 10 MG/1
TABLET, FILM COATED ORAL
OUTPATIENT
Start: 2023-11-28

## 2023-11-28 NOTE — TELEPHONE ENCOUNTER
I have received multiple refill request for her mental health medications.  I do not manage her mental health medications.

## 2024-01-11 NOTE — ED NOTES
Infectious Diseases Inpatient Progress Note      HISTORY OF PRESENT ILLNESS:  Follow up lumbar discitis and osteomyelitis that was treated with 6 weeks IV antibiotics with last dose being on December 29 who was admitted with 5 falls over 1 week period Since he left skilled nursing facility, with mild leukocytosis lactic acidosis and slight elevation of CRP on admission on IV vancomycin and cefepime, well tolerated.     Current Medications:    amLODIPine, 10 mg, oral, Daily  aspirin, 81 mg, oral, Daily  atorvastatin, 40 mg, oral, Nightly  carvedilol, 6.25 mg, oral, BID  cefepime, 2 g, intravenous, q12h  gabapentin, 100 mg, oral, TID  gemfibrozil, 600 mg, oral, BID AC  insulin lispro, 0-15 Units, subcutaneous, TID with meals  losartan, 50 mg, oral, BID  pantoprazole, 40 mg, oral, Daily   Or  pantoprazole, 40 mg, intravenous, Daily  vancomycin, 1,250 mg, intravenous, q24h        Allergies:  Patient has no known allergies.      Review of Systems  14 system review is negative other than HPI  Patient uses a walker to the bathroom  Patient has a pure wick  Patient reports minimal low back pain that he rates at 1-2 since he received his IV antibiotics.  Physical Exam    Heart Rate:  [76-85]   Temp:  [36.3 °C (97.3 °F)-36.6 °C (97.9 °F)]   Resp:  [16-20]   BP: (117-128)/(59-90)   SpO2:  [93 %-95 %]    Vitals:    01/10/24 1057 01/10/24 1927 01/11/24 0155 01/11/24 0753   BP: 128/60 117/60 127/59 119/90   BP Location: Right arm      Patient Position: Lying   Sitting   Pulse: 76 76 79 85   Resp: 20 16 16 16   Temp: 36.4 °C (97.5 °F) 36.6 °C (97.9 °F) 36.3 °C (97.3 °F)    TempSrc: Temporal   Temporal   SpO2: 94% 95% 93% 95%   Weight:       Height:         General Appearance: alert and oriented to person, place and time, well-developed and well-nourished, in no acute distress  Skin: warm and dry, no rash.   Head: normocephalic and atraumatic  Eyes: anicteric sclerae  ENT:  normal mucous membranes. No oral thrush  Lungs: normal  Pt was seen for fall last week, states she hasn't fallen since then, not taking blood thinners. States back pain was not evaluated during visit.    respiratory effort, clear lungs  Heart normal S1-S2 no murmur  Abdomen: soft, no tenderness  No leg edema  No erythema, no tenderness  Positive mild tenderness over the L3-L4 spine  DATA:    Lab Results   Component Value Date    WBC 8.8 01/11/2024    HGB 11.5 (L) 01/11/2024    HCT 34.2 (L) 01/11/2024    MCV 88 01/11/2024     (H) 01/11/2024     Lab Results   Component Value Date    CREATININE 0.93 01/11/2024    BUN 25 (H) 01/11/2024     01/11/2024    K 3.7 01/11/2024    CL 95 (L) 01/11/2024    CO2 28 01/11/2024       Hepatic Function Panel:  Lab Results   Component Value Date    ALKPHOS 56 01/09/2024    ALT 11 01/09/2024    AST 13 01/09/2024    PROT 6.7 01/09/2024    BILITOT 0.4 01/09/2024         Imaging:   MR lumbar spine w and wo IV contrast    Result Date: 1/9/2024  Interpreted By:  Fede Holcomb, STUDY: MR CERVICAL SPINE W AND WO IV CONTRAST; MR THORACIC SPINE W AND WO IV CONTRAST; MR LUMBAR SPINE W AND WO IV CONTRAST;  1/9/2024 10:06 pm   INDICATION: Signs/Symptoms:recent spinal infection worsening back pain leg weakness.   COMPARISON: CT cervical and thoracic and lumbar spine dated 01/09/2024.  MR lumbar spine dated 09/05/2023.   ACCESSION NUMBER(S): [id number]; [id number]; [id number]   ORDERING CLINICIAN: GERSON BRYANT   TECHNIQUE: Pre and post contrast multiplanar and multisequence images were acquired through the cervical spine, thoracic spine, and lumbar spine. Post contrast images were obtained after administration of 16 mL of Dotarem intravenous contrast.   FINDINGS:   Cervical spine:   Alignment: There is mild degenerative C3 over C4 retrolisthesis and C5 over C6.   Vertebrae/Intervertebral Discs: The vertebral body heights are maintained. There is mildly heterogeneous bone marrow signal without evidence of focal suspicious osseous lesions. There is diffuse degenerative disc space narrowing from C3 through C7.   There are no epidural collections.   Cord: Normal spinal cord signal  and contour.   C1-C2: The cervicomedullary junction appears unremarkable. There is no central canal stenosis.   C2-C3: Small disc bulge, contributing to partial effacement of the thecal sac. No foraminal stenosis.   C3-C4: Central disc osteophyte complex, mild retrolisthesis, with posterolateral osseous spurring results in complete effacement of the thecal sac with moderate central canal stenosis and moderate bilateral foraminal stenosis.   C4-C5: Mild central disc osteophyte complex and ligamentum flavum infolding contributes to mild central canal stenosis and mild bilateral foraminal stenosis.   C5-C6: There is a central disc osteophyte complex algorithm flavum infolding resulting in moderate central canal stenosis with almost complete effacement of the thecal sac. There is severe left and moderate right foraminal stenosis due to posterolateral osseous spurring.   C6-C7: Small disc osteophyte complex. No significant central canal stenosis. There is moderate bilateral foraminal stenosis due to posterolateral osseous spurring.   C7-T1: There is no significant disc bulge or herniation. There is no significant central canal or neural foraminal stenosis.   Thoracic spine: Normal thoracic kyphosis. The spinal cord is normal in signal and contour. Vertebral body heights  are maintained. There is multifocal fatty bone marrow change in the upper and midthoracic spine without focal bone marrow lesions. There is also multilevel degenerative disc space narrowing in the upper and midthoracic spine.   There are small central disc osteophyte complexes at T2-T3, T3-T4, T6-T7 contributing to mild central canal stenosis at these levels. There is multilevel degenerative disc space narrowing in the midthoracic spine. There is no evidence of high-grade canal stenosis, spinal cord signal or contour abnormality in the thoracic spine. No evidence of abnormal enhancement within the spinal canal.   Lumbar spine: There is straightening of  lumbar lordosis. Minimal degenerative anterolisthesis of L4 over L5, and degenerative retrolisthesis of L2 over L3 similar to prior. There is interval development of extensive bone marrow edema and intra discal edema at L3-L4 since September 2022. There is an enhancing rounded Schmorl node within the central inferior endplate of L3 extending into the disc space. There is focal enhancement and edema of the left L3 transverse process, axial image 22 of 46 consistent with a nondisplaced fracture.   There is focal T1 hypointense enhancing bone marrow lesion within the right iliac bone unchanged from prior, axial image 40 of 46, which is nonspecific and may represent an atypical hemangioma, axial image 40 of 46.   Previously seen T1 hypointense T2 hyperintense nodule within the right T12-L1 neural foramen is unchanged in size and demonstrates diffuse enhancement, measuring 1.1 x 0.7 cm, likely representing a nerve root schwannoma.   T12-L2: No significant disc bulge or herniation. No significant central canal or foraminal stenosis. Note there is an enhancing homogeneous nodule within the right T12-L1 neural foramen consistent with a schwannoma. L2-L3: Mild retrolisthesis of L3 over L4 and facet hypertrophy without central canal stenosis. There is mild-to-moderate bilateral foraminal stenosis due to osseous spurring similar to prior. L3-L4: There is ventral epidural enhancement posterior to the L3 and L4 vertebral bodies which may represent reactive venous epidural plexus versus phlegmon. There is also enhancing disc material in the ventral epidural space measuring approximately 1.0 cm craniocaudally and point 6 cm in anterior-posterior dimension, sagittal image 10 of 19. This contributes to mild left subarticular stenosis without significant central canal stenosis. There is moderate to severe lbilateral foraminal stenosis due to progressing disc space narrowing with disc bulge and facet hypertrophy. There is increasing  left foraminal disc protrusion. There is mild enhancement of the bilateral facets with small right-sided facet effusion, unchanged from September 2023, likely degenerative. L4-L5: There is similar moderate right greater than left foraminal stenosis due to facet hypertrophy and disc bulge. There is enhancing right-sided facet effusion, likely degenerative similar to prior. L5-S1: No significant disc bulge or herniation. No significant canal stenosis.     Other:         Cervical spine: No evidence of focal suspicious bone marrow lesions or discitis osteomyelitis in the cervical spine. Multilevel uncal and synovial facet arthropathy in the cervical spine with resultant moderate central canal stenosis at C3-C4 and C5-C6. There is severe left foraminal stenosis at C6-C7 due to posterolateral osseous spurring.   Thoracic spine: Endplate degenerative change throughout the upper and midthoracic spine without high-grade canal stenosis, bone marrow edema, or spinal cord abnormality.   Lumbar spine: Discitis osteomyelitis at L3-L4. There is posterior central enhancing soft tissue at the level of L3-L4 consistent with phlegmonous change, without evidence of significant central canal stenosis at this level or epidural abscess. There is an enhancing Schmorl node within the inferior endplate of L3 with extension into the disc space. There is right greater than left paravertebral psoas muscle edema at the level of L3-L4 related to the discitis osteomyelitis, without evidence of a fluid collection. There is interval progression of bilateral foraminal stenosis at L3-L4 due to increased disc space narrowing with diffuse disc bulge and facet hypertrophy.   Unchanged moderate degenerative bilateral foraminal stenosis at L4-L5. No evidence of significant central canal stenosis.   Previously seen enhancing soft tissue within the right T12-L1 neural foramen is unchanged in size and demonstrates homogeneous enhancement consistent with a  schwannoma. There is otherwise no abnormal leptomeningeal enhancement. Nonspecific unchanged in size enhancing bone marrow lesion within the right posterior ilium which may possibly represent an atypical hemangioma. Attention on follow-up is recommended.   Nondisplaced subacute fracture of the left L3 transverse process.   Signed by: Fede Holcomb 1/9/2024 11:34 PM Dictation workstation:   [id number]    MR thoracic spine w and wo IV contrast    Result Date: 1/9/2024  Interpreted By:  Fede Holcomb, STUDY: MR CERVICAL SPINE W AND WO IV CONTRAST; MR THORACIC SPINE W AND WO IV CONTRAST; MR LUMBAR SPINE W AND WO IV CONTRAST;  1/9/2024 10:06 pm   INDICATION: Signs/Symptoms:recent spinal infection worsening back pain leg weakness.   COMPARISON: CT cervical and thoracic and lumbar spine dated 01/09/2024.  MR lumbar spine dated 09/05/2023.   ACCESSION NUMBER(S): [id number]; [id number]; [id number]   ORDERING CLINICIAN: GERSON BRYANT   TECHNIQUE: Pre and post contrast multiplanar and multisequence images were acquired through the cervical spine, thoracic spine, and lumbar spine. Post contrast images were obtained after administration of 16 mL of Dotarem intravenous contrast.   FINDINGS:   Cervical spine:   Alignment: There is mild degenerative C3 over C4 retrolisthesis and C5 over C6.   Vertebrae/Intervertebral Discs: The vertebral body heights are maintained. There is mildly heterogeneous bone marrow signal without evidence of focal suspicious osseous lesions. There is diffuse degenerative disc space narrowing from C3 through C7.   There are no epidural collections.   Cord: Normal spinal cord signal and contour.   C1-C2: The cervicomedullary junction appears unremarkable. There is no central canal stenosis.   C2-C3: Small disc bulge, contributing to partial effacement of the thecal sac. No foraminal stenosis.   C3-C4: Central disc osteophyte complex, mild retrolisthesis, with posterolateral osseous spurring  results in complete effacement of the thecal sac with moderate central canal stenosis and moderate bilateral foraminal stenosis.   C4-C5: Mild central disc osteophyte complex and ligamentum flavum infolding contributes to mild central canal stenosis and mild bilateral foraminal stenosis.   C5-C6: There is a central disc osteophyte complex algorithm flavum infolding resulting in moderate central canal stenosis with almost complete effacement of the thecal sac. There is severe left and moderate right foraminal stenosis due to posterolateral osseous spurring.   C6-C7: Small disc osteophyte complex. No significant central canal stenosis. There is moderate bilateral foraminal stenosis due to posterolateral osseous spurring.   C7-T1: There is no significant disc bulge or herniation. There is no significant central canal or neural foraminal stenosis.   Thoracic spine: Normal thoracic kyphosis. The spinal cord is normal in signal and contour. Vertebral body heights  are maintained. There is multifocal fatty bone marrow change in the upper and midthoracic spine without focal bone marrow lesions. There is also multilevel degenerative disc space narrowing in the upper and midthoracic spine.   There are small central disc osteophyte complexes at T2-T3, T3-T4, T6-T7 contributing to mild central canal stenosis at these levels. There is multilevel degenerative disc space narrowing in the midthoracic spine. There is no evidence of high-grade canal stenosis, spinal cord signal or contour abnormality in the thoracic spine. No evidence of abnormal enhancement within the spinal canal.   Lumbar spine: There is straightening of lumbar lordosis. Minimal degenerative anterolisthesis of L4 over L5, and degenerative retrolisthesis of L2 over L3 similar to prior. There is interval development of extensive bone marrow edema and intra discal edema at L3-L4 since September 2022. There is an enhancing rounded Schmorl node within the central  inferior endplate of L3 extending into the disc space. There is focal enhancement and edema of the left L3 transverse process, axial image 22 of 46 consistent with a nondisplaced fracture.   There is focal T1 hypointense enhancing bone marrow lesion within the right iliac bone unchanged from prior, axial image 40 of 46, which is nonspecific and may represent an atypical hemangioma, axial image 40 of 46.   Previously seen T1 hypointense T2 hyperintense nodule within the right T12-L1 neural foramen is unchanged in size and demonstrates diffuse enhancement, measuring 1.1 x 0.7 cm, likely representing a nerve root schwannoma.   T12-L2: No significant disc bulge or herniation. No significant central canal or foraminal stenosis. Note there is an enhancing homogeneous nodule within the right T12-L1 neural foramen consistent with a schwannoma. L2-L3: Mild retrolisthesis of L3 over L4 and facet hypertrophy without central canal stenosis. There is mild-to-moderate bilateral foraminal stenosis due to osseous spurring similar to prior. L3-L4: There is ventral epidural enhancement posterior to the L3 and L4 vertebral bodies which may represent reactive venous epidural plexus versus phlegmon. There is also enhancing disc material in the ventral epidural space measuring approximately 1.0 cm craniocaudally and point 6 cm in anterior-posterior dimension, sagittal image 10 of 19. This contributes to mild left subarticular stenosis without significant central canal stenosis. There is moderate to severe lbilateral foraminal stenosis due to progressing disc space narrowing with disc bulge and facet hypertrophy. There is increasing left foraminal disc protrusion. There is mild enhancement of the bilateral facets with small right-sided facet effusion, unchanged from September 2023, likely degenerative. L4-L5: There is similar moderate right greater than left foraminal stenosis due to facet hypertrophy and disc bulge. There is enhancing  right-sided facet effusion, likely degenerative similar to prior. L5-S1: No significant disc bulge or herniation. No significant canal stenosis.     Other:         Cervical spine: No evidence of focal suspicious bone marrow lesions or discitis osteomyelitis in the cervical spine. Multilevel uncal and synovial facet arthropathy in the cervical spine with resultant moderate central canal stenosis at C3-C4 and C5-C6. There is severe left foraminal stenosis at C6-C7 due to posterolateral osseous spurring.   Thoracic spine: Endplate degenerative change throughout the upper and midthoracic spine without high-grade canal stenosis, bone marrow edema, or spinal cord abnormality.   Lumbar spine: Discitis osteomyelitis at L3-L4. There is posterior central enhancing soft tissue at the level of L3-L4 consistent with phlegmonous change, without evidence of significant central canal stenosis at this level or epidural abscess. There is an enhancing Schmorl node within the inferior endplate of L3 with extension into the disc space. There is right greater than left paravertebral psoas muscle edema at the level of L3-L4 related to the discitis osteomyelitis, without evidence of a fluid collection. There is interval progression of bilateral foraminal stenosis at L3-L4 due to increased disc space narrowing with diffuse disc bulge and facet hypertrophy.   Unchanged moderate degenerative bilateral foraminal stenosis at L4-L5. No evidence of significant central canal stenosis.   Previously seen enhancing soft tissue within the right T12-L1 neural foramen is unchanged in size and demonstrates homogeneous enhancement consistent with a schwannoma. There is otherwise no abnormal leptomeningeal enhancement. Nonspecific unchanged in size enhancing bone marrow lesion within the right posterior ilium which may possibly represent an atypical hemangioma. Attention on follow-up is recommended.   Nondisplaced subacute fracture of the left L3  transverse process.   Signed by: Fede Holcomb 1/9/2024 11:34 PM Dictation workstation:   [id number]    MR cervical spine w and wo IV contrast    Result Date: 1/9/2024  Interpreted By:  Fede Holcomb, STUDY: MR CERVICAL SPINE W AND WO IV CONTRAST; MR THORACIC SPINE W AND WO IV CONTRAST; MR LUMBAR SPINE W AND WO IV CONTRAST;  1/9/2024 10:06 pm   INDICATION: Signs/Symptoms:recent spinal infection worsening back pain leg weakness.   COMPARISON: CT cervical and thoracic and lumbar spine dated 01/09/2024.  MR lumbar spine dated 09/05/2023.   ACCESSION NUMBER(S): [id number]; [id number]; [id number]   ORDERING CLINICIAN: GERSON BRYANT   TECHNIQUE: Pre and post contrast multiplanar and multisequence images were acquired through the cervical spine, thoracic spine, and lumbar spine. Post contrast images were obtained after administration of 16 mL of Dotarem intravenous contrast.   FINDINGS:   Cervical spine:   Alignment: There is mild degenerative C3 over C4 retrolisthesis and C5 over C6.   Vertebrae/Intervertebral Discs: The vertebral body heights are maintained. There is mildly heterogeneous bone marrow signal without evidence of focal suspicious osseous lesions. There is diffuse degenerative disc space narrowing from C3 through C7.   There are no epidural collections.   Cord: Normal spinal cord signal and contour.   C1-C2: The cervicomedullary junction appears unremarkable. There is no central canal stenosis.   C2-C3: Small disc bulge, contributing to partial effacement of the thecal sac. No foraminal stenosis.   C3-C4: Central disc osteophyte complex, mild retrolisthesis, with posterolateral osseous spurring results in complete effacement of the thecal sac with moderate central canal stenosis and moderate bilateral foraminal stenosis.   C4-C5: Mild central disc osteophyte complex and ligamentum flavum infolding contributes to mild central canal stenosis and mild bilateral foraminal stenosis.   C5-C6: There is a  central disc osteophyte complex algorithm flavum infolding resulting in moderate central canal stenosis with almost complete effacement of the thecal sac. There is severe left and moderate right foraminal stenosis due to posterolateral osseous spurring.   C6-C7: Small disc osteophyte complex. No significant central canal stenosis. There is moderate bilateral foraminal stenosis due to posterolateral osseous spurring.   C7-T1: There is no significant disc bulge or herniation. There is no significant central canal or neural foraminal stenosis.   Thoracic spine: Normal thoracic kyphosis. The spinal cord is normal in signal and contour. Vertebral body heights  are maintained. There is multifocal fatty bone marrow change in the upper and midthoracic spine without focal bone marrow lesions. There is also multilevel degenerative disc space narrowing in the upper and midthoracic spine.   There are small central disc osteophyte complexes at T2-T3, T3-T4, T6-T7 contributing to mild central canal stenosis at these levels. There is multilevel degenerative disc space narrowing in the midthoracic spine. There is no evidence of high-grade canal stenosis, spinal cord signal or contour abnormality in the thoracic spine. No evidence of abnormal enhancement within the spinal canal.   Lumbar spine: There is straightening of lumbar lordosis. Minimal degenerative anterolisthesis of L4 over L5, and degenerative retrolisthesis of L2 over L3 similar to prior. There is interval development of extensive bone marrow edema and intra discal edema at L3-L4 since September 2022. There is an enhancing rounded Schmorl node within the central inferior endplate of L3 extending into the disc space. There is focal enhancement and edema of the left L3 transverse process, axial image 22 of 46 consistent with a nondisplaced fracture.   There is focal T1 hypointense enhancing bone marrow lesion within the right iliac bone unchanged from prior, axial image 40  of 46, which is nonspecific and may represent an atypical hemangioma, axial image 40 of 46.   Previously seen T1 hypointense T2 hyperintense nodule within the right T12-L1 neural foramen is unchanged in size and demonstrates diffuse enhancement, measuring 1.1 x 0.7 cm, likely representing a nerve root schwannoma.   T12-L2: No significant disc bulge or herniation. No significant central canal or foraminal stenosis. Note there is an enhancing homogeneous nodule within the right T12-L1 neural foramen consistent with a schwannoma. L2-L3: Mild retrolisthesis of L3 over L4 and facet hypertrophy without central canal stenosis. There is mild-to-moderate bilateral foraminal stenosis due to osseous spurring similar to prior. L3-L4: There is ventral epidural enhancement posterior to the L3 and L4 vertebral bodies which may represent reactive venous epidural plexus versus phlegmon. There is also enhancing disc material in the ventral epidural space measuring approximately 1.0 cm craniocaudally and point 6 cm in anterior-posterior dimension, sagittal image 10 of 19. This contributes to mild left subarticular stenosis without significant central canal stenosis. There is moderate to severe lbilateral foraminal stenosis due to progressing disc space narrowing with disc bulge and facet hypertrophy. There is increasing left foraminal disc protrusion. There is mild enhancement of the bilateral facets with small right-sided facet effusion, unchanged from September 2023, likely degenerative. L4-L5: There is similar moderate right greater than left foraminal stenosis due to facet hypertrophy and disc bulge. There is enhancing right-sided facet effusion, likely degenerative similar to prior. L5-S1: No significant disc bulge or herniation. No significant canal stenosis.     Other:         Cervical spine: No evidence of focal suspicious bone marrow lesions or discitis osteomyelitis in the cervical spine. Multilevel uncal and synovial facet  arthropathy in the cervical spine with resultant moderate central canal stenosis at C3-C4 and C5-C6. There is severe left foraminal stenosis at C6-C7 due to posterolateral osseous spurring.   Thoracic spine: Endplate degenerative change throughout the upper and midthoracic spine without high-grade canal stenosis, bone marrow edema, or spinal cord abnormality.   Lumbar spine: Discitis osteomyelitis at L3-L4. There is posterior central enhancing soft tissue at the level of L3-L4 consistent with phlegmonous change, without evidence of significant central canal stenosis at this level or epidural abscess. There is an enhancing Schmorl node within the inferior endplate of L3 with extension into the disc space. There is right greater than left paravertebral psoas muscle edema at the level of L3-L4 related to the discitis osteomyelitis, without evidence of a fluid collection. There is interval progression of bilateral foraminal stenosis at L3-L4 due to increased disc space narrowing with diffuse disc bulge and facet hypertrophy.   Unchanged moderate degenerative bilateral foraminal stenosis at L4-L5. No evidence of significant central canal stenosis.   Previously seen enhancing soft tissue within the right T12-L1 neural foramen is unchanged in size and demonstrates homogeneous enhancement consistent with a schwannoma. There is otherwise no abnormal leptomeningeal enhancement. Nonspecific unchanged in size enhancing bone marrow lesion within the right posterior ilium which may possibly represent an atypical hemangioma. Attention on follow-up is recommended.   Nondisplaced subacute fracture of the left L3 transverse process.   Signed by: Fede Holcomb 1/9/2024 11:34 PM Dictation workstation:   [id number]    CT lumbar spine wo IV contrast    Result Date: 1/9/2024  Interpreted By:  Salud Fisher, STUDY: CT CERVICAL SPINE WO IV CONTRAST; CT LUMBAR SPINE WO IV CONTRAST; CT THORACIC SPINE WO IV CONTRAST;  1/9/2024 6:47 pm;  1/9/2024 6:50 pm   INDICATION: Signs/Symptoms:frequent falls; Signs/Symptoms:low back pain leg weakness, frequent falls; Signs/Symptoms:frequent falls, pain.   COMPARISON: MRI lumbar spine 09/05/2023, CT pelvis 10/07/2023   ACCESSION NUMBER(S): [id number]; [id number]; [id number]   ORDERING CLINICIAN: GERSON BRYANT   TECHNIQUE: Axial noncontrast images of the cervical, thoracic and lumbar spine with coronal and sagittal reconstructed images.   FINDINGS: CERVICAL SPINE: ALIGNMENT: Minimal retrolisthesis of C3 on C4, C4 on C5 and C5 on C6. Facet joint and craniocervical line maintained. VERTEBRAE: Chronic endplates changes and subchondral cysts. Vertebral body heights are grossly maintained with demineralization. SPINAL CANAL: Degenerative changes facet and uncinate arthropathy, as well as disc space narrowing and end plate hypertrophy. C3-C4 with mild canal and foraminal narrowing. C5-C6 with moderate foraminal narrowing. C6-C7 with moderate foraminal narrowing. No critical canal stenosis. PREVERTEBRAL SOFT TISSUES: No prevertebral soft tissue swelling. LUNG APICES: Imaged portion of the lung apices are within normal limits.   THORACIC SPINE: ALIGNMENT: Dextroscoliosis of the midthoracic spine. Facet joint alignment maintained. VERTEBRAE: Demineralization without acute compression deformity or fracture identified. SPINAL CANAL: Multilevel degenerative changes including intervertebral disc space narrowing and endplate hypertrophy. No critical spinal canal stenosis. PREVERTEBRAL SOFT TISSUES: No prevertebral soft tissue swelling.   Significant aortic and coronary artery calcifications identified. Probable atelectasis.   LUMBAR SPINE: ALIGNMENT: Minimal retrolisthesis of L1 on L2, L2 on L3 and L3 on L4. Minimal anterolisthesis of L4 on L5. Levoscoliosis centered about L2-L3. VERTEBRAE: Interval progression of endplate lucencies, lytic lesions and sclerosis along the inferior L3 vertebral body and superior L4  vertebral body without significant widening of the disc space. Findings are progressed since CT pelvis and possibly new since prior MRI vertebral body heights are otherwise grossly maintained no acute displaced fracture identified. SPINAL CANAL: Significant degenerative changes are again noted at L2-L3 with loss of intervertebral disc space and endplate sclerosis, greater on the right. Multilevel facet hypertrophy. L3 L4 disc bulge and posterior ligamentous hypertrophy with mild-to-moderate canal narrowing and at least moderate lateral recess stenosis as well as moderate to severe foraminal stenosis. Severe foraminal stenosis at L2-L3. PREVERTEBRAL SOFT TISSUES: No prevertebral soft tissue swelling.   OTHER FINDINGS: None.       Interval progression of lucent/lytic/erosive changes involving the inferior L3 and superior L4 vertebral bodies without significant disc space narrowing. Findings are progressed from prior CT pelvis and appear new from prior MRI given differences in technique. Changes are indeterminate and could represent discitis/osteomyelitis, malignancy or less likely degenerative changes. Clinical correlation and follow-up MRI with contrast may be considered.   Otherwise no acute fracture or dislocation. No critical canal stenosis identified.   Degenerative changes most significant at L2-L3 and up to moderate canal narrowing suspected at L3-L4.   Mild scoliosis.   MACRO: None.   Signed by: Salud Fisher 1/9/2024 7:44 PM Dictation workstation:   [id number]    CT thoracic spine wo IV contrast    Result Date: 1/9/2024  Interpreted By:  Salud Fisher, STUDY: CT CERVICAL SPINE WO IV CONTRAST; CT LUMBAR SPINE WO IV CONTRAST; CT THORACIC SPINE WO IV CONTRAST;  1/9/2024 6:47 pm; 1/9/2024 6:50 pm   INDICATION: Signs/Symptoms:frequent falls; Signs/Symptoms:low back pain leg weakness, frequent falls; Signs/Symptoms:frequent falls, pain.   COMPARISON: MRI lumbar spine 09/05/2023, CT pelvis 10/07/2023    ACCESSION NUMBER(S): [id number]; [id number]; [id number]   ORDERING CLINICIAN: GERSON BRYANT   TECHNIQUE: Axial noncontrast images of the cervical, thoracic and lumbar spine with coronal and sagittal reconstructed images.   FINDINGS: CERVICAL SPINE: ALIGNMENT: Minimal retrolisthesis of C3 on C4, C4 on C5 and C5 on C6. Facet joint and craniocervical line maintained. VERTEBRAE: Chronic endplates changes and subchondral cysts. Vertebral body heights are grossly maintained with demineralization. SPINAL CANAL: Degenerative changes facet and uncinate arthropathy, as well as disc space narrowing and end plate hypertrophy. C3-C4 with mild canal and foraminal narrowing. C5-C6 with moderate foraminal narrowing. C6-C7 with moderate foraminal narrowing. No critical canal stenosis. PREVERTEBRAL SOFT TISSUES: No prevertebral soft tissue swelling. LUNG APICES: Imaged portion of the lung apices are within normal limits.   THORACIC SPINE: ALIGNMENT: Dextroscoliosis of the midthoracic spine. Facet joint alignment maintained. VERTEBRAE: Demineralization without acute compression deformity or fracture identified. SPINAL CANAL: Multilevel degenerative changes including intervertebral disc space narrowing and endplate hypertrophy. No critical spinal canal stenosis. PREVERTEBRAL SOFT TISSUES: No prevertebral soft tissue swelling.   Significant aortic and coronary artery calcifications identified. Probable atelectasis.   LUMBAR SPINE: ALIGNMENT: Minimal retrolisthesis of L1 on L2, L2 on L3 and L3 on L4. Minimal anterolisthesis of L4 on L5. Levoscoliosis centered about L2-L3. VERTEBRAE: Interval progression of endplate lucencies, lytic lesions and sclerosis along the inferior L3 vertebral body and superior L4 vertebral body without significant widening of the disc space. Findings are progressed since CT pelvis and possibly new since prior MRI vertebral body heights are otherwise grossly maintained no acute displaced fracture  identified. SPINAL CANAL: Significant degenerative changes are again noted at L2-L3 with loss of intervertebral disc space and endplate sclerosis, greater on the right. Multilevel facet hypertrophy. L3 L4 disc bulge and posterior ligamentous hypertrophy with mild-to-moderate canal narrowing and at least moderate lateral recess stenosis as well as moderate to severe foraminal stenosis. Severe foraminal stenosis at L2-L3. PREVERTEBRAL SOFT TISSUES: No prevertebral soft tissue swelling.   OTHER FINDINGS: None.       Interval progression of lucent/lytic/erosive changes involving the inferior L3 and superior L4 vertebral bodies without significant disc space narrowing. Findings are progressed from prior CT pelvis and appear new from prior MRI given differences in technique. Changes are indeterminate and could represent discitis/osteomyelitis, malignancy or less likely degenerative changes. Clinical correlation and follow-up MRI with contrast may be considered.   Otherwise no acute fracture or dislocation. No critical canal stenosis identified.   Degenerative changes most significant at L2-L3 and up to moderate canal narrowing suspected at L3-L4.   Mild scoliosis.   MACRO: None.   Signed by: Salud Fisher 1/9/2024 7:44 PM Dictation workstation:   [id number]    CT cervical spine wo IV contrast    Result Date: 1/9/2024  Interpreted By:  Salud Fisher, STUDY: CT CERVICAL SPINE WO IV CONTRAST; CT LUMBAR SPINE WO IV CONTRAST; CT THORACIC SPINE WO IV CONTRAST;  1/9/2024 6:47 pm; 1/9/2024 6:50 pm   INDICATION: Signs/Symptoms:frequent falls; Signs/Symptoms:low back pain leg weakness, frequent falls; Signs/Symptoms:frequent falls, pain.   COMPARISON: MRI lumbar spine 09/05/2023, CT pelvis 10/07/2023   ACCESSION NUMBER(S): [id number]; [id number]; [id number]   ORDERING CLINICIAN: GERSON BRYANT   TECHNIQUE: Axial noncontrast images of the cervical, thoracic and lumbar spine with coronal and sagittal reconstructed images.    FINDINGS: CERVICAL SPINE: ALIGNMENT: Minimal retrolisthesis of C3 on C4, C4 on C5 and C5 on C6. Facet joint and craniocervical line maintained. VERTEBRAE: Chronic endplates changes and subchondral cysts. Vertebral body heights are grossly maintained with demineralization. SPINAL CANAL: Degenerative changes facet and uncinate arthropathy, as well as disc space narrowing and end plate hypertrophy. C3-C4 with mild canal and foraminal narrowing. C5-C6 with moderate foraminal narrowing. C6-C7 with moderate foraminal narrowing. No critical canal stenosis. PREVERTEBRAL SOFT TISSUES: No prevertebral soft tissue swelling. LUNG APICES: Imaged portion of the lung apices are within normal limits.   THORACIC SPINE: ALIGNMENT: Dextroscoliosis of the midthoracic spine. Facet joint alignment maintained. VERTEBRAE: Demineralization without acute compression deformity or fracture identified. SPINAL CANAL: Multilevel degenerative changes including intervertebral disc space narrowing and endplate hypertrophy. No critical spinal canal stenosis. PREVERTEBRAL SOFT TISSUES: No prevertebral soft tissue swelling.   Significant aortic and coronary artery calcifications identified. Probable atelectasis.   LUMBAR SPINE: ALIGNMENT: Minimal retrolisthesis of L1 on L2, L2 on L3 and L3 on L4. Minimal anterolisthesis of L4 on L5. Levoscoliosis centered about L2-L3. VERTEBRAE: Interval progression of endplate lucencies, lytic lesions and sclerosis along the inferior L3 vertebral body and superior L4 vertebral body without significant widening of the disc space. Findings are progressed since CT pelvis and possibly new since prior MRI vertebral body heights are otherwise grossly maintained no acute displaced fracture identified. SPINAL CANAL: Significant degenerative changes are again noted at L2-L3 with loss of intervertebral disc space and endplate sclerosis, greater on the right. Multilevel facet hypertrophy. L3 L4 disc bulge and posterior  ligamentous hypertrophy with mild-to-moderate canal narrowing and at least moderate lateral recess stenosis as well as moderate to severe foraminal stenosis. Severe foraminal stenosis at L2-L3. PREVERTEBRAL SOFT TISSUES: No prevertebral soft tissue swelling.   OTHER FINDINGS: None.       Interval progression of lucent/lytic/erosive changes involving the inferior L3 and superior L4 vertebral bodies without significant disc space narrowing. Findings are progressed from prior CT pelvis and appear new from prior MRI given differences in technique. Changes are indeterminate and could represent discitis/osteomyelitis, malignancy or less likely degenerative changes. Clinical correlation and follow-up MRI with contrast may be considered.   Otherwise no acute fracture or dislocation. No critical canal stenosis identified.   Degenerative changes most significant at L2-L3 and up to moderate canal narrowing suspected at L3-L4.   Mild scoliosis.   MACRO: None.   Signed by: Salud Fisher 1/9/2024 7:44 PM Dictation workstation:   [id number]    CT head wo IV contrast    Result Date: 1/9/2024  Interpreted By:  Salud Fisher, STUDY: CT HEAD WO IV CONTRAST;  1/9/2024 6:47 pm   INDICATION: Signs/Symptoms:dizziness, frequent falls, head injury.   COMPARISON: 06/04/2016   ACCESSION NUMBER(S): [id number]   ORDERING CLINICIAN: GERSON BRYANT   TECHNIQUE: Axial noncontrast CT images of the head.   FINDINGS: BRAIN PARENCHYMA: Gray-white matter interfaces are preserved. No mass effect or midline shift. Generalized parenchymal volume loss noted with concordant ventricular enlargement. Non-specific white matter changes noted, which may be related to small vessel disease.   HEMORRHAGE: No acute intracranial hemorrhage. VENTRICLES and EXTRA-AXIAL SPACES: The ventricles, sulci and basal cisterns enlarged, concordant with parenchymal volume loss. EXTRACRANIAL SOFT TISSUES: Within normal limits. PARANASAL SINUSES/MASTOIDS: The visualized  paranasal sinuses and mastoid air cells are aerated. CALVARIUM: No depressed skull fracture. No destructive osseous lesion.   OTHER FINDINGS: None.       No acute intracranial hemorrhage or mass effect.     MACRO: None.   Signed by: Salud Fisher 1/9/2024 7:23 PM Dictation workstation:   [id number]    XR pelvis 1-2 views    Result Date: 1/9/2024  Interpreted By:  Salud Fisher, STUDY: XR PELVIS 1-2 VIEWS; ;  1/9/2024 6:34 pm   INDICATION: Signs/Symptoms:fall.   COMPARISON: None.   ACCESSION NUMBER(S): [id number]   ORDERING CLINICIAN: GERSON BRYANT   FINDINGS: AP pelvis   Degenerative changes of the bilateral hip joints. Scattered surgical clips in the medial upper thighs. Vascular calcifications noted.   No acute fracture, diastasis or dislocation identified.       No acute osseous abnormality identified.     MACRO: None   Signed by: Salud Fisher 1/9/2024 7:19 PM Dictation workstation:   [id number]    XR chest 1 view    Result Date: 1/9/2024  Interpreted By:  Salud Fisher, STUDY: XR CHEST 1 VIEW;  1/9/2024 6:34 pm   INDICATION: Signs/Symptoms:weakness.   COMPARISON: 01/21/2023   ACCESSION NUMBER(S): [id number]   ORDERING CLINICIAN: GERSON BRYANT   FINDINGS: AP radiograph of the chest was provided.       CARDIOMEDIASTINAL SILHOUETTE: Cardiomediastinal silhouette is normal in size and configuration. Atherosclerotic calcifications of the aortic arch.   LUNGS: No focal consolidation, pleural effusion or sizable pneumothorax seen.   ABDOMEN: No remarkable upper abdominal findings.   BONES: No acute osseous changes.       1.  No focal consolidation or sizeable pleural effusion.       MACRO: None   Signed by: Salud Fisher 1/9/2024 7:12 PM Dictation workstation:   [id number]         Component  Ref Range & Units 05:40   CRP, High Sensitivity  <1.0 mg/L 60.2 High    Resulting Agency [facility]                       IMPRESSION:    Lumbar discitis and osteomyelitis with questionable epidural phlegmon, status  post 6 weeks of IV antibiotics done on December 29, negative culture on aspiration.  Highly suspect recurrent/persistent infection  Severe cervical spine stenosis  Frequent falls  CRP elevation    Patient Active Problem List   Diagnosis    Intractable low back pain    Closed fracture of transverse process of lumbar vertebra with nonunion    Dizziness    Near syncope    Generalized weakness    Discitis of lumbar region    Osteomyelitis of lumbar spine (CMS/[facility])    Type 2 diabetes mellitus with retinopathy, with long-term current use of insulin (CMS/[facility])       PLAN:  Continue IV vancomycin and cefepime for 6 weeks  [facility]  Recommend neurosurgery consult post transfer.  May need repeat biopsy with bacterial and fungal and AFB stains and cultures  Will probably need repeat MRI in 4 weeks    Discussed with patient    Mary Justin MD

## 2024-01-24 ENCOUNTER — IMMUNIZATION (OUTPATIENT)
Dept: FAMILY MEDICINE | Facility: CLINIC | Age: 74
End: 2024-01-24
Payer: COMMERCIAL

## 2024-01-24 DIAGNOSIS — Z23 ENCOUNTER FOR IMMUNIZATION: Primary | ICD-10-CM

## 2024-01-24 PROCEDURE — 91320 SARSCV2 VAC 30MCG TRS-SUC IM: CPT

## 2024-01-24 PROCEDURE — 90480 ADMN SARSCOV2 VAC 1/ONLY CMP: CPT

## 2024-01-24 PROCEDURE — 99207 PR NO CHARGE NURSE ONLY: CPT

## 2024-01-24 NOTE — PROGRESS NOTES
Prior to immunization administration, verified patients identity using patient s name and date of birth. Please see Immunization Activity for additional information.     Screening Questionnaire for Adult Immunization    Are you sick today?   No   Do you have allergies to medications, food, a vaccine component or latex?   No   Have you ever had a serious reaction after receiving a vaccination?   No   Do you have a long-term health problem with heart, lung, kidney, or metabolic disease (e.g., diabetes), asthma, a blood disorder, no spleen, complement component deficiency, a cochlear implant, or a spinal fluid leak?  Are you on long-term aspirin therapy?   No   Do you have cancer, leukemia, HIV/AIDS, or any other immune system problem?   No   Do you have a parent, brother, or sister with an immune system problem?   No   In the past 3 months, have you taken medications that affect  your immune system, such as prednisone, other steroids, or anticancer drugs; drugs for the treatment of rheumatoid arthritis, Crohn s disease, or psoriasis; or have you had radiation treatments?   No   Have you had a seizure, or a brain or other nervous system problem?   No   During the past year, have you received a transfusion of blood or blood    products, or been given immune (gamma) globulin or antiviral drug?   No   For women: Are you pregnant or is there a chance you could become       pregnant during the next month?   No   Have you received any vaccinations in the past 4 weeks?   No     Immunization questionnaire answers were all negative.    I have reviewed the following standing orders:   This patient is due and qualifies for the Covid-19 vaccine.     Click here for COVID-19 Standing Order    I have reviewed the vaccines inclusion and exclusion criteria; No concerns regarding eligibility.     Patient instructed to remain in clinic for 15 minutes afterwards, and to report any adverse reactions.     Screening performed by Kalyn VARGAS  SYLVESTER Lei on 1/24/2024 at 7:18 AM.

## 2024-01-30 ENCOUNTER — TELEPHONE (OUTPATIENT)
Dept: FAMILY MEDICINE | Facility: CLINIC | Age: 74
End: 2024-01-30
Payer: COMMERCIAL

## 2024-02-06 ENCOUNTER — TELEPHONE (OUTPATIENT)
Dept: FAMILY MEDICINE | Facility: CLINIC | Age: 74
End: 2024-02-06
Payer: COMMERCIAL

## 2024-02-06 DIAGNOSIS — R60.0 BILATERAL LEG EDEMA: Primary | ICD-10-CM

## 2024-02-06 NOTE — TELEPHONE ENCOUNTER
Order/Referral Request    Who is requesting: Pt    Orders being requested: New LILIBETH socks      When are orders needed by: asap    Has this been discussed with Provider: Yes    Does patient have a preference on a Group/Provider/Facility? [facility]    Where to send orders: Fax [phone]    Okay to leave a detailed message?: Yes at Cell number on file:    Telephone Information:   Mobile [phone]     .Danay Bonner PSC

## 2024-02-06 NOTE — TELEPHONE ENCOUNTER
Patient reports she was given stockings through [employer] store. Chart review - found this past order. DME order pended     Order Questions    Question Answer Comment   Medical Equipment (DME) Supplier: [employer]    Type: Compression Stockings    Compression Stocking Type: Knee High    Compression Stocking Strength: 15/20 mmHg    Compression Stocking Quantity: 2 Pair tan and black         Ama Magana RN

## 2024-02-06 NOTE — TELEPHONE ENCOUNTER
Need more information.  Knee-high or thigh-high?  What grade compression? I am not able to find this information in the chart

## 2024-02-14 ENCOUNTER — PATIENT OUTREACH (OUTPATIENT)
Dept: CARE COORDINATION | Facility: CLINIC | Age: 74
End: 2024-02-14
Payer: COMMERCIAL

## 2024-02-28 ENCOUNTER — PATIENT OUTREACH (OUTPATIENT)
Dept: CARE COORDINATION | Facility: CLINIC | Age: 74
End: 2024-02-28
Payer: COMMERCIAL

## 2024-04-27 ENCOUNTER — HOSPITAL ENCOUNTER (EMERGENCY)
Facility: CLINIC | Age: 74
Discharge: HOME | End: 2024-04-27
Attending: FAMILY MEDICINE | Admitting: FAMILY MEDICINE
Payer: COMMERCIAL

## 2024-04-27 ENCOUNTER — APPOINTMENT (OUTPATIENT)
Dept: GENERAL RADIOLOGY | Facility: CLINIC | Age: 74
End: 2024-04-27
Attending: FAMILY MEDICINE
Payer: COMMERCIAL

## 2024-04-27 VITALS
RESPIRATION RATE: 16 BRPM | TEMPERATURE: 97.6 F | SYSTOLIC BLOOD PRESSURE: 184 MMHG | HEIGHT: 67 IN | BODY MASS INDEX: 21.19 KG/M2 | OXYGEN SATURATION: 99 % | DIASTOLIC BLOOD PRESSURE: 78 MMHG | WEIGHT: 135 LBS | HEART RATE: 77 BPM

## 2024-04-27 DIAGNOSIS — S22.41XA CLOSED FRACTURE OF MULTIPLE RIBS OF RIGHT SIDE, INITIAL ENCOUNTER: ICD-10-CM

## 2024-04-27 PROCEDURE — 99284 EMERGENCY DEPT VISIT MOD MDM: CPT | Performed by: FAMILY MEDICINE

## 2024-04-27 PROCEDURE — 71101 X-RAY EXAM UNILAT RIBS/CHEST: CPT | Mod: RT

## 2024-04-27 PROCEDURE — 99283 EMERGENCY DEPT VISIT LOW MDM: CPT | Performed by: FAMILY MEDICINE

## 2024-04-27 RX ORDER — CETIRIZINE HYDROCHLORIDE 10 MG/1
10 TABLET ORAL ONCE
Status: DISCONTINUED | OUTPATIENT
Start: 2024-04-27 | End: 2024-04-27

## 2024-04-27 RX ORDER — OXYCODONE HYDROCHLORIDE 5 MG/1
5-10 TABLET ORAL EVERY 8 HOURS PRN
Qty: 10 TABLET | Refills: 0 | Status: SHIPPED | OUTPATIENT
Start: 2024-04-27

## 2024-04-27 ASSESSMENT — COLUMBIA-SUICIDE SEVERITY RATING SCALE - C-SSRS
6. HAVE YOU EVER DONE ANYTHING, STARTED TO DO ANYTHING, OR PREPARED TO DO ANYTHING TO END YOUR LIFE?: NO
1. IN THE PAST MONTH, HAVE YOU WISHED YOU WERE DEAD OR WISHED YOU COULD GO TO SLEEP AND NOT WAKE UP?: NO
2. HAVE YOU ACTUALLY HAD ANY THOUGHTS OF KILLING YOURSELF IN THE PAST MONTH?: NO

## 2024-04-28 NOTE — ED NOTES
DIscussed use of oxycodone and incentive spirometer given. Encouraged use. Pt d/c instructions reviewed and received. There are no unanswered questions at the time of discharge. Pt escorted to lobby for discharge.
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
## 2024-04-28 NOTE — DISCHARGE INSTRUCTIONS
RETURN TO THE EMERGENCY ROOM FOR THE FOLLOWING:    Severely worsened pain, worsened breathing, fever greater than 101, or at anytime for any concern.    FOLLOW UP:    With your primary clinic in 1 to 2 weeks for reevaluation.  A referral order was placed at the time of discharge, expect a phone call over the next few days to help with scheduling.    TREATMENT RECOMMENDATIONS:    Ibuprofen (10 mg/kg per dose, maximum 600 mg) alternating with acetaminophen (15 mg/kg per dose, maximum 1000 mg) every four hours as needed for fever and/or pain.  Therefore, you can take ibuprofen and then 4 hours later take acetaminophen and then 4 hours later take ibuprofen, etc.  You should not use these medications for more than five days with this dosing schedule.    Oxycodone 1-2 tabs every eight hours as needed for pain control that is not relieved by the above.    Consider MiraLAX to help promote a regular stool pattern.  The goal is to have a soft, easy stool either every day or every other day.  You can titrate MiraLAX to achieve this goal.  MiraLAX is a nonsoluble fiber and is not addictive.  It will not cause bowel dysfunction or pathology.      NURSE ADVICE LINE:  [phone] or [phone]

## 2024-04-28 NOTE — ED NOTES
Pt back from triage and imaging, requesting to stay in wheelchair per comfort. Alert and oriented. NAD. Call light within reach.

## 2024-05-21 ENCOUNTER — TELEPHONE (OUTPATIENT)
Facility: CLINIC | Age: 74
End: 2024-05-21

## 2024-06-25 ENCOUNTER — TELEPHONE (OUTPATIENT)
Dept: FAMILY MEDICINE | Facility: CLINIC | Age: 74
End: 2024-06-25
Payer: COMMERCIAL

## 2024-06-25 NOTE — TELEPHONE ENCOUNTER
Patient Quality Outreach    Patient is due for the following:   Hypertension -  BP check    Next Steps:   Patient has upcoming appointment, these items will be addressed at that time.    Type of outreach:    Chart review performed, no outreach needed.    Next Steps:  Reach out within 90 days via Letter.    Max number of attempts reached: Yes. Will try again in 90 days if patient still on fail list.    Questions for provider review:    None           Cait Zuniga MA  Chart routed to .

## 2024-06-26 ENCOUNTER — ALLIED HEALTH/NURSE VISIT (OUTPATIENT)
Dept: FAMILY MEDICINE | Facility: CLINIC | Age: 74
End: 2024-06-26
Payer: COMMERCIAL

## 2024-06-26 ENCOUNTER — TELEPHONE (OUTPATIENT)
Dept: FAMILY MEDICINE | Facility: CLINIC | Age: 74
End: 2024-06-26

## 2024-06-26 ENCOUNTER — LAB (OUTPATIENT)
Dept: LAB | Facility: CLINIC | Age: 74
End: 2024-06-26
Payer: COMMERCIAL

## 2024-06-26 VITALS — HEART RATE: 84 BPM | DIASTOLIC BLOOD PRESSURE: 84 MMHG | SYSTOLIC BLOOD PRESSURE: 164 MMHG

## 2024-06-26 DIAGNOSIS — F31.74 BIPOLAR 1 DISORDER, MANIC, FULL REMISSION (H): ICD-10-CM

## 2024-06-26 DIAGNOSIS — I10 ESSENTIAL HYPERTENSION WITH GOAL BLOOD PRESSURE LESS THAN 140/90: Primary | ICD-10-CM

## 2024-06-26 DIAGNOSIS — I10 ESSENTIAL HYPERTENSION WITH GOAL BLOOD PRESSURE LESS THAN 140/90: ICD-10-CM

## 2024-06-26 DIAGNOSIS — F31.74 BIPOLAR 1 DISORDER, MANIC, FULL REMISSION (H): Primary | ICD-10-CM

## 2024-06-26 LAB
ANION GAP SERPL CALCULATED.3IONS-SCNC: 13 MMOL/L (ref 7–15)
BUN SERPL-MCNC: 19.9 MG/DL (ref 8–23)
CALCIUM SERPL-MCNC: 9.6 MG/DL (ref 8.8–10.2)
CHLORIDE SERPL-SCNC: 105 MMOL/L (ref 98–107)
CREAT SERPL-MCNC: 0.92 MG/DL (ref 0.51–0.95)
DEPRECATED HCO3 PLAS-SCNC: 24 MMOL/L (ref 22–29)
EGFRCR SERPLBLD CKD-EPI 2021: 65 ML/MIN/1.73M2
GLUCOSE SERPL-MCNC: 96 MG/DL (ref 70–99)
POTASSIUM SERPL-SCNC: 4.5 MMOL/L (ref 3.4–5.3)
SODIUM SERPL-SCNC: 142 MMOL/L (ref 135–145)
VALPROATE SERPL-MCNC: 29.4 UG/ML

## 2024-06-26 PROCEDURE — 99207 PR NO CHARGE NURSE ONLY: CPT

## 2024-06-26 PROCEDURE — 80048 BASIC METABOLIC PNL TOTAL CA: CPT

## 2024-06-26 PROCEDURE — 80164 ASSAY DIPROPYLACETIC ACD TOT: CPT

## 2024-06-26 PROCEDURE — 36415 COLL VENOUS BLD VENIPUNCTURE: CPT

## 2024-06-26 RX ORDER — LOSARTAN POTASSIUM 25 MG/1
25 TABLET ORAL DAILY
Qty: 90 TABLET | Refills: 0 | Status: SHIPPED | OUTPATIENT
Start: 2024-06-26

## 2024-06-26 NOTE — TELEPHONE ENCOUNTER
Agree with appointment.  Refill losartan until appointment.  Recommend RN BP check in 2 weeks as suggested by RN

## 2024-06-26 NOTE — TELEPHONE ENCOUNTER
"Nora Alarcon is a 73 year old patient who comes in today for a Blood Pressure check because of ongoing blood pressure monitoring.   Patient is following-up to panel management, as she hasn't been in for over a year.  Last BP in ER was 184/78.  Patient had a mechanical fall and fractured ribs.  She is recovered at this time with no further falls.     Vital Signs as repeated by RN today:  BP initially 176/86, then 164/84 on recheck 10 minutes later. Pulse 84.   Patient is not taking medication as prescribed.  She is prescribed losartan, but has not taken this for the past 2 weeks due to running out.    Patient was tolerating medication well when taking it.   Patient is monitoring Blood Pressure at home.  Average readings if yes are usually 120's/80's, but sometimes will get \"aramis high\" around 180's/90's, only on occasion, when she feels very stressed.  She reports her sister has cancer and has decided against further treatment, so she has been struggling with this.  She does have a therapist/psychiatrist that she sees, and reports taking her MH medication as directed.     Current complaints: none. Denies dizziness, etc., and the fall she had back in April was mechanical, not due to lightheadedness, etc.   Disposition:  RN did assist patient with scheduling annual visit with PCP for next month.  Forwarding to PCP in the meantime for advice on BP medication.  Should patient just resume losartan for now and recheck in 2 weeks?   She will need a refill and prefers our pharmacy here.     Amelia Mandujano RN  [facility]    "

## 2024-06-26 NOTE — PROGRESS NOTES
"Nora Alarcon is a 73 year old patient who comes in today for a Blood Pressure check because of ongoing blood pressure monitoring.   Patient is following-up to panel management, as she hasn't been in for over a year.  Last BP in ER was 184/78.  Patient had a mechanical fall and fractured ribs.  She is recovered at this time with no further falls.     Vital Signs as repeated by RN today:  BP initially 176/86, then 164/84 on recheck 10 minutes later. Pulse 84.   Patient is not taking medication as prescribed.  She is prescribed losartan, but has not taken this for the past 2 weeks due to running out.    Patient was tolerating medication well when taking it.   Patient is monitoring Blood Pressure at home.  Average readings if yes are usually 120's/80's, but sometimes will get \"aramis high\" around 180's/90's, only on occasion, when she feels very stressed.  She reports her sister has cancer and has decided against further treatment, so she has been struggling with this.  She does have a therapist/psychiatrist that she sees, and reports taking her MH medication as directed.     Current complaints: none. Denies dizziness, etc., and the fall she had back in April was mechanical, not due to lightheadedness, etc.   Disposition:  RN did assist patient with scheduling annual visit with PCP for next month.  Forwarding to PCP in the meantime for advice on BP medication.  Should patient just resume losartan for now and recheck in 2 weeks?   She will need a refill and prefers our pharmacy here.     Amelia Mandujano RN  [facility]    "

## 2024-06-27 NOTE — TELEPHONE ENCOUNTER
Patient was called. Provider's response was reviewed with her. She has clinic appointment scheduled on 7/24 with Dr. Hill. She will schedule 2 week RN BP check appointment at her convenience.    Laury Hood RN

## 2024-07-24 ENCOUNTER — TELEPHONE (OUTPATIENT)
Dept: FAMILY MEDICINE | Facility: CLINIC | Age: 74
End: 2024-07-24

## 2024-07-24 NOTE — TELEPHONE ENCOUNTER
Called patient offer appts with another provider and early morning visits sooner. Patient declined scheduling. Reports its only for wellness check, report nothing else concerning.       [facility]

## 2024-07-24 NOTE — TELEPHONE ENCOUNTER
Reason for Call:  Appointment Request    Patient requesting this type of appt:  Preventive     Requested provider: Brandie Hill    Reason patient unable to be scheduled:  pt was not aware of appt today and needs to get a new Annual scheduled     When does patient want to be seen/preferred time:  as soon as possible    Comments: pt can't get an appt until oct but would like to get in sooner if possible    Okay to leave a detailed message?: Yes at Cell number on file:    Telephone Information:   Mobile [phone]       Call taken on 7/24/2024 at 10:37 AM by Hayley Spaulding

## 2024-09-15 DIAGNOSIS — E78.5 HYPERLIPIDEMIA LDL GOAL <130: ICD-10-CM

## 2024-09-17 RX ORDER — SIMVASTATIN 10 MG
10 TABLET ORAL DAILY
Qty: 90 TABLET | Refills: 0 | Status: SHIPPED | OUTPATIENT
Start: 2024-09-17

## 2024-10-09 ENCOUNTER — TELEPHONE (OUTPATIENT)
Dept: FAMILY MEDICINE | Facility: CLINIC | Age: 74
End: 2024-10-09

## 2024-10-09 ENCOUNTER — IMMUNIZATION (OUTPATIENT)
Dept: FAMILY MEDICINE | Facility: CLINIC | Age: 74
End: 2024-10-09
Payer: COMMERCIAL

## 2024-10-09 ENCOUNTER — ALLIED HEALTH/NURSE VISIT (OUTPATIENT)
Dept: FAMILY MEDICINE | Facility: CLINIC | Age: 74
End: 2024-10-09
Payer: COMMERCIAL

## 2024-10-09 VITALS — SYSTOLIC BLOOD PRESSURE: 146 MMHG | DIASTOLIC BLOOD PRESSURE: 78 MMHG | HEART RATE: 86 BPM | OXYGEN SATURATION: 95 %

## 2024-10-09 DIAGNOSIS — I10 ESSENTIAL HYPERTENSION WITH GOAL BLOOD PRESSURE LESS THAN 140/90: Primary | ICD-10-CM

## 2024-10-09 DIAGNOSIS — I10 ESSENTIAL HYPERTENSION WITH GOAL BLOOD PRESSURE LESS THAN 140/90: ICD-10-CM

## 2024-10-09 PROCEDURE — 90662 IIV NO PRSV INCREASED AG IM: CPT

## 2024-10-09 PROCEDURE — 91320 SARSCV2 VAC 30MCG TRS-SUC IM: CPT

## 2024-10-09 PROCEDURE — G0008 ADMIN INFLUENZA VIRUS VAC: HCPCS

## 2024-10-09 PROCEDURE — 99207 PR NO CHARGE NURSE ONLY: CPT

## 2024-10-09 PROCEDURE — 90480 ADMN SARSCOV2 VAC 1/ONLY CMP: CPT

## 2024-10-09 RX ORDER — LOSARTAN POTASSIUM 25 MG/1
25 TABLET ORAL DAILY
Qty: 90 TABLET | Refills: 0 | Status: SHIPPED | OUTPATIENT
Start: 2024-10-09

## 2024-10-09 RX ORDER — LOSARTAN POTASSIUM 25 MG/1
25 TABLET ORAL DAILY
Qty: 90 TABLET | Refills: 0 | Status: CANCELLED | OUTPATIENT
Start: 2024-10-09

## 2024-10-09 NOTE — TELEPHONE ENCOUNTER
Nora Alarcon is a 73 year old year old patient who comes in today for a Blood Pressure check because of ongoing blood pressure monitoring. Stopped taking losartan when she ran out. Has been 2 weeks since she has taken     Vital Signs as repeated by /84 p 86 146/78     Patient is not taking medication as prescribed    Patient is tolerating medications well when she is taking them  Patient is not monitoring Blood Pressure at home.     Current complaints: none     Disposition:  await provider response        MANDO Ruvalcaba

## 2024-10-09 NOTE — PROGRESS NOTES
Nora Alarcon is a 73 year old year old patient who comes in today for a Blood Pressure check because of ongoing blood pressure monitoring. Stopped taking losartan when she ran out. Has been 2 weeks since she has taken    Vital Signs as repeated by /84 p 86 146/78    Patient is not taking medication as prescribed    Patient is tolerating medications well when she is taking them  Patient is not monitoring Blood Pressure at home.     Current complaints: none    Disposition:  await provider response      Hilario Magaña RN

## 2024-10-09 NOTE — TELEPHONE ENCOUNTER
Agree with triage advice for sooner appointment, but given otherwise stable vitals, ok for appointment on Friday

## 2024-10-09 NOTE — TELEPHONE ENCOUNTER
"Pt comes in today for vaccines and would like bp check. Bp's were elevated so a nurse visit was started.   Pt had been seen for bp check in June but did not follow up and medication ran out. Pt has been off losartan for 2 weeks.     Pt also reports :\"feeling a rattle\" in her right rib area for the last couple weeks. Denies sob or fever. Pt is not feeling ill. Denies cough. Sats 95% and rr 16. Crackles were auscultated in anterior base of right lung. Clear lung sounds in all other lobes.    Pt was offered an appointment tomorrow morning however pt cannot get ride until Friday. Pt was advised of UC however pt would prefer appointment. Appt scheduled for Friday and pt was advised if she develops fever, sob, cough she should be seen sooner.     Routing to provider as FYI and to see if pt ok to wait until Friday for evaluation.       Hilario Magaña RN    "

## 2024-10-09 NOTE — TELEPHONE ENCOUNTER
Pt is coming in on Friday to see Dr. Singh for Rattling in her lungs. She will schedule annual with Dr Hill once she is feeling better.    Clarisse Alonso on 10/9/2024 at 4:33 PM

## 2024-10-11 ENCOUNTER — OFFICE VISIT (OUTPATIENT)
Dept: FAMILY MEDICINE | Facility: CLINIC | Age: 74
End: 2024-10-11
Payer: COMMERCIAL

## 2024-10-11 VITALS
OXYGEN SATURATION: 95 % | WEIGHT: 148.6 LBS | RESPIRATION RATE: 20 BRPM | BODY MASS INDEX: 23.27 KG/M2 | TEMPERATURE: 98.3 F | SYSTOLIC BLOOD PRESSURE: 148 MMHG | HEART RATE: 93 BPM | DIASTOLIC BLOOD PRESSURE: 80 MMHG

## 2024-10-11 DIAGNOSIS — I10 WHITE COAT SYNDROME WITH HYPERTENSION: ICD-10-CM

## 2024-10-11 DIAGNOSIS — Z00.00 NORMAL LUNGS ON EXAMINATION: Primary | ICD-10-CM

## 2024-10-11 PROCEDURE — 99214 OFFICE O/P EST MOD 30 MIN: CPT | Performed by: NURSE PRACTITIONER

## 2024-10-11 ASSESSMENT — PAIN SCALES - GENERAL: PAINLEVEL: MODERATE PAIN (5)

## 2024-10-11 NOTE — PROGRESS NOTES
"  Assessment & Plan     Normal lungs on examination  Was concerned as she thought she heard a \"rattle\" in her right lower lung for the past 2 weeks.  She is not coughing.  She is not having pain in this area.  She generally only feels this when she lies down.  She has not been short of breath, no exertional dyspnea, no orthopnea, no chest pain, no swelling of the extremities.  Lung exam today is normal.  We discussed the possibility of obtaining a chest x-ray, but patient would like to defer this for now.  She will let us know if things are not improving.    White coat syndrome with hypertension  Clinic BP elevated today, has been checking at home, BPs 130/80 consistently when checked at home.        See Patient Instructions    Subjective   Nora is a 73 year old, presenting for the following health issues:  Breathing Problem (Crackling sound in right lung)        10/11/2024     1:14 PM   Additional Questions   Roomed by RUDY Zuñiga CMA     History of Present Illness       Reason for visit:  Rattle in right lung  Symptom onset:  1-2 weeks ago  Symptoms include:  Rattling noise in right lung  Symptom intensity:  Mild  Symptom progression:  Staying the same  Had these symptoms before:  No  What makes it worse:  Nothing  What makes it better:  Nothing   She is taking medications regularly.         Review of Systems  Constitutional, neuro, ENT, endocrine, pulmonary, cardiac, gastrointestinal, genitourinary, musculoskeletal, integument and psychiatric systems are negative, except as otherwise noted.      Objective    BP (!) 148/80   Pulse 93   Temp 98.3  F (36.8  C) (Oral)   Resp 20   Wt 67.4 kg (148 lb 9.6 oz)   SpO2 95%   BMI 23.27 kg/m    Body mass index is 23.27 kg/m .  Physical Exam  Vitals and nursing note reviewed.   Constitutional:       Appearance: Normal appearance.   HENT:      Head: Normocephalic and atraumatic.      Mouth/Throat:      Mouth: Mucous membranes are moist.   Eyes:      Comments: Non-icteric "   Cardiovascular:      Rate and Rhythm: Normal rate and regular rhythm.      Pulses: Normal pulses.      Heart sounds: Normal heart sounds, S1 normal and S2 normal. Heart sounds not distant. No murmur heard.     No friction rub. No gallop.   Pulmonary:      Effort: Pulmonary effort is normal.      Breath sounds: Normal breath sounds.   Abdominal:      General: Abdomen is flat. Bowel sounds are normal.      Palpations: Abdomen is soft.   Musculoskeletal:      Cervical back: Neck supple.      Right lower leg: No edema.      Left lower leg: No edema.   Skin:     General: Skin is warm and dry.      Capillary Refill: Capillary refill takes less than 2 seconds.   Neurological:      General: No focal deficit present.      Mental Status: She is alert and oriented to person, place, and time.   Psychiatric:         Mood and Affect: Mood normal.         Behavior: Behavior normal.         Thought Content: Thought content normal.         Judgment: Judgment normal.                    Signed Electronically by: JESUS Sun CNP

## 2024-10-15 ENCOUNTER — TELEPHONE (OUTPATIENT)
Dept: FAMILY MEDICINE | Facility: CLINIC | Age: 74
End: 2024-10-15
Payer: COMMERCIAL

## 2024-10-15 NOTE — TELEPHONE ENCOUNTER
Patient Quality Outreach    Patient is due for the following:   Hypertension -  Hypertension follow-up visit  Physical Preventive Adult Physical    Next Steps:   Schedule a Annual Wellness Visit    Type of outreach:    Sent letter.    Next Steps:  Reach out within 90 days via Letter.    Max number of attempts reached: Yes. Will try again in 90 days if patient still on fail list.    Questions for provider review:    None           Cait Zuniga MA  Chart routed to .

## 2024-12-30 DIAGNOSIS — E78.5 HYPERLIPIDEMIA LDL GOAL <130: ICD-10-CM

## 2024-12-31 DIAGNOSIS — I10 ESSENTIAL HYPERTENSION WITH GOAL BLOOD PRESSURE LESS THAN 140/90: ICD-10-CM

## 2024-12-31 RX ORDER — SIMVASTATIN 10 MG
10 TABLET ORAL DAILY
Qty: 90 TABLET | Refills: 0 | Status: SHIPPED | OUTPATIENT
Start: 2024-12-31

## 2025-01-02 RX ORDER — LOSARTAN POTASSIUM 25 MG/1
25 TABLET ORAL DAILY
Qty: 90 TABLET | Refills: 0 | Status: SHIPPED | OUTPATIENT
Start: 2025-01-02

## 2025-01-14 ENCOUNTER — OFFICE VISIT (OUTPATIENT)
Dept: FAMILY MEDICINE | Facility: CLINIC | Age: 75
End: 2025-01-14
Payer: COMMERCIAL

## 2025-01-14 VITALS
DIASTOLIC BLOOD PRESSURE: 82 MMHG | OXYGEN SATURATION: 96 % | RESPIRATION RATE: 16 BRPM | BODY MASS INDEX: 25.76 KG/M2 | WEIGHT: 154.6 LBS | HEART RATE: 78 BPM | TEMPERATURE: 98.3 F | HEIGHT: 65 IN | SYSTOLIC BLOOD PRESSURE: 180 MMHG

## 2025-01-14 DIAGNOSIS — E78.5 HYPERLIPIDEMIA LDL GOAL <130: ICD-10-CM

## 2025-01-14 DIAGNOSIS — M81.0 AGE-RELATED OSTEOPOROSIS WITHOUT CURRENT PATHOLOGICAL FRACTURE: ICD-10-CM

## 2025-01-14 DIAGNOSIS — Z00.00 ENCOUNTER FOR MEDICARE ANNUAL WELLNESS EXAM: Primary | ICD-10-CM

## 2025-01-14 DIAGNOSIS — N39.44 NOCTURNAL ENURESIS: ICD-10-CM

## 2025-01-14 DIAGNOSIS — I10 ESSENTIAL HYPERTENSION WITH GOAL BLOOD PRESSURE LESS THAN 140/90: ICD-10-CM

## 2025-01-14 PROCEDURE — 99214 OFFICE O/P EST MOD 30 MIN: CPT | Mod: 25 | Performed by: INTERNAL MEDICINE

## 2025-01-14 PROCEDURE — G0439 PPPS, SUBSEQ VISIT: HCPCS | Performed by: INTERNAL MEDICINE

## 2025-01-14 PROCEDURE — G2211 COMPLEX E/M VISIT ADD ON: HCPCS | Performed by: INTERNAL MEDICINE

## 2025-01-14 RX ORDER — SIMVASTATIN 10 MG
10 TABLET ORAL DAILY
Qty: 90 TABLET | Refills: 3 | Status: SHIPPED | OUTPATIENT
Start: 2025-01-14

## 2025-01-14 RX ORDER — LOSARTAN POTASSIUM 25 MG/1
25 TABLET ORAL DAILY
Qty: 90 TABLET | Refills: 3 | Status: SHIPPED | OUTPATIENT
Start: 2025-01-14

## 2025-01-14 RX ORDER — TOLTERODINE 4 MG/1
4 CAPSULE, EXTENDED RELEASE ORAL DAILY
Qty: 90 CAPSULE | Refills: 3 | Status: SHIPPED | OUTPATIENT
Start: 2025-01-14

## 2025-01-14 SDOH — HEALTH STABILITY: PHYSICAL HEALTH: ON AVERAGE, HOW MANY DAYS PER WEEK DO YOU ENGAGE IN MODERATE TO STRENUOUS EXERCISE (LIKE A BRISK WALK)?: 0 DAYS

## 2025-01-14 SDOH — HEALTH STABILITY: PHYSICAL HEALTH: ON AVERAGE, HOW MANY MINUTES DO YOU ENGAGE IN EXERCISE AT THIS LEVEL?: 0 MIN

## 2025-01-14 ASSESSMENT — SOCIAL DETERMINANTS OF HEALTH (SDOH): HOW OFTEN DO YOU GET TOGETHER WITH FRIENDS OR RELATIVES?: ONCE A WEEK

## 2025-01-14 ASSESSMENT — PAIN SCALES - GENERAL: PAINLEVEL_OUTOF10: NO PAIN (0)

## 2025-01-14 NOTE — PATIENT INSTRUCTIONS
Bladder  Will reorder the Detrol, it may be cheaper this year.   If you run into cost issues, let me know    Hypertension  Hyperlipidemia  Refills sent  Come back in 4 weeks for RN blood pressure check and fasting blood work   Bring home blood pressure log.  Check blood pressure 2-4 x week    Ideal blood pressure is consistently less than 140.  Best way is take 1-2 hours after you have taken your blood pressure medication, and not while drinking coffee, alcohol, using advil, sudafed, etc.  These can raise your blood pressure.   Sit quietly for several min with feet flat on ground.  With arm at heart level, take blood pressure, then again in 1 min.  Average the 2 readings and record this.  You can come back to see RN for blood pressure check.  Omron is a good brand of blood pressure cuff.  It should be an arm cuff, not a wrist cuff    Balance  Consider an exercise program or formal physical therapy.  If you want referral for physical therapy let me know    There are many benefits of Physical Therapy.  Physical therapy is different than exercise you do independently or 'staying busy' at home and/or work  Physical therapy leads to less pain and improved function over time in many ways:  Targeted exercises that lengthen the injured muscle in order to promote healthy tendon healing (called remodeling)  Targeted strengthening and stabilization of surrounding muscle groups - leading to less injury over time  Improves or normalizes range of motion of the affected joint and muscle group    If you are unable to do formal physical therapy, try some home exercises  [email or web address]     Exercise Resources:    For Seniors or Those with Pain/Mobility Issues:  [employer]  [email or web address].Mindmancer/  [employer]itness, [employer] Fitness, [facility]  2. [employer]mart Stretch (airs on PBS)  https://Zealify.Mindmancer/  3. Your [employer] - small group classes (in person or online) that help you  stay active. Free or low cost  [email or web address]  4. Yoga for Seniors - free, online  [email or web address]/[email or web address]  5. Sit and Be Fit [email or web address]  6. Nish Chi  Nish Chi is a great option for balance.  Consider Nish Chi or Qi Gong  Nish Andi Dc: moving for Better Balance  [facility] - videos for balance        Patient Education   Preventive Care Advice   This is general advice given by our system to help you stay healthy. However, your care team may have specific advice just for you. Please talk to your care team about your preventive care needs.  Nutrition  Eat 5 or more servings of fruits and vegetables each day.  Try wheat bread, brown rice and whole grain pasta (instead of white bread, rice, and pasta).  Get enough calcium and vitamin D. Check the label on foods and aim for 100% of the RDA (recommended daily allowance).  Lifestyle  Exercise at least 150 minutes each week  (30 minutes a day, 5 days a week).  Do muscle strengthening activities 2 days a week. These help control your weight and prevent disease.  No smoking.  Wear sunscreen to prevent skin cancer.  Have a dental exam and cleaning every 6 months.  Yearly exams  See your health care team every year to talk about:  Any changes in your health.  Any medicines your care team has prescribed.  Preventive care, family planning, and ways to prevent chronic diseases.  Shots (vaccines)   HPV shots (up to age 26), if you've never had them before.  Hepatitis B shots (up to age 59), if you've never had them before.  COVID-19 shot: Get this shot when it's due.  Flu shot: Get a flu shot every year.  Tetanus shot: Get a tetanus shot every 10 years.  Pneumococcal, hepatitis A, and RSV shots: Ask your care team if you need these based on your risk.  Shingles shot (for age 50 and up)  General health tests  Diabetes screening:  Starting at age 35, Get screened for diabetes at least every 3  years.  If you are younger than age 35, ask your care team if you should be screened for diabetes.  Cholesterol test: At age 39, start having a cholesterol test every 5 years, or more often if advised.  Bone density scan (DEXA): At age 50, ask your care team if you should have this scan for osteoporosis (brittle bones).  Hepatitis C: Get tested at least once in your life.  STIs (sexually transmitted infections)  Before age 24: Ask your care team if you should be screened for STIs.  After age 24: Get screened for STIs if you're at risk. You are at risk for STIs (including HIV) if:  You are sexually active with more than one person.  You don't use condoms every time.  You or a partner was diagnosed with a sexually transmitted infection.  If you are at risk for HIV, ask about PrEP medicine to prevent HIV.  Get tested for HIV at least once in your life, whether you are at risk for HIV or not.  Cancer screening tests  Cervical cancer screening: If you have a cervix, begin getting regular cervical cancer screening tests starting at age 21.  Breast cancer scan (mammogram): If you've ever had breasts, begin having regular mammograms starting at age 40. This is a scan to check for breast cancer.  Colon cancer screening: It is important to start screening for colon cancer at age 45.  Have a colonoscopy test every 10 years (or more often if you're at risk) Or, ask your provider about stool tests like a FIT test every year or Cologuard test every 3 years.  To learn more about your testing options, visit:   .  For help making a decision, visit:   https://bit.ly/up54326.  Prostate cancer screening test: If you have a prostate, ask your care team if a prostate cancer screening test (PSA) at age 55 is right for you.  Lung cancer screening: If you are a current or former smoker ages 50 to 80, ask your care team if ongoing lung cancer screenings are right for you.  For informational purposes only. Not to replace the advice of your  health care provider. Copyright   2023 [facility]. All rights reserved. Clinically reviewed by the [facility]. [employer] 241970 - REV 01/24.  Preventing Falls: Care Instructions  Injuries and health problems such as trouble walking or poor eyesight can increase your risk of falling. So can some medicines. But there are things you can do to help prevent falls. You can exercise to get stronger. You can also arrange your home to make it safer.    Talk to your doctor about the medicines you take. Ask if any of them increase the risk of falls and whether they can be changed or stopped.   Try to exercise regularly. It can help improve your strength and balance. This can help lower your risk of falling.         Practice fall safety and prevention.   Wear low-heeled shoes that fit well and give your feet good support. Talk to your doctor if you have foot problems that make this hard.  Carry a cellphone or wear a medical alert device that you can use to call for help.  Use stepladders instead of chairs to reach high objects. Don't climb if you're at risk for falls. Ask for help, if needed.  Wear the correct eyeglasses, if you need them.        Make your home safer.   Remove rugs, cords, clutter, and furniture from walkways.  Keep your house well lit. Use night-lights in hallways and bathrooms.  Install and use sturdy handrails on stairways.  Wear nonskid footwear, even inside. Don't walk barefoot or in socks without shoes.        Be safe outside.   Use handrails, curb cuts, and ramps whenever possible.  Keep your hands free by using a shoulder bag or backpack.  Try to walk in well-lit areas. Watch out for uneven ground, changes in pavement, and debris.  Be careful in the winter. Walk on the grass or gravel when sidewalks are slippery. Use de-icer on steps and walkways. Add non-slip devices to shoes.    Put grab bars and nonskid mats in your shower or tub and near the toilet. Try to  "use a shower chair or bath bench when bathing.   Get into a tub or shower by putting in your weaker leg first. Get out with your strong side first. Have a phone or medical alert device in the bathroom with you.   Where can you learn more?  Go to https://www.[employer].net/patiented  Enter G117 in the search box to learn more about \"Preventing Falls: Care Instructions.\"  Current as of: July 31, 2024  Content Version: 14.3    2024 [employer].   Care instructions adapted under license by your healthcare professional. If you have questions about a medical condition or this instruction, always ask your healthcare professional. [employer] disclaims any warranty or liability for your use of this information.    Learning About Sleeping Well  What does sleeping well mean?     Sleeping well means getting enough sleep to feel good and stay healthy. How much sleep is enough varies among people.  The number of hours you sleep and how you feel when you wake up are both important. If you do not feel refreshed, you probably need more sleep. Another sign of not getting enough sleep is feeling tired during the day.  Experts recommend that adults get at least 7 or more hours of sleep per day. Children and older adults need more sleep.  Why is getting enough sleep important?  Getting enough quality sleep is a basic part of good health. When your sleep suffers, your physical health, mood, and your thoughts can suffer too. You may find yourself feeling more grumpy or stressed. Not getting enough sleep also can lead to serious problems, including injury, accidents, anxiety, and depression.  What might cause poor sleeping?  Many things can cause sleep problems, including:  Changes to your sleep schedule.  Stress. Stress can be caused by fear about a single event, such as giving a speech. Or you may have ongoing stress, such as worry about work or school.  Depression, anxiety, and other mental or emotional " "conditions.  Changes in your sleep habits or surroundings. This includes changes that happen where you sleep, such as noise, light, or sleeping in a different bed. It also includes changes in your sleep pattern, such as having jet lag or working a late shift.  Health problems, such as pain, breathing problems, and restless legs syndrome.  Lack of regular exercise.  Using alcohol, nicotine, or caffeine before bed.  How can you help yourself?  Here are some tips that may help you sleep more soundly and wake up feeling more refreshed.  Your sleeping area   Use your bedroom only for sleeping and sex. A bit of light reading may help you fall asleep. But if it doesn't, do your reading elsewhere in the house. Try not to use your TV, computer, smartphone, or tablet while you are in bed.  Be sure your bed is big enough to stretch out comfortably, especially if you have a sleep partner.  Keep your bedroom quiet, dark, and cool. Use curtains, blinds, or a sleep mask to block out light. To block out noise, use earplugs, soothing music, or a \"white noise\" machine.  Your evening and bedtime routine   Create a relaxing bedtime routine. You might want to take a warm shower or bath, or listen to soothing music.  Go to bed at the same time every night. And get up at the same time every morning, even if you feel tired.  What to avoid   Limit caffeine (coffee, tea, caffeinated sodas) during the day, and don't have any for at least 6 hours before bedtime.  Avoid drinking alcohol before bedtime. Alcohol can cause you to wake up more often during the night.  Try not to smoke or use tobacco, especially in the evening. Nicotine can keep you awake.  Limit naps during the day, especially close to bedtime.  Avoid lying in bed awake for too long. If you can't fall asleep or if you wake up in the middle of the night and can't get back to sleep within about 20 minutes, get out of bed and go to another room until you feel sleepy.  Avoid taking " "medicine right before bed that may keep you awake or make you feel hyper or energized. Your doctor can tell you if your medicine may do this and if you can take it earlier in the day.  If you can't sleep   Imagine yourself in a peaceful, pleasant scene. Focus on the details and feelings of being in a place that is relaxing.  Get up and do a quiet or boring activity until you feel sleepy.  Avoid drinking any liquids before going to bed to help prevent waking up often to use the bathroom.  Where can you learn more?  Go to https://www.[employer].net/patiented  Enter J942 in the search box to learn more about \"Learning About Sleeping Well.\"  Current as of: July 31, 2024  Content Version: 14.3    2024 [employer].   Care instructions adapted under license by your healthcare professional. If you have questions about a medical condition or this instruction, always ask your healthcare professional. [employer] disclaims any warranty or liability for your use of this information.    Bladder Training: Care Instructions  Your Care Instructions     Bladder training is used to treat urge incontinence and stress incontinence. Urge incontinence means that the need to urinate comes on so fast that you can't get to a toilet in time. Stress incontinence means that you leak urine because of pressure on your bladder. For example, it may happen when you laugh, cough, or lift something heavy.  Bladder training can increase how long you can wait before you have to urinate. It can also help your bladder hold more urine. And it can give you better control over the urge to urinate.  It is important to remember that bladder training takes a few weeks to a few months to make a difference. You may not see results right away, but don't give up.  Follow-up care is a key part of your treatment and safety. Be sure to make and go to all appointments, and call your doctor if you are having problems. It's also a good idea to know " your test results and keep a list of the medicines you take.  How can you care for yourself at home?  Work with your doctor to come up with a bladder training program that is right for you. You may use one or more of the following methods.  Delayed urination  In the beginning, try to keep from urinating for 5 minutes after you first feel the need to go.  While you wait, take deep, slow breaths to relax. Kegel exercises can also help you delay the need to go to the bathroom.  After some practice, when you can easily wait 5 minutes to urinate, try to wait 10 minutes before you urinate.  Slowly increase the waiting period until you are able to control when you have to urinate.  Scheduled urination  Empty your bladder when you first wake up in the morning.  Schedule times throughout the day when you will urinate.  Start by going to the bathroom every hour, even if you don't need to go.  Slowly increase the time between trips to the bathroom.  When you have found a schedule that works well for you, keep doing it.  If you wake up during the night and have to urinate, do it. Apply your schedule to waking hours only.  Kegel exercises  These tighten and strengthen pelvic muscles, which can help you control the flow of urine. (If doing these exercises causes pain, stop doing them and talk with your doctor.) To do Kegel exercises:  Squeeze your muscles as if you were trying not to pass gas. Or squeeze your muscles as if you were stopping the flow of urine. Your belly, legs, and buttocks shouldn't move.  Hold the squeeze for 3 seconds, then relax for 5 to 10 seconds.  Start with 3 seconds, then add 1 second each week until you are able to squeeze for 10 seconds.  Repeat the exercise 10 times a session. Do 3 to 8 sessions a day.  When should you call for help?  Watch closely for changes in your health, and be sure to contact your doctor if:    Your incontinence is getting worse.     You do not get better as expected.   Where can  "you learn more?  Go to https://www.[employer].net/patiented  Enter V684 in the search box to learn more about \"Bladder Training: Care Instructions.\"  Current as of: April 30, 2024  Content Version: 14.3    2024 [employer] [facility].   Care instructions adapted under license by your healthcare professional. If you have questions about a medical condition or this instruction, always ask your healthcare professional. [facility] disclaims any warranty or liability for your use of this information.       "
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
## 2025-02-19 ENCOUNTER — ALLIED HEALTH/NURSE VISIT (OUTPATIENT)
Dept: FAMILY MEDICINE | Facility: CLINIC | Age: 75
End: 2025-02-19
Payer: COMMERCIAL

## 2025-02-19 ENCOUNTER — LAB (OUTPATIENT)
Dept: LAB | Facility: CLINIC | Age: 75
End: 2025-02-19
Payer: COMMERCIAL

## 2025-02-19 ENCOUNTER — TELEPHONE (OUTPATIENT)
Dept: FAMILY MEDICINE | Facility: CLINIC | Age: 75
End: 2025-02-19

## 2025-02-19 VITALS — HEART RATE: 85 BPM | SYSTOLIC BLOOD PRESSURE: 156 MMHG | DIASTOLIC BLOOD PRESSURE: 82 MMHG

## 2025-02-19 DIAGNOSIS — I10 ESSENTIAL HYPERTENSION WITH GOAL BLOOD PRESSURE LESS THAN 140/90: Primary | ICD-10-CM

## 2025-02-19 DIAGNOSIS — E78.5 HYPERLIPIDEMIA LDL GOAL <130: ICD-10-CM

## 2025-02-19 DIAGNOSIS — I10 ESSENTIAL HYPERTENSION WITH GOAL BLOOD PRESSURE LESS THAN 140/90: ICD-10-CM

## 2025-02-19 LAB
ANION GAP SERPL CALCULATED.3IONS-SCNC: 16 MMOL/L (ref 7–15)
BUN SERPL-MCNC: 21.8 MG/DL (ref 8–23)
CALCIUM SERPL-MCNC: 9.8 MG/DL (ref 8.8–10.4)
CHLORIDE SERPL-SCNC: 100 MMOL/L (ref 98–107)
CHOLEST SERPL-MCNC: 208 MG/DL
CREAT SERPL-MCNC: 0.98 MG/DL (ref 0.51–0.95)
EGFRCR SERPLBLD CKD-EPI 2021: 60 ML/MIN/1.73M2
FASTING STATUS PATIENT QL REPORTED: YES
FASTING STATUS PATIENT QL REPORTED: YES
GLUCOSE SERPL-MCNC: 100 MG/DL (ref 70–99)
HCO3 SERPL-SCNC: 24 MMOL/L (ref 22–29)
HDLC SERPL-MCNC: 90 MG/DL
LDLC SERPL CALC-MCNC: 94 MG/DL
NONHDLC SERPL-MCNC: 118 MG/DL
POTASSIUM SERPL-SCNC: 4.6 MMOL/L (ref 3.4–5.3)
SODIUM SERPL-SCNC: 140 MMOL/L (ref 135–145)
TRIGL SERPL-MCNC: 121 MG/DL

## 2025-02-19 PROCEDURE — 80061 LIPID PANEL: CPT

## 2025-02-19 PROCEDURE — 80048 BASIC METABOLIC PNL TOTAL CA: CPT

## 2025-02-19 PROCEDURE — 99207 PR NO CHARGE NURSE ONLY: CPT

## 2025-02-19 PROCEDURE — 36415 COLL VENOUS BLD VENIPUNCTURE: CPT

## 2025-02-19 NOTE — TELEPHONE ENCOUNTER
Called and spoke to patient.  Read her note from Dr Hill.  She is in good understanding.    Meaghan Null on 2/19/2025 at 3:20 PM

## 2025-02-19 NOTE — TELEPHONE ENCOUNTER
Nora Alarcon is a 74 year old year old patient who comes in today for a Blood Pressure check because of ongoing blood pressure monitoring.     Vital Signs as repeated by /84 p 85, 156/82     Patient is taking medication as prescribed    Patient is tolerating medications well.    Patient is monitoring Blood Pressure at home.  Average   readings if yes are average 130/85    Current complaints: none     Disposition:  patient to continue with the same medication and await provider response.         Hilario Magaña RN

## 2025-02-19 NOTE — TELEPHONE ENCOUNTER
Because blood pressure is better controlled at home, no changes to medications.  This is a typical pattern for her.
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
## 2025-07-02 ENCOUNTER — TELEPHONE (OUTPATIENT)
Dept: FAMILY MEDICINE | Facility: CLINIC | Age: 75
End: 2025-07-02
Payer: COMMERCIAL

## 2025-07-02 NOTE — TELEPHONE ENCOUNTER
Patient Quality Outreach    Patient is due for the following:   Physical Annual Wellness Visit    Action(s) Taken:   Patient has upcoming appointment, these items will be addressed at that time.    Type of outreach:    Chart review performed, no outreach needed.    Questions for provider review:    None         Cait Zuniga MA  Chart routed to None.

## 2025-07-08 ENCOUNTER — TELEPHONE (OUTPATIENT)
Dept: FAMILY MEDICINE | Facility: CLINIC | Age: 75
End: 2025-07-08

## 2025-07-08 ENCOUNTER — OFFICE VISIT (OUTPATIENT)
Dept: FAMILY MEDICINE | Facility: CLINIC | Age: 75
End: 2025-07-08
Payer: COMMERCIAL

## 2025-07-08 VITALS
HEIGHT: 64 IN | SYSTOLIC BLOOD PRESSURE: 158 MMHG | OXYGEN SATURATION: 95 % | BODY MASS INDEX: 25.95 KG/M2 | TEMPERATURE: 98.6 F | DIASTOLIC BLOOD PRESSURE: 80 MMHG | RESPIRATION RATE: 16 BRPM | WEIGHT: 152 LBS | HEART RATE: 80 BPM

## 2025-07-08 DIAGNOSIS — L03.116 CELLULITIS OF LEFT LOWER EXTREMITY: Primary | ICD-10-CM

## 2025-07-08 DIAGNOSIS — I10 ESSENTIAL HYPERTENSION WITH GOAL BLOOD PRESSURE LESS THAN 140/90: ICD-10-CM

## 2025-07-08 DIAGNOSIS — F31.0 BIPOLAR AFFECTIVE DISORDER, CURRENT EPISODE HYPOMANIC (H): ICD-10-CM

## 2025-07-08 PROCEDURE — G2211 COMPLEX E/M VISIT ADD ON: HCPCS | Performed by: INTERNAL MEDICINE

## 2025-07-08 PROCEDURE — 3077F SYST BP >= 140 MM HG: CPT | Performed by: INTERNAL MEDICINE

## 2025-07-08 PROCEDURE — 1125F AMNT PAIN NOTED PAIN PRSNT: CPT | Performed by: INTERNAL MEDICINE

## 2025-07-08 PROCEDURE — 3079F DIAST BP 80-89 MM HG: CPT | Performed by: INTERNAL MEDICINE

## 2025-07-08 PROCEDURE — 99214 OFFICE O/P EST MOD 30 MIN: CPT | Performed by: INTERNAL MEDICINE

## 2025-07-08 RX ORDER — LOSARTAN POTASSIUM 50 MG/1
50 TABLET ORAL DAILY
Qty: 90 TABLET | Refills: 3 | Status: SHIPPED | OUTPATIENT
Start: 2025-07-08

## 2025-07-08 RX ORDER — DOXYCYCLINE 100 MG/1
100 CAPSULE ORAL 2 TIMES DAILY
Qty: 14 CAPSULE | Refills: 0 | Status: SHIPPED | OUTPATIENT
Start: 2025-07-08 | End: 2025-07-15

## 2025-07-08 ASSESSMENT — PAIN SCALES - GENERAL: PAINLEVEL_OUTOF10: SEVERE PAIN (9)

## 2025-07-08 NOTE — PROGRESS NOTES
"  Assessment & Plan   Problem List Items Addressed This Visit       Bipolar affective disorder, current episode hypomanic (H) - Known issue that I take into account for their medical decisions, no current exacerbations or new concerns      Essential hypertension with goal blood pressure less than 140/90    Relevant Medications    losartan (COZAAR) 50 MG tablet     Other Visit Diagnoses         Cellulitis of left lower extremity    -  Primary    Relevant Medications    doxycycline hyclate (VIBRAMYCIN) 100 MG capsule             BMI  Estimated body mass index is 25.7 kg/m  as calculated from the following:    Height as of this encounter: 1.638 m (5' 4.49\").    Weight as of this encounter: 68.9 kg (152 lb).       Hypertension   Start antibiotic  Be seen right away if symptoms worsen    Hypertension  Increase losartan from 25 to 50 mg once daily  We will call you to follow-up on home blood pressure in 2-4 weeks      Ideal blood pressure is consistently less than 140/80.  Best way is take 1-2 hours after you have taken your blood pressure medication, and not while drinking coffee, alcohol, using advil, sudafed, etc.  These can raise your blood pressure.   Sit quietly for several min with feet flat on ground.  With arm at heart level, take blood pressure, then again in 1 min.  Average the 2 readings and record this.  You can come back to see RN for blood pressure check.  Omron is a good brand of blood pressure cuff.  It should be an arm cuff, not a wrist cuff      Subjective   Nora is a 74 year old, presenting for the following health issues:  Nail Problem (Left big toe, have had this 2 weeks, have tired kerasal  this did not work , Vicks vapor rub this did not work , 9/10 for the pain )        7/8/2025    12:40 PM   Additional Questions   Roomed by magdy   Accompanied by self         7/8/2025    12:40 PM   Patient Reported Additional Medications   Patient reports taking the following new medications na     HPI  " "    Chief Complaint   Patient presents with    Nail Problem     Left big toe, have had this 2 weeks, have tired kerasal  this did not work , Vicks vapor rub this did not work , 9/10 for the pain        Toe    Hypertension  --blood pressure is high at home. 160/90, sometimes 130/80      Current Outpatient Medications   Medication Sig Dispense Refill    Acetaminophen (TYLENOL PO) Take 1,000 mg by mouth 2 times daily as needed for mild pain or fever       denosumab (PROLIA) 60 MG/ML SOSY injection Inject 60 mg Subcutaneous every 6 months Managed by [facility]      Divalproex Sodium (DEPAKOTE PO) Take 250 mg by mouth 2 times daily       loperamide (IMODIUM A-D) 2 MG capsule Take 1 capsule (2 mg) by mouth 2 times daily as needed for diarrhea 15 capsule 0    losartan (COZAAR) 25 MG tablet Take 1 tablet (25 mg) by mouth daily. 90 tablet 3    MIRTAZAPINE PO Take 45 mg by mouth At Bedtime      PARoxetine (PAXIL) 10 MG tablet TAKE ONE (1) TABLET BY MOUTH EVERY MORNING      simvastatin (ZOCOR) 10 MG tablet Take 1 tablet (10 mg) by mouth daily. 90 tablet 3    tolterodine ER (DETROL LA) 4 MG 24 hr capsule Take 1 capsule (4 mg) by mouth daily. 90 capsule 3           Review of Systems  Constitutional, HEENT, cardiovascular, pulmonary, gi and gu systems are negative, except as otherwise noted.      Objective    BP (!) 158/80 (BP Location: Left arm, Patient Position: Sitting, Cuff Size: Adult Regular)   Pulse 80   Temp 98.6  F (37  C) (Tympanic)   Resp 16   Ht 1.638 m (5' 4.49\")   Wt 68.9 kg (152 lb)   SpO2 95%   BMI 25.70 kg/m    Body mass index is 25.7 kg/m .  Physical Exam   GENERAL: alert and no distress  CV: normal left pedal pulse without pedal edema  SKIN: erythema diffusely of left great toe, missing great toenail.  Erythema and tenderness concentrated on distal end.  Mild spreading erythema up to ankle.  No pain at 1st MTP                  Signed Electronically by: Brandie Hill, DO    "

## 2025-07-08 NOTE — PATIENT INSTRUCTIONS
Hypertension   Start antibiotic  Be seen right away if symptoms worsen    Hypertension  Increase losartan from 25 to 50 mg once daily  We will call you to follow-up on home blood pressure in 2-4 weeks      Ideal blood pressure is consistently less than 140/80.  Best way is take 1-2 hours after you have taken your blood pressure medication, and not while drinking coffee, alcohol, using advil, sudafed, etc.  These can raise your blood pressure.   Sit quietly for several min with feet flat on ground.  With arm at heart level, take blood pressure, then again in 1 min.  Average the 2 readings and record this.  You can come back to see RN for blood pressure check.  Omron is a good brand of blood pressure cuff.  It should be an arm cuff, not a wrist cuff

## (undated) RX ORDER — LIDOCAINE HYDROCHLORIDE 10 MG/ML
INJECTION, SOLUTION INFILTRATION; PERINEURAL
Status: DISPENSED
Start: 2020-10-09

## (undated) RX ORDER — PROPOFOL 10 MG/ML
INJECTION, EMULSION INTRAVENOUS
Status: DISPENSED
Start: 2020-10-09

## (undated) RX ORDER — GLYCOPYRROLATE 0.2 MG/ML
INJECTION, SOLUTION INTRAMUSCULAR; INTRAVENOUS
Status: DISPENSED
Start: 2020-10-09